# Patient Record
Sex: FEMALE | Race: BLACK OR AFRICAN AMERICAN | Employment: FULL TIME | URBAN - METROPOLITAN AREA
[De-identification: names, ages, dates, MRNs, and addresses within clinical notes are randomized per-mention and may not be internally consistent; named-entity substitution may affect disease eponyms.]

---

## 2017-02-24 ENCOUNTER — OFFICE VISIT (OUTPATIENT)
Dept: FAMILY MEDICINE CLINIC | Age: 32
End: 2017-02-24

## 2017-02-24 VITALS
RESPIRATION RATE: 18 BRPM | WEIGHT: 293 LBS | BODY MASS INDEX: 43.4 KG/M2 | OXYGEN SATURATION: 98 % | HEART RATE: 98 BPM | DIASTOLIC BLOOD PRESSURE: 94 MMHG | HEIGHT: 69 IN | TEMPERATURE: 97.7 F | SYSTOLIC BLOOD PRESSURE: 143 MMHG

## 2017-02-24 DIAGNOSIS — J45.909 UNCOMPLICATED ASTHMA, UNSPECIFIED ASTHMA SEVERITY: ICD-10-CM

## 2017-02-24 DIAGNOSIS — E11.9 DIABETES MELLITUS WITHOUT COMPLICATION (HCC): ICD-10-CM

## 2017-02-24 DIAGNOSIS — Z00.00 PHYSICAL EXAM: Primary | ICD-10-CM

## 2017-02-24 DIAGNOSIS — F31.70 BIPOLAR AFFECTIVE DISORDER IN REMISSION (HCC): ICD-10-CM

## 2017-02-24 DIAGNOSIS — E66.01 MORBID OBESITY, UNSPECIFIED OBESITY TYPE (HCC): ICD-10-CM

## 2017-02-24 RX ORDER — OXCARBAZEPINE 600 MG/1
1200 TABLET, FILM COATED ORAL
COMMUNITY
End: 2019-05-22 | Stop reason: ALTCHOICE

## 2017-02-24 RX ORDER — AZITHROMYCIN 250 MG/1
250 TABLET, FILM COATED ORAL DAILY
COMMUNITY
End: 2019-05-22 | Stop reason: ALTCHOICE

## 2017-02-24 RX ORDER — ALBUTEROL SULFATE 90 UG/1
1-2 AEROSOL, METERED RESPIRATORY (INHALATION)
Qty: 1 INHALER | Refills: 2 | Status: SHIPPED | OUTPATIENT
Start: 2017-02-24 | End: 2019-06-21 | Stop reason: ALTCHOICE

## 2017-02-24 RX ORDER — BENZONATATE 100 MG/1
100 CAPSULE ORAL
COMMUNITY
End: 2017-02-24 | Stop reason: ALTCHOICE

## 2017-02-24 RX ORDER — BUPROPION HYDROCHLORIDE 75 MG/1
TABLET ORAL 2 TIMES DAILY
COMMUNITY
End: 2019-05-22 | Stop reason: ALTCHOICE

## 2017-02-24 NOTE — MR AVS SNAPSHOT
Visit Information Date & Time Provider Department Dept. Phone Encounter #  
 2/24/2017 10:40 AM Keenan Cleary MD Landon Purvis [id number] Follow-up Instructions Return in about 4 months (around 6/24/2017) for Follow-up, DM. Upcoming Health Maintenance Date Due DTaP/Tdap/Td series (1 - Tdap) 5/27/2007 PAP AKA CERVICAL CYTOLOGY 2/28/2016 INFLUENZA AGE 9 TO ADULT 8/1/2016 Allergies as of 2/24/2017  Review Complete On: 2/24/2017 By: Keenan Cleary MD  
  
 Severity Noted Reaction Type Reactions Bactrim [Sulfamethoprim Ds] High 09/25/2013   Systemic Hives Tape [Adhesive]  09/11/2013    Rash Current Immunizations  Reviewed on 9/25/2013 Name Date Influenza Vaccine 9/25/2013 Pneumococcal Polysaccharide (PPSV-23) 1/2/2014  1:28 PM  
  
 Not reviewed this visit You Were Diagnosed With   
  
 Codes Comments Physical exam    -  Primary ICD-10-CM: Z00.00 ICD-9-CM: V70.9 Diabetes mellitus without complication ([facility]     XTJ-82-BH: E11.9 ICD-9-CM: 250.00 Morbid obesity, unspecified obesity type ([facility]     ICD-10-CM: E66.01 
ICD-9-CM: 278.01 Uncomplicated asthma, unspecified asthma severity     ICD-10-CM: J45.909 ICD-9-CM: 493.90 Bipolar affective disorder in remission [facility])     ICD-10-CM: F31.70 ICD-9-CM: 296.80 Vitals BP  
  
  
  
  
  
 (!) 143/94 (BP 1 Location: Left arm, BP Patient Position: Sitting) BMI and BSA Data Body Mass Index Body Surface Area 54.58 kg/m 2 2.86 m 2 Preferred Pharmacy Pharmacy Name Phone [address] [phone] Your Updated Medication List  
  
   
This list is accurate as of: 2/24/17 11:46 AM.  Always use your most recent med list.  
  
  
  
  
 albuterol 90 mcg/actuation inhaler Commonly known as:  PROVENTIL HFA, VENTOLIN HFA, PROAIR HFA Take 1-2 Puffs by inhalation every four (4) hours as needed for Wheezing. azithromycin 250 mg tablet Commonly known as:  Deloris [place] Take 250 mg by mouth daily. buPROPion 75 mg tablet Commonly known as:  [facility] Take  by mouth two (2) times a day. guaiFENesin-dextromethorphan -30 mg per tablet Commonly known as:  [employer] DM Take 1 Tab by mouth two (2) times a day. OXcarbaxepine 600 mg tablet Commonly known as:  TRILEPTAL Take 1,200 mg by mouth. Prescriptions Sent to Pharmacy Refills  
 albuterol (PROVENTIL HFA, VENTOLIN HFA, PROAIR HFA) 90 mcg/actuation inhaler 2 Sig: Take 1-2 Puffs by inhalation every four (4) hours as needed for Wheezing. Class: Normal  
 Pharmacy: [facility] [address] [place] [address] [place] Ph #: [phone] Route: Inhalation  
 guaiFENesin-dextromethorphan SR (MUCINEX DM) 600-30 mg per tablet 0 Sig: Take 1 Tab by mouth two (2) times a day. Class: Normal  
 Pharmacy: [facility] [address] [place] [address] [place] Ph #: [phone] Route: Oral  
  
We Performed the Following CBC WITH AUTOMATED DIFF [56519 CPT(R)] HEMOGLOBIN A1C WITH EAG [79202 CPT(R)] LIPID PANEL [67837 CPT(R)] METABOLIC PANEL, COMPREHENSIVE [81183 CPT(R)] MICROALBUMIN, UR, RAND W/ MICROALBUMIN/CREA RATIO [id number] CPT(R)] T4, FREE [id number] CPT(R)] TSH 3RD GENERATION [02461 CPT(R)] URINALYSIS W/ RFLX MICROSCOPIC [47703 CPT(R)] VITAMIN D, 25 HYDROXY [id number] CPT(R)] Follow-up Instructions Return in about 4 months (around 6/24/2017) for Follow-up, DM. Patient Instructions A Healthy Lifestyle: Care Instructions Your Care Instructions A healthy lifestyle can help you feel good, stay at a healthy weight, and have plenty of energy for both work and play.  A healthy lifestyle is something you can share with your whole family. A healthy lifestyle also can lower your risk for serious health problems, such as high blood pressure, heart disease, and diabetes. You can follow a few steps listed below to improve your health and the health of your family. Follow-up care is a key part of your treatment and safety. Be sure to make and go to all appointments, and call your doctor if you are having problems. Its also a good idea to know your test results and keep a list of the medicines you take. How can you care for yourself at home? · Do not eat too much sugar, fat, or fast foods. You can still have dessert and treats now and then. The goal is moderation. · Start small to improve your eating habits. Pay attention to portion sizes, drink less juice and soda pop, and eat more fruits and vegetables. ¨ Eat a healthy amount of food. A 3-ounce serving of meat, for example, is about the size of a deck of cards. Fill the rest of your plate with vegetables and whole grains. ¨ Limit the amount of soda and sports drinks you have every day. Drink more water when you are thirsty. ¨ Eat at least 5 servings of fruits and vegetables every day. It may seem like a lot, but it is not hard to reach this goal. A serving or helping is 1 piece of fruit, 1 cup of vegetables, or 2 cups of leafy, raw vegetables. Have an apple or some carrot sticks as an afternoon snack instead of a candy bar. Try to have fruits and/or vegetables at every meal. 
· Make exercise part of your daily routine. You may want to start with simple activities, such as walking, bicycling, or slow swimming. Try to be active 30 to 60 minutes every day. You do not need to do all 30 to 60 minutes all at once. For example, you can exercise 3 times a day for 10 or 20 minutes. Moderate exercise is safe for most people, but it is always a good idea to talk to your doctor before starting an exercise program. 
· Keep moving.  Vipul Haus the lawn, work in the garden, or clean your house. Take the stairs instead of the elevator at work. · If you smoke, quit. People who smoke have an increased risk for heart attack, stroke, cancer, and other lung illnesses. Quitting is hard, but there are ways to boost your chance of quitting tobacco for good. ¨ Use nicotine gum, patches, or lozenges. ¨ Ask your doctor about stop-smoking programs and medicines. ¨ Keep trying. In addition to reducing your risk of diseases in the future, you will notice some benefits soon after you stop using tobacco. If you have shortness of breath or asthma symptoms, they will likely get better within a few weeks after you quit. · Limit how much alcohol you drink. Moderate amounts of alcohol (up to 2 drinks a day for men, 1 drink a day for women) are okay. But drinking too much can lead to liver problems, high blood pressure, and other health problems. Family health If you have a family, there are many things you can do together to improve your health. · Eat meals together as a family as often as possible. · Eat healthy foods. This includes fruits, vegetables, lean meats and dairy, and whole grains. · Include your family in your fitness plan. Most people think of activities such as jogging or tennis as the way to fitness, but there are many ways you and your family can be more active. Anything that makes you breathe hard and gets your heart pumping is exercise. Here are some tips: 
¨ Walk to do errands or to take your child to school or the bus. ¨ Go for a family bike ride after dinner instead of watching TV. Where can you learn more? Go to http://seth-vidal.info/. Enter P380 in the search box to learn more about \"A Healthy Lifestyle: Care Instructions. \" Current as of: July 26, 2016 Content Version: 11.1 © 4181-4709 [employer]. Care instructions adapted under license by [employer] (which disclaims liability or warranty for this information).  If you have questions about a medical condition or this instruction, always ask your healthcare professional. [address] any warranty or liability for your use of this information. Introducing [facility]! Rick Dempsey introduces Zhihu patient portal. Now you can access parts of your medical record, email your doctor's office, and request medication refills online. 1. In your internet browser, go to [email or web address] 2. Click on the First Time User? Click Here link in the Sign In box. You will see the New Member Sign Up page. 3. Enter your Zhihu Access Code exactly as it appears below. You will not need to use this code after youve completed the sign-up process. If you do not sign up before the expiration date, you must request a new code. · Zhihu Access Code: [email or web address] Expires: 5/25/2017 10:05 AM 
 
4. Enter the last four digits of your Social Security Number (xxxx) and Date of Birth (mm/dd/yyyy) as indicated and click Submit. You will be taken to the next sign-up page. 5. Create a Zhihu ID. This will be your Zhihu login ID and cannot be changed, so think of one that is secure and easy to remember. 6. Create a Zhihu password. You can change your password at any time. 7. Enter your Password Reset Question and Answer. This can be used at a later time if you forget your password. 8. Enter your e-mail address. You will receive e-mail notification when new information is available in [address]. 9. Click Sign Up. You can now view and download portions of your medical record. 10. Click the Download Summary menu link to download a portable copy of your medical information. If you have questions, please visit the Frequently Asked Questions section of the Zhihu website. Remember, Zhihu is NOT to be used for urgent needs. For medical emergencies, dial 911. Now available from your iPhone and Android!  
 
  
  
 Please provide this summary of care documentation to your next provider. Your primary care clinician is listed as Chase Daniel. If you have any questions after today's visit, please call [phone].

## 2017-02-24 NOTE — PATIENT INSTRUCTIONS

## 2017-02-24 NOTE — LETTER
3/20/2017 4:53 PM 
 
Ms. Christine López 
[address] [facility] [address] [id number] Dear Christine López: 
 
Please find your most recent results below. Resulted Orders CBC WITH AUTOMATED DIFF Result Value Ref Range WBC 6.9 3.4 - 10.8 x10E3/uL  
 RBC 5.20 3.77 - 5.28 x10E6/uL HGB 11.5 11.1 - 15.9 g/dL HCT 36.8 34.0 - 46.6 % MCV 71 (L) 79 - 97 fL  
 MCH 22.1 (L) 26.6 - 33.0 pg  
 MCHC 31.3 (L) 31.5 - 35.7 g/dL  
 RDW 15.9 (H) 12.3 - 15.4 % PLATELET 024 324 - 547 x10E3/uL NEUTROPHILS 54 % Lymphocytes 39 % MONOCYTES 4 % EOSINOPHILS 3 % BASOPHILS 0 %  
 ABS. NEUTROPHILS 3.7 1.4 - 7.0 x10E3/uL Abs Lymphocytes 2.7 0.7 - 3.1 x10E3/uL  
 ABS. MONOCYTES 0.3 0.1 - 0.9 x10E3/uL  
 ABS. EOSINOPHILS 0.2 0.0 - 0.4 x10E3/uL  
 ABS. BASOPHILS 0.0 0.0 - 0.2 x10E3/uL IMMATURE GRANULOCYTES 0 %  
 ABS. IMM. GRANS. 0.0 0.0 - 0.1 x10E3/uL Narrative Performed at:  [address]  [id number] : Nikki Stroud MD, Phone:  [phone] METABOLIC PANEL, COMPREHENSIVE Result Value Ref Range Glucose 93 65 - 99 mg/dL BUN 9 6 - 20 mg/dL Creatinine 0.69 0.57 - 1.00 mg/dL GFR est non- >59 mL/min/1.73 GFR est  >59 mL/min/1.73  
 BUN/Creatinine ratio 13 8 - 20 Sodium 135 134 - 144 mmol/L Potassium 4.2 3.5 - 5.2 mmol/L Chloride 97 96 - 106 mmol/L  
 CO2 26 18 - 29 mmol/L Calcium 9.1 8.7 - 10.2 mg/dL Protein, total 7.5 6.0 - 8.5 g/dL Albumin 4.2 3.5 - 5.5 g/dL GLOBULIN, TOTAL 3.3 1.5 - 4.5 g/dL A-G Ratio 1.3 1.1 - 2.5 Comment: **Effective March 13, 2017 the reference interval** 
  for A/G Ratio will be changing to: Age                Male          Female           0 -  7 days       1.1 - 2.3       1.1 - 2.3 
          8 - 30 days       1.2 - 2.8       1.2 - 2.8 
          1 -  6 months     1.3 - 3.6       1.3 - 3.6 
   7 months -  5 years      1.5 - 2.6       1.5 - 2.6 
             > 5 years      1.2 - 2.2       1.2 - 2.2 Bilirubin, total <0.2 0.0 - 1.2 mg/dL Alk. phosphatase 62 39 - 117 IU/L  
 AST (SGOT) 14 0 - 40 IU/L  
 ALT (SGPT) 13 0 - 32 IU/L Narrative Performed at:  [address]  [id number] : Obdulia Templeton MD, Phone:  [phone] LIPID PANEL Result Value Ref Range Cholesterol, total 161 100 - 199 mg/dL Triglyceride 64 0 - 149 mg/dL HDL Cholesterol 40 >39 mg/dL VLDL, calculated 13 5 - 40 mg/dL LDL, calculated 108 (H) 0 - 99 mg/dL Narrative Performed at:  [address]  [id number] : Obdulia Templeton MD, Phone:  [phone] HEMOGLOBIN A1C WITH EAG Result Value Ref Range Hemoglobin A1c 6.1 (H) 4.8 - 5.6 % Comment:  
            Pre-diabetes: 5.7 - 6.4 Diabetes: >6.4 Glycemic control for adults with diabetes: <7.0 Estimated average glucose 128 mg/dL Narrative Performed at:  [address]  [id number] : Obdulia Templeton MD, Phone:  [phone] TSH 3RD GENERATION Result Value Ref Range TSH 1.070 0.450 - 4.500 uIU/mL Narrative Performed at:  [address]  [id number] : Obdulia Templeton MD, Phone:  [phone] T4, FREE Result Value Ref Range T4, Free 1.32 0.82 - 1.77 ng/dL Narrative Performed at:  [address]  [id number] : Obdulia Templeton MD, Phone:  [phone] VITAMIN D, 25 HYDROXY Result Value Ref Range VITAMIN D, 25-HYDROXY 15.5 (L) 30.0 - 100.0 ng/mL Comment:  
   Vitamin D deficiency has been defined by the [facility] practice guideline as a 
level of serum 25-OH vitamin D less than 20 ng/mL (1,2).  
The Endocrine Society went on to further define vitamin D 
insufficiency as a level between 21 and 29 ng/mL (2). 1. IOM ([place] of Medicine). 2010. Dietary reference 
   intakes for calcium and D. [facility] 
   Astley Clarke. 2. Dana MF, Cesario PARKS, Saad ROLDAN, et al. 
   Evaluation, treatment, and prevention of vitamin D 
   deficiency: an Endocrine Society clinical practice 
   guideline. JCEM. 2011 Jul; 96(7):1911-30. Narrative Performed at:  [address]  [id number] : Santos Simeon MD, Phone:  [phone] URINALYSIS W/ RFLX MICROSCOPIC Result Value Ref Range Specific Gravity 1.026 1.005 - 1.030  
 pH (UA) 6.5 5.0 - 7.5 Color Yellow Yellow Appearance Cloudy (A) Clear Leukocyte Esterase 1+ (A) Negative Protein 1+ (A) Negative/Trace Glucose Negative Negative Ketone Negative Negative Blood 1+ (A) Negative Bilirubin Negative Negative Urobilinogen 0.2 0.2 - 1.0 mg/dL Nitrites Negative Negative Microscopic Examination See additional order Comment:  
   Microscopic was indicated and was performed. Narrative Performed at:  [address]  [id number] : Santos Simeon MD, Phone:  [phone] MICROALBUMIN, UR, RAND W/ MICROALBUMIN/CREA RATIO Result Value Ref Range Creatinine, urine 199.6 Not Estab. mg/dL Microalbumin, urine 100.8 Not Estab. ug/mL Microalb/Creat ratio (ug/mg creat.) 50.5 (H) 0.0 - 30.0 mg/g creat Narrative Performed at:  [address]  [id number] : Santos Simeon MD, Phone:  [phone] MICROSCOPIC EXAMINATION Result Value Ref Range WBC 6-10 (A) 0 - 5 /hpf  
 RBC 0-2 0 - 2 /hpf Epithelial cells 0-10 0 - 10 /hpf Casts None seen None seen /lpf Mucus Present Not Estab. Bacteria Few None seen/Few Narrative  Performed at:  [place] [address] [address]  [id number] : Stefano Crowder MD, Phone:  [phone] CVD REPORT Result Value Ref Range INTERPRETATION Note Comment:  
   Supplement report is available. Narrative Performed at:  [address] [place] [address]  [id number] : Chi Cosme PhD, Phone:  [phone] DIABETES PATIENT EDUCATION Result Value Ref Range PDF Image Not applicable Narrative Performed at:  [address] [place] [address]  [id number] : Chi Cosme PhD, Phone:  [phone] RECOMMENDATIONS: 
 
Complete Blood Count: Hemoglobin improved to 11.5 Comprehensive Metabolic Panel:  Normal  
Hemoglobin A1C: 6.1 Diabetes control good . Microalb/Creat ratio: Elevated Thyroid Tests: Normal  
Cholesterol: marginally Elevated LDL ( Bad cholesterol ) Urine analysis: Increased WBCs Vitamin D Level: Low Advise: Stick to strict diabetic diet, exercise regularly and if on medications be compliant with your medications. *Take Over the Counter Vit D3 2000 units daily* Schedule follow-up with new PCP Please call me if you have any questions: [phone] Sincerely, Keenan Cleary MD

## 2017-02-24 NOTE — PROGRESS NOTES
HISTORY OF PRESENT ILLNESS  Maribel Kent is a 28 y.o. female. Complete Physical   The history is provided by the patient (Here for physical . Recovering from Bronchitis . She is on last pill of Zithromax. She is known to have diabetes ,morbid obesity, asthma and bipolar disorder  . ). Review of Systems   Constitutional: Negative. HENT: Negative. Eyes: Negative. Cardiovascular: Negative. Gastrointestinal: Negative. Genitourinary: Negative. Musculoskeletal: Negative. Skin: Negative. Neurological: Negative. Endo/Heme/Allergies: Negative. Psychiatric/Behavioral: Negative. Physical Exam  General:     Head: Alert, cooperative, no distress, appears stated age. Morbid obesity. Normocephalic and atraumatic   Eyes:  Conjunctivae/corneas clear. PERRL, EOMs intact. Ears:  Normal TMs and external ear canals both ears. Nose: Nares normal. Septum midline. Mucosa normal. No drainage or sinus tenderness. Mouth:  Throat: Lips, mucosa, and tongue normal. Teeth and gums normal.  No lesions or exudates. Neck: Supple, symmetrical, trachea midline, no adenopathy, thyroid: no enlargement/tenderness/nodules. Back:   Symmetric, no curvature. ROM normal. No CVA tenderness. Lungs:   Clear to auscultation bilaterally. Heart:  Regular rate and rhythm, S1, S2 normal, no murmur, click, rub or gallop. Abdomen:   Soft, non-tender. Bowel sounds normal. No masses,  No organomegaly. Extremities: Extremities normal, atraumatic, no cyanosis or edema. Pulses: 2+ and symmetric all extremities. Skin: Skin color, texture, turgor normal. No rashes or lesions   Lymph nodes: Cervical & supraclavicular nodes normal.   Neurologic: CNII-XII intact. Normal strength, sensation and reflexes throughout. Psych:                      Normal mood and affect     ASSESSMENT and PLAN  Encounter Diagnoses   Name Primary?     Physical exam Yes    Diabetes mellitus without complication ([address]     Morbid obesity, unspecified obesity type ([facility]     Uncomplicated asthma, unspecified asthma severity     Bipolar affective disorder in remission ([facility]      Orders Placed This Encounter    CBC WITH AUTOMATED DIFF    METABOLIC PANEL, COMPREHENSIVE    LIPID PANEL    HEMOGLOBIN A1C WITH EAG    TSH 3RD GENERATION    T4, FREE    VITAMIN D, 25 HYDROXY    URINALYSIS W/ RFLX MICROSCOPIC    MICROALBUMIN, UR, RAND W/ MICROALBUMIN/CREA RATIO    DISCONTD: benzonatate (TESSALON) 100 mg capsule    azithromycin (ZITHROMAX) 250 mg tablet    OXcarbaxepine (TRILEPTAL) 600 mg tablet    buPROPion (WELLBUTRIN) 75 mg tablet    albuterol (PROVENTIL HFA, VENTOLIN HFA, PROAIR HFA) 90 mcg/actuation inhaler    guaiFENesin-dextromethorphan SR (MUCINEX DM) 600-30 mg per tablet   Follow-up Disposition:  Return in about 4 months (around 6/24/2017) for Follow-up, DM. Reviewed and discussed previous lab results. Results of labs requested today will be notified when available. She was advised to continue with current medications. Discussed lifestyle issues and health guidance given. She was given an after visit summary which includes diagnoses, vital signs, current medications, &  authorized prescriptions. Patient verbalized understanding.

## 2017-03-08 LAB
25(OH)D3+25(OH)D2 SERPL-MCNC: 15.5 NG/ML (ref 30–100)
ALBUMIN SERPL-MCNC: 4.2 G/DL (ref 3.5–5.5)
ALBUMIN/CREAT UR: 50.5 MG/G CREAT (ref 0–30)
ALBUMIN/GLOB SERPL: 1.3 {RATIO} (ref 1.1–2.5)
ALP SERPL-CCNC: 62 IU/L (ref 39–117)
ALT SERPL-CCNC: 13 IU/L (ref 0–32)
APPEARANCE UR: ABNORMAL
AST SERPL-CCNC: 14 IU/L (ref 0–40)
BACTERIA #/AREA URNS HPF: ABNORMAL /[HPF]
BASOPHILS # BLD AUTO: 0 X10E3/UL (ref 0–0.2)
BASOPHILS NFR BLD AUTO: 0 %
BILIRUB SERPL-MCNC: <0.2 MG/DL (ref 0–1.2)
BILIRUB UR QL STRIP: NEGATIVE
BUN SERPL-MCNC: 9 MG/DL (ref 6–20)
BUN/CREAT SERPL: 13 (ref 8–20)
CALCIUM SERPL-MCNC: 9.1 MG/DL (ref 8.7–10.2)
CASTS URNS QL MICRO: ABNORMAL /LPF
CHLORIDE SERPL-SCNC: 97 MMOL/L (ref 96–106)
CHOLEST SERPL-MCNC: 161 MG/DL (ref 100–199)
CO2 SERPL-SCNC: 26 MMOL/L (ref 18–29)
COLOR UR: YELLOW
CREAT SERPL-MCNC: 0.69 MG/DL (ref 0.57–1)
CREAT UR-MCNC: 199.6 MG/DL
EOSINOPHIL # BLD AUTO: 0.2 X10E3/UL (ref 0–0.4)
EOSINOPHIL NFR BLD AUTO: 3 %
EPI CELLS #/AREA URNS HPF: ABNORMAL /HPF
ERYTHROCYTE [DISTWIDTH] IN BLOOD BY AUTOMATED COUNT: 15.9 % (ref 12.3–15.4)
EST. AVERAGE GLUCOSE BLD GHB EST-MCNC: 128 MG/DL
GLOBULIN SER CALC-MCNC: 3.3 G/DL (ref 1.5–4.5)
GLUCOSE SERPL-MCNC: 93 MG/DL (ref 65–99)
GLUCOSE UR QL: NEGATIVE
HBA1C MFR BLD: 6.1 % (ref 4.8–5.6)
HCT VFR BLD AUTO: 36.8 % (ref 34–46.6)
HDLC SERPL-MCNC: 40 MG/DL
HGB BLD-MCNC: 11.5 G/DL (ref 11.1–15.9)
HGB UR QL STRIP: ABNORMAL
IMM GRANULOCYTES # BLD: 0 X10E3/UL (ref 0–0.1)
IMM GRANULOCYTES NFR BLD: 0 %
INTERPRETATION, 910389: NORMAL
KETONES UR QL STRIP: NEGATIVE
LDLC SERPL CALC-MCNC: 108 MG/DL (ref 0–99)
LEUKOCYTE ESTERASE UR QL STRIP: ABNORMAL
LYMPHOCYTES # BLD AUTO: 2.7 X10E3/UL (ref 0.7–3.1)
LYMPHOCYTES NFR BLD AUTO: 39 %
Lab: NORMAL
MCH RBC QN AUTO: 22.1 PG (ref 26.6–33)
MCHC RBC AUTO-ENTMCNC: 31.3 G/DL (ref 31.5–35.7)
MCV RBC AUTO: 71 FL (ref 79–97)
MICRO URNS: ABNORMAL
MICROALBUMIN UR-MCNC: 100.8 UG/ML
MONOCYTES # BLD AUTO: 0.3 X10E3/UL (ref 0.1–0.9)
MONOCYTES NFR BLD AUTO: 4 %
MUCOUS THREADS URNS QL MICRO: PRESENT
NEUTROPHILS # BLD AUTO: 3.7 X10E3/UL (ref 1.4–7)
NEUTROPHILS NFR BLD AUTO: 54 %
NITRITE UR QL STRIP: NEGATIVE
PH UR STRIP: 6.5 [PH] (ref 5–7.5)
PLATELET # BLD AUTO: 351 X10E3/UL (ref 150–379)
POTASSIUM SERPL-SCNC: 4.2 MMOL/L (ref 3.5–5.2)
PROT SERPL-MCNC: 7.5 G/DL (ref 6–8.5)
PROT UR QL STRIP: ABNORMAL
RBC # BLD AUTO: 5.2 X10E6/UL (ref 3.77–5.28)
RBC #/AREA URNS HPF: ABNORMAL /HPF
SODIUM SERPL-SCNC: 135 MMOL/L (ref 134–144)
SP GR UR: 1.03 (ref 1–1.03)
T4 FREE SERPL-MCNC: 1.32 NG/DL (ref 0.82–1.77)
TRIGL SERPL-MCNC: 64 MG/DL (ref 0–149)
TSH SERPL DL<=0.005 MIU/L-ACNC: 1.07 UIU/ML (ref 0.45–4.5)
UROBILINOGEN UR STRIP-MCNC: 0.2 MG/DL (ref 0.2–1)
VLDLC SERPL CALC-MCNC: 13 MG/DL (ref 5–40)
WBC # BLD AUTO: 6.9 X10E3/UL (ref 3.4–10.8)
WBC #/AREA URNS HPF: ABNORMAL /HPF

## 2017-03-20 NOTE — PROGRESS NOTES
Complete Blood Count : Hemoglobin improved to 11.5  Comprehensive Metabolic Panel :  Normal  Hemoglobin A1C :6.1 Diabetes control good . Microalb/Creat ratio: Elevated   Thyroid Tests:  Normal  Cholesterol :marginally Elevated LDL ( Bad cholesterol )  Urine analysis : Increased WBCs  Vitamin  D Level: Low  Adv:   Stick to strict diabetic diet , exercise regularly and if on medications be compliant with your medications. Keep your follow-up appointment.   Take Over the Counter Vit D3 2000 units daily

## 2017-03-20 NOTE — PROGRESS NOTES
Called and spoke to pt using two pt identifiers, informed of results and recommendations per Dr. Lewis Nina. Advised to start OTC Vit D, pt verbalized understanding. Pt voiced concern regarding WBC in urine, states she continues to have bladder leakage and discharge. Nurse clarified w/ pt that this is vaginal discharge and she has had bladder leakage for approx 2 years \"since hysterectomy\". Pt informed she will most likely need to F/U w/ GYN but information would be passed on to Dr. Lewis Nina for further recommendation.

## 2017-03-21 NOTE — PROGRESS NOTES
Called and spoke to pt, informed needs to F/U with GYN. No further recommendation at this time, per Dr. Karishma Reina.

## 2019-01-17 ENCOUNTER — ED HISTORICAL/CONVERTED ENCOUNTER (OUTPATIENT)
Dept: OTHER | Age: 34
End: 2019-01-17

## 2019-05-22 ENCOUNTER — OFFICE VISIT (OUTPATIENT)
Dept: PRIMARY CARE CLINIC | Age: 34
End: 2019-05-22

## 2019-05-22 VITALS
HEART RATE: 86 BPM | DIASTOLIC BLOOD PRESSURE: 100 MMHG | OXYGEN SATURATION: 97 % | HEIGHT: 69 IN | RESPIRATION RATE: 18 BRPM | WEIGHT: 293 LBS | TEMPERATURE: 98.4 F | BODY MASS INDEX: 43.4 KG/M2 | SYSTOLIC BLOOD PRESSURE: 142 MMHG

## 2019-05-22 DIAGNOSIS — R73.02 IGT (IMPAIRED GLUCOSE TOLERANCE): ICD-10-CM

## 2019-05-22 DIAGNOSIS — E66.01 MORBIDLY OBESE (HCC): ICD-10-CM

## 2019-05-22 DIAGNOSIS — F31.75 BIPOLAR DISORDER, IN PARTIAL REMISSION, MOST RECENT EPISODE DEPRESSED (HCC): ICD-10-CM

## 2019-05-22 DIAGNOSIS — R20.2 PARESTHESIA: ICD-10-CM

## 2019-05-22 DIAGNOSIS — E28.2 PCOD (POLYCYSTIC OVARIAN DISEASE): ICD-10-CM

## 2019-05-22 DIAGNOSIS — Z90.710 H/O TOTAL VAGINAL HYSTERECTOMY: ICD-10-CM

## 2019-05-22 DIAGNOSIS — E55.9 VITAMIN D DEFICIENCY: ICD-10-CM

## 2019-05-22 DIAGNOSIS — I10 ESSENTIAL HYPERTENSION: Primary | ICD-10-CM

## 2019-05-22 RX ORDER — SPIRONOLACTONE 25 MG/1
25 TABLET ORAL DAILY
Qty: 30 TAB | Refills: 0 | Status: SHIPPED | OUTPATIENT
Start: 2019-05-22 | End: 2019-05-28 | Stop reason: SDUPTHER

## 2019-05-22 RX ORDER — METFORMIN HYDROCHLORIDE 1000 MG/1
2000 TABLET ORAL DAILY
COMMUNITY
End: 2019-12-16 | Stop reason: ALTCHOICE

## 2019-05-22 NOTE — PROGRESS NOTES
Written by Ajit Estrada, as dictated by Dr. Ry Witt MD.    Julia Juárez is a 29 y.o. female. HPI  The patient comes in today to establish care. She is fasting for labs today. Patient reports it has been a while since she last saw a physician. Records in [facility] were reviewed. BP is high today at 144/110, 142/100 on repeat. Her BP has been high for a while, but pt notes that it started during her first pregnancy about 14 years ago. Patient was prescribed a fluid pill which caused increased urination but did not improve her BP. She has also tried lisinopril, which did not control her BP. Pt has not tried spironolactone. Occasionally she has palpitations. Denies chest pains. She is taking metformin 1,000 mg with dinner for PCOD. Followed by Dr. Deyvi Blank (OB/GYN). The pt notes that she gained 100 lbs in 1 year, noting that her weight gain started after she was started on psychiatric medications. Patient weighs 399 lbs today and has gained weight from 369 lbs on 02/24/2017. She has been seeing a nutritionist per OB/GYN and was but on a 1500 calorie diet. Next appointment with Dr. Deyvi Blank is in a few weeks and she was instructed to lose 15 lbs by then, however she has not lost weight. She does not feel this diet is right for her and she would like to try a low or no carbohydrate diet. She has not tried Weight Watchers. Patient reports that is is active at work and gets around 8,000 steps daily. Pt has been seeing a psychiatrist for depression and bipolar disorder. She was taken off of Wellbutrin as she became manic. The pt was then started on Marden Meli, but she had to come off of all medications. Pt notes that she overdosed in the past for attempted suicide. Denies suicidal ideation. She has an appointment with her psychiatrist later today. She has tingling in both hands.  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 The patient notes that she gets headaches. Patient has not tried Topamax. S/p partial hysterectomy, which was performed as she had bleeding for 6 months. Patient Active Problem List   Diagnosis Code    Posttraumatic stress disorder F43.10    Somatization disorder F45.0    Attention deficit disorder with hyperactivity(314.01) F90.9    Unspecified asthma(493.90) J45.909    Drug overdose, intentional ([facility] T50.902A    Obesity, morbid, BMI 50 or higher ([facility] E66.01    Bipolar disorder, in partial remission, most recent episode depressed ([facility] F31.75        Current Outpatient Medications on File Prior to Visit   Medication Sig Dispense Refill    metFORMIN (GLUCOPHAGE) 1,000 mg tablet Take 2,000 mg by mouth daily. Indications: disease of ovaries with cysts      albuterol (PROVENTIL HFA, VENTOLIN HFA, PROAIR HFA) 90 mcg/actuation inhaler Take 1-2 Puffs by inhalation every four (4) hours as needed for Wheezing. 1 Inhaler 2    guaiFENesin-dextromethorphan SR (MUCINEX DM) 600-30 mg per tablet Take 1 Tab by mouth two (2) times a day. 20 Tab 0     No current facility-administered medications on file prior to visit.         Allergies   Allergen Reactions    Bactrim [Sulfamethoprim Ds] Hives    Tape [Adhesive] Rash       Past Medical History:   Diagnosis Date    Anemia     Asthma     10 yrs old    Attention deficit disorder with hyperactivity(314.01) 10/31/2013    Bipolar I disorder, most recent episode (or current) manic, unspecified 10/31/2013    [facility]    Depression 9/25/13    bipolar disorder    Diabetes ([facility]     Essential hypertension     with first pregnacy    Mood disorder ([facility] 9/25/2013    Morbid obesity ([facility]     PCOS (polycystic ovarian syndrome)     A1c 6.6 4/2015    Personality disorder ([facility]      histrionic              Johnita Lied    Posttraumatic stress disorder 10/31/2013    Prediabetes 12/31/13    A1c 6.3 per lab   6.6 4/2015    Somatization disorder 10/31/2013    Unspecified personality disorder 10/31/2013    Unspecified sleep apnea     childhood --had T&A done       Past Surgical History:   Procedure Laterality Date    HX GYN  2013    BTL    HX GYN      D & C    HX HYSTERECTOMY  2/25/15    Spasic at  [facility] TONSILLECTOMY  2007       Family History   Problem Relation Age of Onset    Diabetes Maternal Grandmother     Lung Disease Maternal Grandfather     Cancer Paternal Grandmother         Pancreatic    Diabetes Father     Anesth Problems Neg Hx        Social History     Socioeconomic History    Marital status:      Spouse name: Not on file    Number of children: Not on file    Years of education: Not on file    Highest education level: Not on file   Occupational History    Not on file   Social Needs    Financial resource strain: Not on file    Food insecurity:     Worry: Not on file     Inability: Not on file    Transportation needs:     Medical: Not on file     Non-medical: Not on file   Tobacco Use    Smoking status: Never Smoker    Smokeless tobacco: Never Used   Substance and Sexual Activity    Alcohol use: No    Drug use: No    Sexual activity: Yes     Partners: Male     Birth control/protection: Condom, None   Lifestyle    Physical activity:     Days per week: Not on file     Minutes per session: Not on file    Stress: Not on file   Relationships    Social connections:     Talks on phone: Not on file     Gets together: Not on file     Attends Confucianism service: Not on file     Active member of club or organization: Not on file     Attends meetings of clubs or organizations: Not on file     Relationship status: Not on file    Intimate partner violence:     Fear of current or ex partner: Not on file     Emotionally abused: Not on file     Physically abused: Not on file     Forced sexual activity: Not on file   Other Topics Concern    Not on file   Social History Narrative    Not on file Review of Systems   Constitutional: Negative for malaise/fatigue. HENT: Negative for congestion. Eyes: Negative for blurred vision and pain. Respiratory: Negative for cough and shortness of breath. Cardiovascular: Positive for palpitations (occasional). Negative for chest pain. Gastrointestinal: Negative for abdominal pain and heartburn. Genitourinary: Negative for frequency and urgency. Musculoskeletal: Negative for joint pain and myalgias. Neurological: Positive for tingling (BL hands), weakness (BL hands) and headaches. Negative for dizziness and sensory change. Psychiatric/Behavioral: Positive for depression. Negative for memory loss and substance abuse. Visit Vitals  BP (!) 142/100 (BP 1 Location: Left arm, BP Patient Position: Sitting)   Pulse 86   Temp 98.4 °F (36.9 °C) (Oral)   Resp 18   Ht 5' 9\" (1.753 m)   Wt (!) 399 lb (181 kg)   LMP 02/18/2015 Comment: bleeding consistently for 7 months now since 7/2014   SpO2 97%   BMI 58.92 kg/m²       Physical Exam   Constitutional: She is oriented to person, place, and time. She appears well-developed. No distress. Morbidly obese   HENT:   Right Ear: External ear normal.   Left Ear: External ear normal.   Eyes: Conjunctivae and EOM are normal. Right eye exhibits no discharge. Left eye exhibits no discharge. Neck: Normal range of motion. Neck supple. Cardiovascular: Normal rate, regular rhythm and normal heart sounds. Pulmonary/Chest: Effort normal and breath sounds normal. She has no wheezes. Abdominal: Soft. Bowel sounds are normal. There is no tenderness. Lymphadenopathy:     She has no cervical adenopathy. Neurological: She is alert and oriented to person, place, and time. Skin: She is not diaphoretic. Psychiatric: She has a normal mood and affect. Her behavior is normal.   Nursing note and vitals reviewed. ASSESSMENT and PLAN    ICD-10-CM ICD-9-CM    1.  Essential hypertension X63 571.1 METABOLIC PANEL, COMPREHENSIVE      CBC W/O DIFF      LIPID PANEL      TSH 3RD GENERATION      spironolactone (ALDACTONE) 25 mg tablet sent to pharmacy. CMP, CBC, lipid panel, and TSH ordered. Spironolactone 25 mg prescribed. Potential side effects were discussed. She should monitor her BP at work and bring her BP log to her follow-up in 3-4 weeks. 2. Bipolar disorder, in partial remission, most recent episode depressed ([facility] F31.75 296.55 She is not on any medications at this time and has an appointment with psychiatry later today. Advised patient to discuss starting Topamax with her psychiatrist as it helps with mood disorder & weight loss. 3. Morbidly obese (HCC) E66.01 278.01 I recommend the patient download the Weight Watchers lucia to help track food consumption. The patient should not use their weekly points, as weekly points are for weight maintenance and the patient is trying to lose weight. The patient should eat plenty of fruits and vegetables. Discussed that a healthy lifestyle requires 5,000-7,000 steps daily, but weight loss requires 10,000 steps daily. Followed by nutritionist and OB/GYN for weight loss. 4. PCOD (polycystic ovarian disease) E28.2 256.4 Compliant on metformin 1,000 mg once daily and followed by OB/GYN. Discussed that spironolactone should also treat her PCOD. 5. H/O total vaginal hysterectomy Z90.710 V88.01 Followed by OB/GYN. 6. IGT (impaired glucose tolerance) R73.02 790.22 HEMOGLOBIN A1C WITH EAG    HbA1c ordered. 7. Vitamin D deficiency E55.9 268.9 VITAMIN D, 25 HYDROXY    Vitamin D ordered. 8. Paresthesia R20.2 782.0 Discussed that this may be due to fluid retention or carpel tunnel. Recommended using wrist splints at night. This plan was reviewed with the patient and patient agrees. All questions were answered. This scribe documentation was reviewed by me and accurately reflects the examination and decisions made by me. This note will not be viewable in [address]

## 2019-05-22 NOTE — PROGRESS NOTES
Visit Vitals  BP (!) 144/110 (BP 1 Location: Left arm, BP Patient Position: Sitting)   Pulse 86   Temp 98.4 °F (36.9 °C) (Oral)   Resp 18   Ht 5' 9\" (1.753 m)   Wt (!) 399 lb (181 kg)   SpO2 97%   BMI 58.92 kg/m²           Chief Complaint   Patient presents with   [address]     Patient needs a PCP    Labs     Patient is fasting in anticipation of labs     Tingling     Numbness bilateral l hands, worse in Left / Bilateral Thighs, worse in R thighs         ====Raven Preston Invitation====    Patient was invited to [facility] on this date and given the information folder for review. Recommended appointment with Raven Preston facilitator for ACP conversation regarding advance directives. [x] Yes  [] No  Referral sent to [facility] team member or Coordinator for follow-up    [] Yes  [x] No  Patient scheduled an appointment. Site of Referral[facility]            1. Have you been to the ER, urgent care clinic since your last visit? Hospitalized since your last visit? Denies     2. Have you seen or consulted any other health care providers outside of the [address] [place] [address] since your last visit? Include any pap smears or colon screening.  OB/GYN

## 2019-05-23 LAB
25(OH)D3+25(OH)D2 SERPL-MCNC: 17.1 NG/ML (ref 30–100)
ALBUMIN SERPL-MCNC: 4.1 G/DL (ref 3.5–5.5)
ALBUMIN/GLOB SERPL: 1.5 {RATIO} (ref 1.2–2.2)
ALP SERPL-CCNC: 62 IU/L (ref 39–117)
ALT SERPL-CCNC: 10 IU/L (ref 0–32)
AST SERPL-CCNC: 15 IU/L (ref 0–40)
BILIRUB SERPL-MCNC: <0.2 MG/DL (ref 0–1.2)
BUN SERPL-MCNC: 11 MG/DL (ref 6–20)
BUN/CREAT SERPL: 17 (ref 9–23)
CALCIUM SERPL-MCNC: 9.3 MG/DL (ref 8.7–10.2)
CHLORIDE SERPL-SCNC: 102 MMOL/L (ref 96–106)
CHOLEST SERPL-MCNC: 154 MG/DL (ref 100–199)
CO2 SERPL-SCNC: 27 MMOL/L (ref 20–29)
CREAT SERPL-MCNC: 0.66 MG/DL (ref 0.57–1)
ERYTHROCYTE [DISTWIDTH] IN BLOOD BY AUTOMATED COUNT: 16.8 % (ref 12.3–15.4)
EST. AVERAGE GLUCOSE BLD GHB EST-MCNC: 126 MG/DL
GLOBULIN SER CALC-MCNC: 2.8 G/DL (ref 1.5–4.5)
GLUCOSE SERPL-MCNC: 77 MG/DL (ref 65–99)
HBA1C MFR BLD: 6 % (ref 4.8–5.6)
HCT VFR BLD AUTO: 36.1 % (ref 34–46.6)
HDLC SERPL-MCNC: 38 MG/DL
HGB BLD-MCNC: 10.6 G/DL (ref 11.1–15.9)
LDLC SERPL CALC-MCNC: 99 MG/DL (ref 0–99)
MCH RBC QN AUTO: 20.5 PG (ref 26.6–33)
MCHC RBC AUTO-ENTMCNC: 29.4 G/DL (ref 31.5–35.7)
MCV RBC AUTO: 70 FL (ref 79–97)
PLATELET # BLD AUTO: 382 X10E3/UL (ref 150–450)
POTASSIUM SERPL-SCNC: 4.5 MMOL/L (ref 3.5–5.2)
PROT SERPL-MCNC: 6.9 G/DL (ref 6–8.5)
RBC # BLD AUTO: 5.17 X10E6/UL (ref 3.77–5.28)
SODIUM SERPL-SCNC: 142 MMOL/L (ref 134–144)
TRIGL SERPL-MCNC: 86 MG/DL (ref 0–149)
TSH SERPL DL<=0.005 MIU/L-ACNC: 1.34 UIU/ML (ref 0.45–4.5)
VLDLC SERPL CALC-MCNC: 17 MG/DL (ref 5–40)
WBC # BLD AUTO: 5.9 X10E3/UL (ref 3.4–10.8)

## 2019-05-27 NOTE — PROGRESS NOTES
Please tell her to start taking Vitamin D 3 1000 I.U daily OTC. Also , needs to eat iron rich food ( Apples, spinach etc.. ) Will discuss labs in detail on her upcoming visit.

## 2019-05-28 DIAGNOSIS — I10 ESSENTIAL HYPERTENSION: ICD-10-CM

## 2019-05-28 RX ORDER — SPIRONOLACTONE 25 MG/1
25 TABLET ORAL DAILY
Qty: 30 TAB | Refills: 0 | Status: SHIPPED | OUTPATIENT
Start: 2019-05-28 | End: 2019-06-21 | Stop reason: SDUPTHER

## 2019-05-28 NOTE — TELEPHONE ENCOUNTER
Patient states she had not picked up this RX because she needs it sent to the [facility] and not the [facility]. Advised patient this would be re-sent per her request. End of encounter.

## 2019-05-28 NOTE — PROGRESS NOTES
Patient returns call, and above results and recommendations discussed. Patient advises that she is already taking a Vit D supplement as was recommended by her nutritionist and states that she will try to consume more iron rich foods. Additionally, she inquires about her aldactone prescription, stating that she never picked it up because it was sent to [facility] when she needed it [facility] [place] [address]. Advised this was resubmitted to Dr. Tamika Green as a refill request for the requested pharmacy. Patient confirms that she intends on keeping her 06/21/2019 follow-up appointment with Dr. Tamiak Green. Encouraged to call the office as needed. End of encounter.

## 2019-06-21 ENCOUNTER — OFFICE VISIT (OUTPATIENT)
Dept: PRIMARY CARE CLINIC | Age: 34
End: 2019-06-21

## 2019-06-21 VITALS
HEIGHT: 69 IN | DIASTOLIC BLOOD PRESSURE: 82 MMHG | OXYGEN SATURATION: 96 % | RESPIRATION RATE: 16 BRPM | BODY MASS INDEX: 43.4 KG/M2 | TEMPERATURE: 98.6 F | WEIGHT: 293 LBS | HEART RATE: 93 BPM | SYSTOLIC BLOOD PRESSURE: 130 MMHG

## 2019-06-21 DIAGNOSIS — I10 ESSENTIAL HYPERTENSION: ICD-10-CM

## 2019-06-21 DIAGNOSIS — G47.33 SLEEP APNEA, OBSTRUCTIVE: Primary | ICD-10-CM

## 2019-06-21 DIAGNOSIS — E55.9 VITAMIN D DEFICIENCY: ICD-10-CM

## 2019-06-21 DIAGNOSIS — E66.01 OBESITY, MORBID, BMI 50 OR HIGHER (HCC): ICD-10-CM

## 2019-06-21 DIAGNOSIS — F31.75 BIPOLAR DISORDER, IN PARTIAL REMISSION, MOST RECENT EPISODE DEPRESSED (HCC): ICD-10-CM

## 2019-06-21 RX ORDER — LIRAGLUTIDE 6 MG/ML
INJECTION, SOLUTION SUBCUTANEOUS
Refills: 2 | COMMUNITY
Start: 2019-06-06 | End: 2020-09-09 | Stop reason: ALTCHOICE

## 2019-06-21 RX ORDER — TOPIRAMATE 25 MG/1
25 TABLET ORAL 2 TIMES DAILY
Qty: 60 TAB | Refills: 0 | COMMUNITY
Start: 2019-06-21 | End: 2019-09-23 | Stop reason: ALTCHOICE

## 2019-06-21 RX ORDER — SPIRONOLACTONE 25 MG/1
25 TABLET ORAL DAILY
Qty: 90 TAB | Refills: 0 | Status: SHIPPED | OUTPATIENT
Start: 2019-06-21 | End: 2019-08-07 | Stop reason: SDUPTHER

## 2019-06-21 NOTE — PROGRESS NOTES
Chief Complaint   Patient presents with    Follow-up     on blood pressure and weight loss. patient has lost 12 pounds and is wanting to talk about blood sugars and has sleep study results.   copied and placed for md review

## 2019-06-21 NOTE — PROGRESS NOTES
Written by Hector Mendes, as dictated by Luisito Diaz MD.    [facility]  Alise Sampson is a [address] boss female. HPI   Pt presents today for follow up visit. Pt had a recent inpatient sleep study on 06/13/2019. Pt reports that she felt tired, but had difficulty sleeping d/t the new environment. She was given a small dose of ambien and then fell asleep quickly. She reports that the nurse called her yesterday to arrange for pt to start using C-PAP, but she does not have it yet. Pt is now taking aldactone. Initial BP in office today is 145/89, repeat BP was 130/82. Pt reports BP yesterday was 139/87. She denies any LE cramping, polyuria. She admits that she does not drink much water. She reports that she has been exercising 2-3x/week and does 10k+ steps daily at work. She also improved her diet. Pt's weight has decreased from 399lbs on 05/22/19 to 387lbs today. She was started on Saxenda by other doctor. Pt reports feeling hot and \"excessive\" diaphoresis since starting this medication. She checked her BG yesterday, reports that it was 77. She denies nausea and fatigue. Pt has been seen by a psychiatrist since last visit and is now taking topomax for mood disorder. Pt is taking 25mg every day in the evening, has been told that she can take BID if needed. She denies any change in mentation. She has been taking Vitamin D as prescribed by her nutritionist, once weekly for 6 weeks, then daily.     Patient Active Problem List   Diagnosis Code    Posttraumatic stress disorder F43.10    Somatization disorder F45.0    Attention deficit disorder with hyperactivity(314.01) F90.9    Unspecified asthma(493.90) J45.909    Drug overdose, intentional ([address] T50.902A    Obesity, morbid, BMI 50 or higher ([address] E66.01    Bipolar disorder, in partial remission, most recent episode depressed ([address] F31.75        Current Outpatient Medications on File Prior to Visit   Medication Sig Dispense Refill    SAXENDA 3 mg/0.5 mL (18 mg/3 mL) pen WEEK 1 INJECT 0.6 MG SUBCUTANEOUSLY ONCE DAILY FOR 7 DAYS WEEK 2 INJECT 1.2 MG ONCE DAILY FOR 7 DAYS WEEK 3 INJECT 1.8 MG ONCE DAILY FOR 7 D  2    topiramate (TOPAMAX) 25 mg tablet Take 1 Tab by mouth two (2) times a day. 60 Tab 0    spironolactone (ALDACTONE) 25 mg tablet Take 1 Tab by mouth daily for 30 days. 30 Tab 0    metFORMIN (GLUCOPHAGE) 1,000 mg tablet Take 2,000 mg by mouth daily. Indications: disease of ovaries with cysts       No current facility-administered medications on file prior to visit.         Allergies   Allergen Reactions    Bactrim [Sulfamethoprim Ds] Hives    Tape [Adhesive] Rash       Past Medical History:   Diagnosis Date    Anemia     Asthma     10 yrs old    Attention deficit disorder with hyperactivity(314.01) 10/31/2013    Bipolar I disorder, most recent episode (or current) manic, unspecified 10/31/2013    [facility]    Depression 9/25/13    bipolar disorder    Diabetes ([address]     Essential hypertension     with first pregnacy    Mood disorder ([address] 9/25/2013    Morbid obesity ([address]     PCOS (polycystic ovarian syndrome)     A1c 6.6 4/2015    Personality disorder ([address] [facility]      histrionic              Berhane Huong    Posttraumatic stress disorder 10/31/2013    Prediabetes 12/31/13    A1c 6.3 per lab   6.6 4/2015    Somatization disorder 10/31/2013    Unspecified personality disorder 10/31/2013    Unspecified sleep apnea     childhood --had T&A done       Past Surgical History:   Procedure Laterality Date    HX GYN  2013    BTL    HX GYN      D & C    HX HYSTERECTOMY  2/25/15    Spasic at [facility] HX TONSILLECTOMY  2007       Family History   Problem Relation Age of Onset    Diabetes Maternal Grandmother     Lung Disease Maternal Grandfather     Cancer Paternal Grandmother         Pancreatic    Diabetes Father     Anesth Problems Neg Hx        Social History     Socioeconomic History    Marital status:      Spouse name: Not on file    Number of children: Not on file    Years of education: Not on file    Highest education level: Not on file   Occupational History    Not on file   Social Needs    Financial resource strain: Not on file    Food insecurity:     Worry: Not on file     Inability: Not on file    Transportation needs:     Medical: Not on file     Non-medical: Not on file   Tobacco Use    Smoking status: Never Smoker    Smokeless tobacco: Never Used   Substance and Sexual Activity    Alcohol use: No    Drug use: No    Sexual activity: Yes     Partners: Male     Birth control/protection: Condom, None   Lifestyle    Physical activity:     Days per week: Not on file     Minutes per session: Not on file    Stress: Not on file   Relationships    Social connections:     Talks on phone: Not on file     Gets together: Not on file     Attends Episcopal service: Not on file     Active member of club or organization: Not on file     Attends meetings of clubs or organizations: Not on file     Relationship status: Not on file    Intimate partner violence:     Fear of current or ex partner: Not on file     Emotionally abused: Not on file     Physically abused: Not on file     Forced sexual activity: Not on file   Other Topics Concern    Not on file   Social History Narrative    Not on file       Office Visit on 05/22/2019   Component Date Value Ref Range Status    Glucose 05/22/2019 77  65 - 99 mg/dL Final    BUN 05/22/2019 11  6 - 20 mg/dL Final    Creatinine 05/22/2019 0.66  0.57 - 1.00 mg/dL Final    GFR est non-AA 05/22/2019 117  >59 mL/min/1.73 Final    GFR est AA 05/22/2019 135  >59 mL/min/1.73 Final    BUN/Creatinine ratio 05/22/2019 17  9 - 23 Final    Sodium 05/22/2019 142  134 - 144 mmol/L Final    Potassium 05/22/2019 4.5  3.5 - 5.2 mmol/L Final    Chloride 05/22/2019 102  96 - 106 mmol/L Final    CO2 05/22/2019 27  20 - 29 mmol/L Final    Calcium 05/22/2019 9.3  8.7 - 10.2 mg/dL Final    Protein, total 05/22/2019 6.9  6.0 - 8.5 g/dL Final    Albumin 05/22/2019 4.1  3.5 - 5.5 g/dL Final    GLOBULIN, TOTAL 05/22/2019 2.8  1.5 - 4.5 g/dL Final    A-G Ratio 05/22/2019 1.5  1.2 - 2.2 Final    Bilirubin, total 05/22/2019 <0.2  0.0 - 1.2 mg/dL Final    Alk. phosphatase 05/22/2019 62  39 - 117 IU/L Final    AST (SGOT) 05/22/2019 15  0 - 40 IU/L Final    ALT (SGPT) 05/22/2019 10  0 - 32 IU/L Final    WBC 05/22/2019 5.9  3.4 - 10.8 x10E3/uL Final    RBC 05/22/2019 5.17  3.77 - 5.28 x10E6/uL Final    HGB 05/22/2019 10.6* 11.1 - 15.9 g/dL Final    HCT 05/22/2019 36.1  34.0 - 46.6 % Final    MCV 05/22/2019 70* 79 - 97 fL Final    MCH 05/22/2019 20.5* 26.6 - 33.0 pg Final    MCHC 05/22/2019 29.4* 31.5 - 35.7 g/dL Final    RDW 05/22/2019 16.8* 12.3 - 15.4 % Final    PLATELET 92/31/6755 141  150 - 450 x10E3/uL Final                  **Please note reference interval change**    Cholesterol, total 05/22/2019 154  100 - 199 mg/dL Final    Triglyceride 05/22/2019 86  0 - 149 mg/dL Final    HDL Cholesterol 05/22/2019 38* >39 mg/dL Final    VLDL, calculated 05/22/2019 17  5 - 40 mg/dL Final    LDL, calculated 05/22/2019 99  0 - 99 mg/dL Final    Hemoglobin A1c 05/22/2019 6.0* 4.8 - 5.6 % Final    Comment:          Prediabetes: 5.7 - 6.4           Diabetes: >6.4           Glycemic control for adults with diabetes: <7.0      Estimated average glucose 05/22/2019 126  mg/dL Final    TSH 05/22/2019 1.340  0.450 - 4.500 uIU/mL Final    VITAMIN D, 25-HYDROXY 05/22/2019 17.1* 30.0 - 100.0 ng/mL Final    Comment: Vitamin D deficiency has been defined by the [address] practice guideline as a  level of serum 25-OH vitamin D less than 20 ng/mL (1,2). The Endocrine Society went on to further define vitamin D  insufficiency as a level between 21 and 29 ng/mL (2). 1. IOM ([place] of [employer]). 2010.  Dietary reference     intakes for calcium and D. [facility]: The     [place]. 2. Dana MF, Cesario NC, Saad ROLDAN, et al.     Evaluation, treatment, and prevention of vitamin D     deficiency: an Endocrine Society clinical practice     guideline. JCEM. 2011 Jul; 96(7):1911-30. Review of Systems   Constitutional: Positive for diaphoresis. Negative for malaise/fatigue. HENT: Negative for congestion. Eyes: Negative for blurred vision and pain. Respiratory: Negative for cough and shortness of breath. Cardiovascular: Negative for chest pain and palpitations. Gastrointestinal: Negative for abdominal pain and heartburn. Genitourinary: Negative for frequency and urgency. Musculoskeletal: Negative for joint pain and myalgias. Neurological: Negative for dizziness, tingling, sensory change, weakness and headaches. Psychiatric/Behavioral: Negative for depression, memory loss and substance abuse. Visit Vitals  /82 (BP 1 Location: Right arm, BP Patient Position: Sitting)   Pulse 93   Temp 98.6 °F (37 °C) (Oral)   Resp 16   Ht 5' 9\" (1.753 m)   Wt (!) 387 lb 3.2 oz (175.6 kg)   LMP 02/18/2015 Comment: bleeding consistently for 7 months now since 7/2014   SpO2 96%   BMI 57.18 kg/m²       Physical Exam   Constitutional: She is oriented to person, place, and time. She appears well-developed and well-nourished. No distress. HENT:   Right Ear: External ear normal.   Left Ear: External ear normal.   Eyes: Conjunctivae and EOM are normal. Right eye exhibits no discharge. Left eye exhibits no discharge. Neck: Normal range of motion. Neck supple. Cardiovascular: Normal rate and regular rhythm. Pulmonary/Chest: Effort normal and breath sounds normal. She has no wheezes. Abdominal: Soft. Bowel sounds are normal. There is no tenderness. Lymphadenopathy:     She has no cervical adenopathy. Neurological: She is alert and oriented to person, place, and time. Skin: She is not diaphoretic.    Psychiatric: She has a normal mood and affect. Her behavior is normal.   Nursing note and vitals reviewed. ASSESSMENT and PLAN    ICD-10-CM ICD-9-CM    1. Sleep apnea, obstructive G47.33 327.23 Advised pt to continue with plan s/p sleep study to start using CPAP. 2. Essential hypertension I10 401.9 Will continue to monitor. Controlled on Aldactone. 3. Obesity, morbid, BMI 50 or higher ([facility] E66.01 278.01 Advised pt to continue with Saxenda and speak with prescribing provider about diaphoresis. 4. Bipolar disorder, in partial remission, most recent episode depressed (HCC) F31.75 296.55 topiramate (TOPAMAX) 25 mg tablet  Advised pt to continue taking every day-BID. 5. Vitamin D deficiency E55.9 268.9 Continue vitamin D once a week dose. This plan was reviewed with the patient and patient agrees. All questions were answered. This scribe documentation was reviewed by me and accurately reflects the examination and decisions made by me. This note will not be viewable in [address]

## 2019-08-07 ENCOUNTER — OFFICE VISIT (OUTPATIENT)
Dept: PRIMARY CARE CLINIC | Age: 34
End: 2019-08-07

## 2019-08-07 VITALS
WEIGHT: 293 LBS | BODY MASS INDEX: 43.4 KG/M2 | HEART RATE: 81 BPM | SYSTOLIC BLOOD PRESSURE: 131 MMHG | HEIGHT: 69 IN | OXYGEN SATURATION: 98 % | RESPIRATION RATE: 18 BRPM | TEMPERATURE: 98.7 F | DIASTOLIC BLOOD PRESSURE: 81 MMHG

## 2019-08-07 DIAGNOSIS — D50.9 IRON DEFICIENCY ANEMIA, UNSPECIFIED IRON DEFICIENCY ANEMIA TYPE: ICD-10-CM

## 2019-08-07 DIAGNOSIS — E28.2 PCOD (POLYCYSTIC OVARIAN DISEASE): ICD-10-CM

## 2019-08-07 DIAGNOSIS — E66.01 OBESITY, MORBID, BMI 50 OR HIGHER (HCC): ICD-10-CM

## 2019-08-07 DIAGNOSIS — E55.9 VITAMIN D DEFICIENCY: ICD-10-CM

## 2019-08-07 DIAGNOSIS — I10 ESSENTIAL HYPERTENSION: Primary | ICD-10-CM

## 2019-08-07 RX ORDER — SPIRONOLACTONE 25 MG/1
25 TABLET ORAL 2 TIMES DAILY
Qty: 180 TAB | Refills: 0 | Status: SHIPPED | OUTPATIENT
Start: 2019-08-07 | End: 2019-09-23 | Stop reason: DRUGHIGH

## 2019-08-07 RX ORDER — NALTREXONE HYDROCHLORIDE 50 MG/1
50 TABLET, FILM COATED ORAL DAILY
COMMUNITY
End: 2019-09-23 | Stop reason: ALTCHOICE

## 2019-08-07 NOTE — PROGRESS NOTES
Written by Alexander Hawk, as dictated by Dr. Manuel Clay MD.    Greer Bernard is a 36 y.o. female. HPI  The patient presents today for follow-up BP was good in the office today. Pt reports having a headache today in office. She took 2 tab spironolactone 25 mg this morning. Pt took her BP at work as 144/99 on her hand/wrist. She reports that they do not have the right cuff size for her arm. Pt denies polyuria. Pt has a history of polycystic ovarian syndrome. She has been working with Dr. Patria Allen (OB/GYN). Compliant on Metformin 1,000 mg. Pt weighs 381 lbs today, which is decreased from 387 lbs in 06/21/19. Pt is not using weight watchers. . She was told to not go over 1500 calories daily. She does not eat breakfast or dinner due to decreased appetite and eats lunch at work. Pt does not take multivitamins. Compliant on naltrexone 50 mg has helped her with her cravings and Saxenda 3 mg/0.5 mL. Compliant on Topamax 25 mg. Pt reports a history of anemia since she was young. She states that she like eating ice. Pt had a partial hysterectomy, and does not have a menstrual cycle. She reports to eat spinach in her salads, but does not eat apples. Patient Active Problem List   Diagnosis Code    Posttraumatic stress disorder F43.10    Somatization disorder F45.0    Attention deficit disorder with hyperactivity(314.01) F90.9    Unspecified asthma(493.90) J45.909    Obesity, morbid, BMI 50 or higher ([facility] E66.01    Bipolar disorder, in partial remission, most recent episode depressed ([facility] F31.75        Current Outpatient Medications on File Prior to Visit   Medication Sig Dispense Refill    naltrexone (DEPADE) 50 mg tablet Take 50 mg by mouth daily.       SAXENDA 3 mg/0.5 mL (18 mg/3 mL) pen WEEK 1 INJECT 0.6 MG SUBCUTANEOUSLY ONCE DAILY FOR 7 DAYS WEEK 2 INJECT 1.2 MG ONCE DAILY FOR 7 DAYS WEEK 3 INJECT 1.8 MG ONCE DAILY FOR 7 D  2    topiramate (TOPAMAX) 25 mg tablet Take 1 Tab by mouth two (2) times a day. 60 Tab 0    metFORMIN (GLUCOPHAGE) 1,000 mg tablet Take 2,000 mg by mouth daily. Indications: disease of ovaries with cysts       No current facility-administered medications on file prior to visit.         Allergies   Allergen Reactions    Bactrim [Sulfamethoprim Ds] Hives    Tape [Adhesive] Rash       Past Medical History:   Diagnosis Date    Anemia     Asthma     10 yrs old    Attention deficit disorder with hyperactivity(314.01) 10/31/2013    Bipolar I disorder, most recent episode (or current) manic, unspecified 10/31/2013    [address]    Depression 9/25/13    bipolar disorder    Diabetes ([facility]     Essential hypertension     with first pregnacy    Mood disorder ([facility] 9/25/2013    Morbid obesity ([facility]     PCOS (polycystic ovarian syndrome)     A1c 6.6 4/2015    Personality disorder ([facility]      histrionic              Shanell Sires    Posttraumatic stress disorder 10/31/2013    Prediabetes 12/31/13    A1c 6.3 per lab   6.6 4/2015    Somatization disorder 10/31/2013    Unspecified personality disorder 10/31/2013    Unspecified sleep apnea     childhood --had T&A done       Past Surgical History:   Procedure Laterality Date    HX GYN  2013    BTL    HX GYN      D & C    HX HYSTERECTOMY  2/25/15    Spasic at  [facility] TONSILLECTOMY  2007       Family History   Problem Relation Age of Onset    Diabetes Maternal Grandmother     Lung Disease Maternal Grandfather     Cancer Paternal Grandmother         Pancreatic    Diabetes Father     Anesth Problems Neg Hx        Social History     Socioeconomic History    Marital status:      Spouse name: Not on file    Number of children: Not on file    Years of education: Not on file    Highest education level: Not on file   Occupational History    Not on file   Social Needs    Financial resource strain: Not on file    Food insecurity:     Worry: Not on file Inability: Not on file    Transportation needs:     Medical: Not on file     Non-medical: Not on file   Tobacco Use    Smoking status: Never Smoker    Smokeless tobacco: Never Used   Substance and Sexual Activity    Alcohol use: No    Drug use: No    Sexual activity: Yes     Partners: Male     Birth control/protection: Condom, None   Lifestyle    Physical activity:     Days per week: Not on file     Minutes per session: Not on file    Stress: Not on file   Relationships    Social connections:     Talks on phone: Not on file     Gets together: Not on file     Attends Mu-ism service: Not on file     Active member of club or organization: Not on file     Attends meetings of clubs or organizations: Not on file     Relationship status: Not on file    Intimate partner violence:     Fear of current or ex partner: Not on file     Emotionally abused: Not on file     Physically abused: Not on file     Forced sexual activity: Not on file   Other Topics Concern    Not on file   Social History Narrative    Not on file       Office Visit on 05/22/2019   Component Date Value Ref Range Status    Glucose 05/22/2019 77  65 - 99 mg/dL Final    BUN 05/22/2019 11  6 - 20 mg/dL Final    Creatinine 05/22/2019 0.66  0.57 - 1.00 mg/dL Final    GFR est non-AA 05/22/2019 117  >59 mL/min/1.73 Final    GFR est AA 05/22/2019 135  >59 mL/min/1.73 Final    BUN/Creatinine ratio 05/22/2019 17  9 - 23 Final    Sodium 05/22/2019 142  134 - 144 mmol/L Final    Potassium 05/22/2019 4.5  3.5 - 5.2 mmol/L Final    Chloride 05/22/2019 102  96 - 106 mmol/L Final    CO2 05/22/2019 27  20 - 29 mmol/L Final    Calcium 05/22/2019 9.3  8.7 - 10.2 mg/dL Final    Protein, total 05/22/2019 6.9  6.0 - 8.5 g/dL Final    Albumin 05/22/2019 4.1  3.5 - 5.5 g/dL Final    GLOBULIN, TOTAL 05/22/2019 2.8  1.5 - 4.5 g/dL Final    A-G Ratio 05/22/2019 1.5  1.2 - 2.2 Final    Bilirubin, total 05/22/2019 <0.2  0.0 - 1.2 mg/dL Final    Alk. phosphatase 05/22/2019 62  39 - 117 IU/L Final    AST (SGOT) 05/22/2019 15  0 - 40 IU/L Final    ALT (SGPT) 05/22/2019 10  0 - 32 IU/L Final    WBC 05/22/2019 5.9  3.4 - 10.8 x10E3/uL Final    RBC 05/22/2019 5.17  3.77 - 5.28 x10E6/uL Final    HGB 05/22/2019 10.6* 11.1 - 15.9 g/dL Final    HCT 05/22/2019 36.1  34.0 - 46.6 % Final    MCV 05/22/2019 70* 79 - 97 fL Final    MCH 05/22/2019 20.5* 26.6 - 33.0 pg Final    MCHC 05/22/2019 29.4* 31.5 - 35.7 g/dL Final    RDW 05/22/2019 16.8* 12.3 - 15.4 % Final    PLATELET 65/26/1106 685  150 - 450 x10E3/uL Final    Cholesterol, total 05/22/2019 154  100 - 199 mg/dL Final    Triglyceride 05/22/2019 86  0 - 149 mg/dL Final    HDL Cholesterol 05/22/2019 38* >39 mg/dL Final    VLDL, calculated 05/22/2019 17  5 - 40 mg/dL Final    LDL, calculated 05/22/2019 99  0 - 99 mg/dL Final    Hemoglobin A1c 05/22/2019 6.0* 4.8 - 5.6 % Final    Estimated average glucose 05/22/2019 126  mg/dL Final    TSH 05/22/2019 1.340  0.450 - 4.500 uIU/mL Final    VITAMIN D, 25-HYDROXY 05/22/2019 17.1* 30.0 - 100.0 ng/mL Final       Review of Systems   Constitutional: Positive for weight loss (intentional). Negative for malaise/fatigue. HENT: Negative for congestion. Eyes: Negative for blurred vision and pain. Respiratory: Negative for cough and shortness of breath. Cardiovascular: Negative for chest pain and palpitations. Gastrointestinal: Negative for abdominal pain and heartburn. Genitourinary: Negative for frequency and urgency. Musculoskeletal: Negative for joint pain and myalgias. Neurological: Positive for headaches. Negative for dizziness, tingling, sensory change and weakness. Psychiatric/Behavioral: Negative for depression, memory loss and substance abuse.      Visit Vitals  /81 (BP 1 Location: Left arm, BP Patient Position: Sitting)   Pulse 81   Temp 98.7 °F (37.1 °C) (Oral)   Resp 18   Ht 5' 9\" (1.753 m)   Wt (!) 381 lb (172.8 kg)   LMP 02/18/2015 Comment: bleeding consistently for 7 months now since 7/2014   SpO2 98%   BMI 56.26 kg/m²       Physical Exam   Constitutional: She is oriented to person, place, and time. She appears well-developed. No distress. Morbidly obese   HENT:   Right Ear: External ear normal.   Left Ear: External ear normal.   Eyes: Conjunctivae and EOM are normal. Right eye exhibits no discharge. Left eye exhibits no discharge. Neck: Normal range of motion. Neck supple. Cardiovascular: Normal rate, regular rhythm and normal heart sounds. Pulmonary/Chest: Effort normal and breath sounds normal. She has no wheezes. Abdominal: Soft. Bowel sounds are normal. There is no tenderness. Lymphadenopathy:     She has no cervical adenopathy. Neurological: She is alert and oriented to person, place, and time. Skin: She is not diaphoretic. Psychiatric: She has a normal mood and affect. Her behavior is normal.   Nursing note and vitals reviewed. ASSESSMENT and PLAN    ICD-10-CM ICD-9-CM    1. Essential hypertension C13 767.1 METABOLIC PANEL, COMPREHENSIVE      spironolactone (ALDACTONE) 25 mg tablet sent to pharmacy. CMP ordered. Spironolactone 25 mg refilled. BP is well-controlled on current medication. No change to dosage at this time. Pt should take 1 tab BID. 2. PCOD (polycystic ovarian disease) E28.2 256.4 Followed by OB/GYN. 3. Obesity, morbid, BMI 50 or higher ([facility]) E66.01 278.01 Commended on weight loss. 4. Vitamin D deficiency E55.9 268.9 Patient should take OTC 1,000 iu vitamin D3 daily. Discussed that if she forgets to take it, she can take 2-3 doses at once. 5. Iron deficiency anemia, unspecified iron deficiency anemia type D50.9 280.9 IRON PROFILE      FERRITIN      CBC W/O DIFF    Iron profile, Ferritin and CBC ordered. Advised iron rich diet. This plan was reviewed with the patient and patient agrees. All questions were answered.     This scribe documentation was reviewed by me and accurately reflects the examination and decisions made by me. This note will not be viewable in [address]

## 2019-08-07 NOTE — PROGRESS NOTES
Visit Vitals  /81 (BP 1 Location: Left arm, BP Patient Position: Sitting)   Pulse 81   Temp 98.7 °F (37.1 °C) (Oral)   Resp 18   Ht 5' 9\" (1.753 m)   Wt (!) 381 lb (172.8 kg)   SpO2 98%   BMI 56.26 kg/m²           Chief Complaint   Patient presents with    Follow Up Chronic Condition     Hypertension     Medication Evaluation     Spirolactone; took 50 mg x 1 dose per Dr. Jessica Bell, wondering if she needs a anti-diuretic as well          HM Due WNL/Reviewed. Patient is not interested in [address] [place] [address] [employer] at this time. 1. Have you been to the ER, urgent care clinic since your last visit? Hospitalized since your last visit? Denies     2. Have you seen or consulted any other health care providers outside of the [address] [place] [address] since your last visit? Include any pap smears or colon screening.  OB/GYN at [facility]

## 2019-08-09 LAB
ALBUMIN SERPL-MCNC: 4 G/DL (ref 3.5–5.5)
ALBUMIN/GLOB SERPL: 1.4 {RATIO} (ref 1.2–2.2)
ALP SERPL-CCNC: 58 IU/L (ref 39–117)
ALT SERPL-CCNC: 8 IU/L (ref 0–32)
AST SERPL-CCNC: 10 IU/L (ref 0–40)
BILIRUB SERPL-MCNC: <0.2 MG/DL (ref 0–1.2)
BUN SERPL-MCNC: 9 MG/DL (ref 6–20)
BUN/CREAT SERPL: 12 (ref 9–23)
CALCIUM SERPL-MCNC: 8.9 MG/DL (ref 8.7–10.2)
CHLORIDE SERPL-SCNC: 102 MMOL/L (ref 96–106)
CO2 SERPL-SCNC: 23 MMOL/L (ref 20–29)
CREAT SERPL-MCNC: 0.73 MG/DL (ref 0.57–1)
ERYTHROCYTE [DISTWIDTH] IN BLOOD BY AUTOMATED COUNT: 16.1 % (ref 12.3–15.4)
FERRITIN SERPL-MCNC: 37 NG/ML (ref 15–150)
GLOBULIN SER CALC-MCNC: 2.8 G/DL (ref 1.5–4.5)
GLUCOSE SERPL-MCNC: 76 MG/DL (ref 65–99)
HCT VFR BLD AUTO: 35.4 % (ref 34–46.6)
HGB BLD-MCNC: 10.2 G/DL (ref 11.1–15.9)
IRON SATN MFR SERPL: 9 % (ref 15–55)
IRON SERPL-MCNC: 26 UG/DL (ref 27–159)
MCH RBC QN AUTO: 20.5 PG (ref 26.6–33)
MCHC RBC AUTO-ENTMCNC: 28.8 G/DL (ref 31.5–35.7)
MCV RBC AUTO: 71 FL (ref 79–97)
PLATELET # BLD AUTO: 361 X10E3/UL (ref 150–450)
POTASSIUM SERPL-SCNC: 4.3 MMOL/L (ref 3.5–5.2)
PROT SERPL-MCNC: 6.8 G/DL (ref 6–8.5)
RBC # BLD AUTO: 4.97 X10E6/UL (ref 3.77–5.28)
SODIUM SERPL-SCNC: 140 MMOL/L (ref 134–144)
TIBC SERPL-MCNC: 283 UG/DL (ref 250–450)
UIBC SERPL-MCNC: 257 UG/DL (ref 131–425)
WBC # BLD AUTO: 6.6 X10E3/UL (ref 3.4–10.8)

## 2019-08-12 NOTE — PROGRESS NOTES
Please let her know iron levels came back low. Needs to take iron tablets . If she can`t take regular iron tablets I will recommend kids iron gummies and iron rich food ( like spinach , apples ).

## 2019-08-13 NOTE — PROGRESS NOTES
Called and spoke with patient and discussed above results and recommendations. Patient request a copy of labs be mailed. Address confirmed. No other questions or concerns voiced. Encouraged to call the office as needed. End of encounter.

## 2019-09-23 ENCOUNTER — OFFICE VISIT (OUTPATIENT)
Dept: PRIMARY CARE CLINIC | Age: 34
End: 2019-09-23

## 2019-09-23 VITALS
SYSTOLIC BLOOD PRESSURE: 115 MMHG | DIASTOLIC BLOOD PRESSURE: 80 MMHG | WEIGHT: 293 LBS | RESPIRATION RATE: 16 BRPM | OXYGEN SATURATION: 98 % | BODY MASS INDEX: 43.4 KG/M2 | HEIGHT: 69 IN | HEART RATE: 93 BPM | TEMPERATURE: 98.3 F

## 2019-09-23 DIAGNOSIS — K59.03 DRUG-INDUCED CONSTIPATION: ICD-10-CM

## 2019-09-23 DIAGNOSIS — E66.01 OBESITY, MORBID, BMI 50 OR HIGHER (HCC): ICD-10-CM

## 2019-09-23 DIAGNOSIS — Z91.14 NON COMPLIANCE W MEDICATION REGIMEN: ICD-10-CM

## 2019-09-23 DIAGNOSIS — I10 ESSENTIAL HYPERTENSION: Primary | ICD-10-CM

## 2019-09-23 DIAGNOSIS — D50.8 OTHER IRON DEFICIENCY ANEMIA: ICD-10-CM

## 2019-09-23 DIAGNOSIS — J45.909 ASTHMA DUE TO ENVIRONMENTAL ALLERGIES: ICD-10-CM

## 2019-09-23 RX ORDER — SPIRONOLACTONE 50 MG/1
50 TABLET, FILM COATED ORAL DAILY
Qty: 90 TAB | Refills: 0 | Status: SHIPPED | OUTPATIENT
Start: 2019-09-23 | End: 2020-06-12 | Stop reason: SDUPTHER

## 2019-09-23 RX ORDER — LANOLIN ALCOHOL/MO/W.PET/CERES
325 CREAM (GRAM) TOPICAL
Qty: 90 TAB | Refills: 0 | Status: SHIPPED | OUTPATIENT
Start: 2019-09-23 | End: 2019-12-22

## 2019-09-23 RX ORDER — SENNOSIDES 8.6 MG/1
1 TABLET ORAL DAILY
Qty: 30 TAB | Refills: 2 | Status: SHIPPED | OUTPATIENT
Start: 2019-09-23 | End: 2019-10-23

## 2019-09-23 NOTE — PROGRESS NOTES
1. Have you been to the ER, urgent care clinic since your last visit? Hospitalized since your last visit? No    2. Have you seen or consulted any other health care providers outside of the [address] [place] [address] since your last visit? Include any pap smears or colon screening.  No     Chief Complaint   Patient presents with    Hypertension     1 1/2 month follow up    Anemia     1 1/2 month follow up; pt has not started any iron pills     Fasting

## 2019-09-23 NOTE — PROGRESS NOTES
Written by Lianna Mclaughlin, as dictated by Dr. Park Alvares MD.    Shalini Barajas is a 36 y.o. female. HPI  The patient presents today for 3 month follow-up. She has lost 10 lbs. She weighs 371 lbs today, and weighed 381 lbs on 08/07/19. She still reports having issues with hunger, but has been working to restrict her calorie intake. She is seeing a nutritionist in Dr[facility] clinic, who has been helping her figure out what work best for her. She has stopped taking Naltrexone due to crying spells while on naltrexone. Her next appointment is 10/01/19. She has tried Wellbutrin in the past, but became manic while on it. She has also stopped Topamax 25 mg, and did not see changes in her cravings, but note it make her sleepy. She wants to eventually go through the weight loss surgery, but she needed to be on Saxenda for 6 months, with steady weight loss. She is worried about the healing process after By pass surgery, especially as a diabetic. She plans on getting the iron tablets today, as she knows that it can cause fatigue. She has not been consistent with them, as they caused constipation. She will take the tabs with stool softener if needed. She is not taking Metformin 1,000 mg BID, as the pill does not taste good. She is not consistent on Saxenda. She has been having issues swallowing pills, and remaining compliant. She reports some issues with her allergies, but has not needed to use her inhaler for her asthma. BP looks good today. Compliant on 2 tabs Spironolactone 25 mg in the mornings.         Patient Active Problem List   Diagnosis Code    Posttraumatic stress disorder F43.10    Somatization disorder F45.0    Attention deficit disorder with hyperactivity(314.01) F90.9    Obesity, morbid, BMI 50 or higher ([facility] [facility] E66.01    Bipolar disorder, in partial remission, most recent episode depressed ([address] [address] F31.75    Asthma due to environmental allergies J45.909 Current Outpatient Medications on File Prior to Visit   Medication Sig Dispense Refill    SAXENDA 3 mg/0.5 mL (18 mg/3 mL) pen WEEK 1 INJECT 0.6 MG SUBCUTANEOUSLY ONCE DAILY FOR 7 DAYS WEEK 2 INJECT 1.2 MG ONCE DAILY FOR 7 DAYS WEEK 3 INJECT 1.8 MG ONCE DAILY FOR 7 D  2    metFORMIN (GLUCOPHAGE) 1,000 mg tablet Take 2,000 mg by mouth daily. Indications: disease of ovaries with cysts       No current facility-administered medications on file prior to visit.         Allergies   Allergen Reactions    Bactrim [Sulfamethoprim Ds] Hives    Tape [Adhesive] Rash       Past Medical History:   Diagnosis Date    Anemia     Asthma     10 yrs old    Attention deficit disorder with hyperactivity(314.01) 10/31/2013    Bipolar I disorder, most recent episode (or current) manic, unspecified 10/31/2013    [address] [facility]""    Depression 9/25/13    bipolar disorder    Diabetes ([address]     Essential hypertension     with first pregnacy    Mood disorder ([facility] 9/25/2013    Morbid obesity ([facility]     PCOS (polycystic ovarian syndrome)     A1c 6.6 4/2015    Personality disorder ([facility]      histrionic              [place] J[place]    Posttraumatic stress disorder 10/31/2013    Prediabetes 12/31/13    A1c 6.3 per lab   6.6 4/2015    Somatization disorder 10/31/2013    Unspecified personality disorder 10/31/2013    Unspecified sleep apnea     childhood --had T&A done       Past Surgical History:   Procedure Laterality Date    HX GYN  2013    BTL    HX GYN      D & C    HX HYSTERECTOMY  2/25/15    Spasic at [facility] HX TONSILLECTOMY  2007       Family History   Problem Relation Age of Onset    Diabetes Maternal Grandmother     Lung Disease Maternal Grandfather     Cancer Paternal Grandmother         Pancreatic    Diabetes Father     Anesth Problems Neg Hx        Social History     Socioeconomic History    Marital status:      Spouse name: Not on file    Number of children: Not on file    Years of education: Not on file    Highest education level: Not on file   Occupational History    Not on file   Social Needs    Financial resource strain: Not on file    Food insecurity:     Worry: Not on file     Inability: Not on file    Transportation needs:     Medical: Not on file     Non-medical: Not on file   Tobacco Use    Smoking status: Never Smoker    Smokeless tobacco: Never Used   Substance and Sexual Activity    Alcohol use: No    Drug use: No    Sexual activity: Yes     Partners: Male     Birth control/protection: Condom, None   Lifestyle    Physical activity:     Days per week: Not on file     Minutes per session: Not on file    Stress: Not on file   Relationships    Social connections:     Talks on phone: Not on file     Gets together: Not on file     Attends Mosque service: Not on file     Active member of club or organization: Not on file     Attends meetings of clubs or organizations: Not on file     Relationship status: Not on file    Intimate partner violence:     Fear of current or ex partner: Not on file     Emotionally abused: Not on file     Physically abused: Not on file     Forced sexual activity: Not on file   Other Topics Concern    Not on file   Social History Narrative    Not on file       Office Visit on 08/07/2019   Component Date Value Ref Range Status    Glucose 08/08/2019 76  65 - 99 mg/dL Final    BUN 08/08/2019 9  6 - 20 mg/dL Final    Creatinine 08/08/2019 0.73  0.57 - 1.00 mg/dL Final    GFR est non-AA 08/08/2019 109  >59 mL/min/1.73 Final    GFR est AA 08/08/2019 125  >59 mL/min/1.73 Final    BUN/Creatinine ratio 08/08/2019 12  9 - 23 Final    Sodium 08/08/2019 140  134 - 144 mmol/L Final    Potassium 08/08/2019 4.3  3.5 - 5.2 mmol/L Final    Chloride 08/08/2019 102  96 - 106 mmol/L Final    CO2 08/08/2019 23  20 - 29 mmol/L Final    Calcium 08/08/2019 8.9  8.7 - 10.2 mg/dL Final    Protein, total 08/08/2019 6.8  6.0 - 8.5 g/dL Final    Albumin 08/08/2019 4.0  3.5 - 5.5 g/dL Final    GLOBULIN, TOTAL 08/08/2019 2.8  1.5 - 4.5 g/dL Final    A-G Ratio 08/08/2019 1.4  1.2 - 2.2 Final    Bilirubin, total 08/08/2019 <0.2  0.0 - 1.2 mg/dL Final    Alk. phosphatase 08/08/2019 58  39 - 117 IU/L Final    AST (SGOT) 08/08/2019 10  0 - 40 IU/L Final    ALT (SGPT) 08/08/2019 8  0 - 32 IU/L Final    TIBC 08/08/2019 283  250 - 450 ug/dL Final    UIBC 08/08/2019 257  131 - 425 ug/dL Final    Iron 08/08/2019 26* 27 - 159 ug/dL Final    Iron % saturation 08/08/2019 9* 15 - 55 % Final    Ferritin 08/08/2019 37  15 - 150 ng/mL Final    WBC 08/08/2019 6.6  3.4 - 10.8 x10E3/uL Final    RBC 08/08/2019 4.97  3.77 - 5.28 x10E6/uL Final    HGB 08/08/2019 10.2* 11.1 - 15.9 g/dL Final    HCT 08/08/2019 35.4  34.0 - 46.6 % Final    MCV 08/08/2019 71* 79 - 97 fL Final    MCH 08/08/2019 20.5* 26.6 - 33.0 pg Final    MCHC 08/08/2019 28.8* 31.5 - 35.7 g/dL Final    RDW 08/08/2019 16.1* 12.3 - 15.4 % Final    PLATELET 77/36/4169 558  150 - 450 x10E3/uL Final       Review of Systems   Constitutional: Positive for malaise/fatigue and weight loss (intentional). HENT: Negative for congestion. Eyes: Negative for blurred vision. Respiratory: Negative for shortness of breath. Cardiovascular: Negative for chest pain and leg swelling. Gastrointestinal: Positive for constipation. Negative for diarrhea and heartburn. Genitourinary: Negative for dysuria, frequency and urgency. Musculoskeletal: Negative for joint pain and myalgias. Neurological: Negative for dizziness and headaches. Endo/Heme/Allergies: Positive for environmental allergies. Psychiatric/Behavioral: Negative for depression and substance abuse. The patient is not nervous/anxious and does not have insomnia.       Visit Vitals  /80 (BP 1 Location: Left arm, BP Patient Position: Sitting)   Pulse 93   Temp 98.3 °F (36.8 °C) (Oral)   Resp 16   Ht 5' 9\" (1.753 m)   Wt (!) 371 lb 9.6 oz (168.6 kg)   [facility] 02/18/2015 Comment: bleeding consistently for 7 months now since 7/2014   SpO2 98%   BMI 54.88 kg/m²       Physical Exam   Constitutional: She is oriented to person, place, and time. She appears well-developed. No distress. Morbidly obese   HENT:   Head: Normocephalic and atraumatic. Right Ear: External ear normal.   Left Ear: External ear normal.   Eyes: Pupils are equal, round, and reactive to light. Conjunctivae and EOM are normal. Right eye exhibits no discharge. Left eye exhibits no discharge. Neck: Normal range of motion. Neck supple. Cardiovascular: Normal rate, regular rhythm and normal heart sounds. Exam reveals no gallop and no friction rub. No murmur heard. Pulmonary/Chest: Effort normal and breath sounds normal. She has no wheezes. Abdominal: Soft. Bowel sounds are normal. There is no tenderness. Musculoskeletal: Normal range of motion. Neurological: She is alert and oriented to person, place, and time. She has normal reflexes. Skin: She is not diaphoretic. Psychiatric: She has a normal mood and affect. Her behavior is normal. Thought content normal.   Nursing note and vitals reviewed. ASSESSMENT and PLAN    ICD-10-CM ICD-9-CM    1. Essential hypertension I10 401.9 spironolactone (ALDACTONE) 50 mg tablet sent to pharmacy. Spironolactone 50 mg prescribed. Potential side effects were discussed. Pt should take 1 tab daily. 2. Other iron deficiency anemia D50.8 280.8 ferrous sulfate 325 mg (65 mg iron) tablet sent to pharmacy. Ferrous sulfate 325 mg prescribed. Potential side effects were discussed. Pt should take 1 tab daily with food, specifically citrus, to help with absorption of iron. 3. Obesity, morbid, BMI 50 or higher ([facility] E66.01 278.01 Followed by Nutrition. 4. Non compliance w medication regimen Z91.14 V15.81 Blood work will not be taken today, as pt has not been compliant with her medication.     Advised pt to take her medication as prescribed, as fatigue and other symptoms will not improve without them. 5. Drug-induced constipation K59.03 564.09 senna (SENNA LAX) 8.6 mg tablet sent to pharmacy. E980.5 Senna 8.6 mg prescribed. Potential side effects were discussed. Pt should take 1 tab daily as needed for constipation. 6. Asthma due to environmental allergies J45.909 493.90 No treatment at this time. Pt is managing well. This plan was reviewed with the patient and patient agrees. All questions were answered. This scribe documentation was reviewed by me and accurately reflects the examination and decisions made by me. This note will not be viewable in [address]

## 2019-11-19 ENCOUNTER — OFFICE VISIT (OUTPATIENT)
Dept: SURGERY | Age: 34
End: 2019-11-19

## 2019-11-19 VITALS
TEMPERATURE: 98.3 F | WEIGHT: 293 LBS | HEIGHT: 69 IN | HEART RATE: 97 BPM | RESPIRATION RATE: 18 BRPM | BODY MASS INDEX: 43.4 KG/M2 | SYSTOLIC BLOOD PRESSURE: 132 MMHG | OXYGEN SATURATION: 98 % | DIASTOLIC BLOOD PRESSURE: 92 MMHG

## 2019-11-19 DIAGNOSIS — E11.8 CONTROLLED TYPE 2 DIABETES MELLITUS WITH COMPLICATION, WITHOUT LONG-TERM CURRENT USE OF INSULIN (HCC): ICD-10-CM

## 2019-11-19 DIAGNOSIS — E66.01 MORBID OBESITY (HCC): Primary | ICD-10-CM

## 2019-11-19 RX ORDER — LEVOCETIRIZINE DIHYDROCHLORIDE 5 MG/1
TABLET, FILM COATED ORAL
Refills: 3 | COMMUNITY
Start: 2019-11-11 | End: 2021-07-21 | Stop reason: ALTCHOICE

## 2019-11-19 RX ORDER — MONTELUKAST SODIUM 10 MG/1
TABLET ORAL
Refills: 3 | COMMUNITY
Start: 2019-11-11 | End: 2021-07-21 | Stop reason: ALTCHOICE

## 2019-11-19 RX ORDER — TOPIRAMATE 25 MG/1
TABLET ORAL
Refills: 2 | COMMUNITY
Start: 2019-11-06 | End: 2019-12-16 | Stop reason: ALTCHOICE

## 2019-11-19 NOTE — PROGRESS NOTES
HISTORY OF PRESENT ILLNESS  Emeka Saeed is new patient presents today for evaluation for weight loss surgery. Patient has been overweight since childhood. Her weight has ranged from 300-425 lbs. Her heaviest weight is 425 lbs. Emeka Saeed  Body composition    female  36 y.o. Vitals:    11/19/19 0955   Height: 5' 9\" (1.753 m)     Body mass index is 54.88 kg/m². Kavin Aric Neck- 15in   Waist- 53in   Hips- 53in     Dietary Habits:  Breakfast- skips  Lunch- salad, BBQ chicken, fried chicken, GIANLUCA's (supersize)  Dinner- skip  Snacks- skinny pop  Liquids- water, pink lemonade, OJ/cranberry juice. , sweet tea     Prior weight loss attempts: Physican supervised diet, atkins, prescription diet pills, and unsupervised diet. She believes Let's Jock Jose works the best for her. She has lost 50 lbs. Patient has an advanced directive:  Not on file. Ms. Sukhdev Pena has a reminder for a \"due or due soon\" health maintenance. I have asked that she contact her primary care provider for follow-up on this health maintenance.       Patient Active Problem List   Diagnosis Code    Posttraumatic stress disorder F43.10    Somatization disorder F45.0    Attention deficit disorder with hyperactivity(314.01) F90.9    Obesity, morbid, BMI 50 or higher ([address] E66.01    Bipolar disorder, in partial remission, most recent episode depressed ([address] F31.75    Asthma due to environmental allergies J45.909       Past Medical History:   Diagnosis Date    Anemia     Asthma     10 yrs old    Attention deficit disorder with hyperactivity(314.01) 10/31/2013    Bipolar I disorder, most recent episode (or current) manic, unspecified 10/31/2013    [facility] Depression 9/25/13    bipolar disorder    Diabetes ([address]     Essential hypertension     with first pregnacy    Mood disorder ([address] 9/25/2013    Morbid obesity ([address]     PCOS (polycystic ovarian syndrome)     A1c 6.6 4/2015    Personality disorder ([address]      histrionic Antione Massed    Posttraumatic stress disorder 10/31/2013    Prediabetes 12/31/13    A1c 6.3 per lab   6.6 4/2015    Somatization disorder 10/31/2013    Unspecified personality disorder 10/31/2013    Unspecified sleep apnea     childhood --had T&A done         Past Surgical History:   Procedure Laterality Date    HX GYN  2013    BTL    HX GYN      D & C    HX HYSTERECTOMY  2/25/15    [facility] at [address]  2007         She has not had any admission for bipolar in 3 years.      Fela Iverson MD      Allergies   Allergen Reactions    Bactrim [Sulfamethoprim Ds] Hives    Tape [Adhesive] Rash         Family History   Problem Relation Age of Onset    Diabetes Maternal Grandmother     Lung Disease Maternal Grandfather     Cancer Paternal Grandmother         Pancreatic    Diabetes Father     Anesth Problems Neg Hx        Social History     Socioeconomic History    Marital status:      Spouse name: Not on file    Number of children: Not on file    Years of education: Not on file    Highest education level: Not on file   Occupational History    Not on file   Social Needs    Financial resource strain: Not on file    Food insecurity:     Worry: Not on file     Inability: Not on file    Transportation needs:     Medical: Not on file     Non-medical: Not on file   Tobacco Use    Smoking status: Never Smoker    Smokeless tobacco: Never Used   Substance and Sexual Activity    Alcohol use: No    Drug use: No    Sexual activity: Yes     Partners: Male     Birth control/protection: Condom, None   Lifestyle    Physical activity:     Days per week: Not on file     Minutes per session: Not on file    Stress: Not on file   Relationships    Social connections:     Talks on phone: Not on file     Gets together: Not on file     Attends Yazidism service: Not on file     Active member of club or organization: Not on file     Attends meetings of clubs or organizations: Not on file Relationship status: Not on file    Intimate partner violence:     Fear of current or ex partner: Not on file     Emotionally abused: Not on file     Physically abused: Not on file     Forced sexual activity: Not on file   Other Topics Concern    Not on file   Social History Narrative    Not on file     HPI    Review of Systems   Constitutional: Negative for chills, fever and malaise/fatigue. HENT: Negative for congestion, hearing loss, sinus pain, sore throat and tinnitus. Eyes: Negative for blurred vision, double vision, pain, discharge and redness. Respiratory: Negative for cough, sputum production, shortness of breath and wheezing. Cardiovascular: Negative for chest pain, palpitations and leg swelling. Gastrointestinal: Negative for abdominal pain, constipation, diarrhea, heartburn, nausea and vomiting. Genitourinary: Negative for dysuria, frequency and urgency. Musculoskeletal: Positive for back pain and joint pain (knees). Skin: Negative for itching and rash. ezcema   Neurological: Negative for dizziness, seizures and headaches. Psychiatric/Behavioral: Positive for depression (stable on medication, therapy). The patient does not have insomnia. Physical Exam   Constitutional: She is oriented to person, place, and time. She appears well-developed and well-nourished. Cardiovascular: Normal rate and regular rhythm. Exam reveals no gallop and no friction rub. No murmur heard. Pulmonary/Chest: Effort normal and breath sounds normal.   Abdominal: Soft. Bowel sounds are normal. She exhibits no distension. There is no tenderness. No masses or hernia noted. Neurological: She is alert and oriented to person, place, and time. Skin: Skin is warm and dry. Psychiatric: She has a normal mood and affect.        ASSESSMENT and PLAN      The procedure of divided gastric bypass with Javon-en-Y gastrojejunostomy was explained to the patient including the four parts of the operation- 1) loss of appetite, 2) the restrictive phases, 3) the dumping phase, 4) mild malabsorption from the diversion of pancreatic or biliary enzymes. Informed consent was obtained concerning the potential morbidities and mortality of the operation including anastomotic leak, pulmonary embolism, deep vein thrombosis, bleeding, staple- line disruption, stomal stenosis or dilatation, inadequate weight loss, and requirement for life-long vitamin intake. Further information was given concerning a three-day hospitalization with at least one or more nights in a special care unit, protein- enriched liquified diet for two-thee weeks, convalescence for three to six weeks. Information was provided concerning the team approach anesthesia, nursing, and dietary. Pneumatic stockings, Heparin or Lovenox, and wound care management techniques were explained. Abdominal pain, nausea and/or vomiting may occur if eating too fast, too much, or not chewing well enough. With sweets or high fat ingestion, dumping may occur. It was further stressed the approximately three to five percent of patients require endoscopic balloon dilatation of the staple line. Furthermore, a clear discussion of the imperfections of the operation was discussed including the fact that it not effective in every patient because of patient non-compliance and/or mechanical failure. It was hoped, however, that at approaching a ninety percent level, the patients can achieve a mean of seventy percent loss of excess body weight. This is determined partially by patient compliance and exercise as well as making wise choices for the diet. Patient meets criteria established by the NIH. Without weight reduction, co-morbidities will escalate as well as risk of early mortality. Recommendation is patient could be served with surgical weight reduction, the procedure of Gastric bypass.  I explained to the patient differences between laparoscopic and open gastric bypass, laparoscopic adjustable gastric banding, and sleeve gastrectomy procedures. Patient has attended one our informational meeting and has seen our educational materials. Patient desires to have surgery with Dr. Marcial Rodriguez. I have reinforced without lifestyle change and behavior modification, they would not achieve his weight loss goals. I reviewed risks and complications associated with each procedure. Other recommendations include psychological evaluation, nutritional consultation. I discussed with patient a diet high in protein, low-fat, low- sugar, limited carbohydrates, and discontinuing use of carbonated beverages. Also discussed physical activity and exercises. I have answered all questions, they wish to proceed. 30 minutes spent face to face with patient, >50% of time spent counseling.    ** Copy to PCP and other providers

## 2019-11-19 NOTE — PROGRESS NOTES
1. Have you been to the ER, urgent care clinic since your last visit? Hospitalized since your last visit? No    2. Have you seen or consulted any other health care providers outside of the [address] [place] [address] since your last visit? Include any pap smears or colon screening. No    Jana Randolph [facility]  Body composition    female  36 y.o. Vitals:    11/19/19 0955   Height: 5' 9\" (1.753 m)     Body mass index is 54.88 kg/m². Melvenia Shola Neck- 15in   Waist- 53in   Hips- 53in

## 2019-11-19 NOTE — PATIENT INSTRUCTIONS
Learning About How to Prepare for Weight-Loss Surgery  How can you prepare for weight-loss surgery? Having weight-loss surgery (also called bariatric surgery) is a big step. You can prepare for surgery by having a plan. Your plan may include your goals for losing weight and how to makes changes in your diet, activity, and lifestyle to help raise your chances of success. One way to prepare for surgery is to think about your goal or reason why you want to reach a healthy weight. Do you want to lower your blood pressure, cholesterol, or blood sugar? Do you want to be able to sleep better, play with your kids, or walk around the block? Having a reason can help you stay with your plan and meet your goals. Your weight-loss surgery team can help you meet your goals and get ready for surgery. Baylee Powers work with a team that's trained to help you lose weight and make healthy changes in your life. This team may include:  · A medical doctor or nurse to help manage your care and schedule tests before surgery. · A surgeon who specializes in weight-loss surgery. · A registered dietitian to help you plan meals and make changes in the way you eat. · An exercise specialist to help you be more active and get stronger. · A therapist or counselor to help you learn why you eat and teach you ways to deal with stress and your emotions. Your team will also be there to help you prepare for life after surgery. They will help you adjust to new ways of eating and changes to your body. How will weight-loss surgery affect your life? You have likely thought a lot about how surgery may affect your life--how you will eat, how your body will look, or how you will feel. Some people feel overwhelmed with these changes. But planning can help you prepare for the changes and meet your weight-loss goals. One important step in your plan is to learn about the ways surgery will affect your life. These may include:  · A slimmer you.  You probably will lose weight very quickly in the first few months after surgery. As time goes on, your weight loss will slow down. How much weight you lose depends on what type of surgery you had and how well your new eating and activity plans are working for you. · A new way of eating. Success in reaching and keeping a healthy weight depends on making lifelong changes in how you eat. After surgery, you raise your chances of success if you:  ? Eat just a few ounces of food at a time. ? Eat very slowly and chew your food to mush. ? Don't drink for 30 minutes before you eat, during your meal, and for 30 minutes after you eat. ? Are careful about drinking alcohol. ? Avoid foods that are high in fat or sugar. ? Take vitamin and mineral supplements. · A healthier you. Weight-loss surgery can have some real health benefits. Problems like diabetes, high blood pressure, and sleep apnea may go away--or at least become easier to manage. · A more active you. After surgery, being active on most days of the week will help you reach your weight goal and avoid gaining back the weight you lose. · A lot of extra skin. When you lose weight quickly, you may have a lot of extra skin. That's normal. You can have surgery to remove the extra skin if it bothers you. There are going to be some ups and downs while you get used to these changes. So another way to adjust is to identify who can help support you. Getting support from friends and family can help. And joining a support group for people who have had the surgery can be a big help too, because they know what you're going through. As you know, it's a big decision to have weight-loss surgery. But when you have a plan, you can focus on losing weight and living a healthier life. So what steps can you take to prepare for weight-loss surgery? Will you set some goals? Will you learn about how surgery can affect your life? How about asking family or friends for help?  Write out your plan. Then get ready. Where can you learn more? Go to http://seth-vidal.info/. Enter D019 in the search box to learn more about \"Learning About How to Prepare for Weight-Loss Surgery. \"  Current as of: March 28, 2019  Content Version: 12.2  © 5098-2654 [employer]. Care instructions adapted under license by [employer] (which disclaims liability or warranty for this information). If you have questions about a medical condition or this instruction, always ask your healthcare professional. [address] any warranty or liability for your use of this information.

## 2019-11-25 ENCOUNTER — OFFICE VISIT (OUTPATIENT)
Dept: PRIMARY CARE CLINIC | Age: 34
End: 2019-11-25

## 2019-11-25 VITALS
BODY MASS INDEX: 43.4 KG/M2 | OXYGEN SATURATION: 97 % | TEMPERATURE: 98.2 F | WEIGHT: 293 LBS | DIASTOLIC BLOOD PRESSURE: 73 MMHG | RESPIRATION RATE: 18 BRPM | HEART RATE: 98 BPM | SYSTOLIC BLOOD PRESSURE: 114 MMHG | HEIGHT: 69 IN

## 2019-11-25 DIAGNOSIS — J40 BRONCHITIS: Primary | ICD-10-CM

## 2019-11-25 DIAGNOSIS — J02.9 SORE THROAT: ICD-10-CM

## 2019-11-25 DIAGNOSIS — J45.909 ASTHMA DUE TO ENVIRONMENTAL ALLERGIES: ICD-10-CM

## 2019-11-25 LAB
S PYO AG THROAT QL: NEGATIVE
VALID INTERNAL CONTROL?: YES

## 2019-11-25 RX ORDER — AZITHROMYCIN 250 MG/1
TABLET, FILM COATED ORAL
Qty: 6 TAB | Refills: 0 | Status: SHIPPED | OUTPATIENT
Start: 2019-11-25 | End: 2019-12-16 | Stop reason: ALTCHOICE

## 2019-11-25 NOTE — PROGRESS NOTES
Mee Higginbotham is a 36 y.o. female    Chief Complaint   Patient presents with    Cold Symptoms     Since Friday evening 11/22/19, nonproductive cough, sore throat, headache, chest feels tight, has been wheezing. 1. Have you been to the ER, urgent care clinic since your last visit? Hospitalized since your last visit? No    2. Have you seen or consulted any other health care providers outside of the [address] [place] [address] Andrea since your last visit? Include any pap smears or colon screening.  No    ADL Assessment 8/7/2019   Feeding yourself No Help Needed   Getting from bed to chair No Help Needed   Getting dressed No Help Needed   Bathing or showering No Help Needed   Walk across the room (includes cane/walker) No Help Needed   Using the telphone No Help Needed   Taking your medications No Help Needed   Preparing meals No Help Needed   Managing money (expenses/bills) No Help Needed   Moderately strenuous housework (laundry) No Help Needed   Shopping for personal items (toiletries/medicines) No Help Needed   Shopping for groceries No Help Needed   Driving No Help Needed   Climbing a flight of stairs No Help Needed   Getting to places beyond walking distances No Help Needed        Health Maintenance Due   Topic Date Due    DTaP/Tdap/Td series (1 - Tdap) 05/27/1997    Influenza Age 5 to Adult  08/01/2019

## 2019-11-25 NOTE — PATIENT INSTRUCTIONS
Bronchitis: Care Instructions Your Care Instructions Bronchitis is inflammation of the bronchial tubes, which carry air to the lungs. The tubes swell and produce mucus, or phlegm. The mucus and inflamed bronchial tubes make you cough. You may have trouble breathing. Most cases of bronchitis are caused by viruses like those that cause colds. Antibiotics usually do not help and they may be harmful. Bronchitis usually develops rapidly and lasts about 2 to 3 weeks in otherwise healthy people. Follow-up care is a key part of your treatment and safety. Be sure to make and go to all appointments, and call your doctor if you are having problems. It's also a good idea to know your test results and keep a list of the medicines you take. How can you care for yourself at home? · Take all medicines exactly as prescribed. Call your doctor if you think you are having a problem with your medicine. · Get some extra rest. 
· Take an over-the-counter pain medicine, such as acetaminophen (Tylenol), ibuprofen (Advil, Motrin), or naproxen (Aleve) to reduce fever and relieve body aches. Read and follow all instructions on the label. · Do not take two or more pain medicines at the same time unless the doctor told you to. Many pain medicines have acetaminophen, which is Tylenol. Too much acetaminophen (Tylenol) can be harmful. · Take an over-the-counter cough medicine that contains dextromethorphan to help quiet a dry, hacking cough so that you can sleep. Avoid cough medicines that have more than one active ingredient. Read and follow all instructions on the label. · Breathe moist air from a humidifier, hot shower, or sink filled with hot water. The heat and moisture will thin mucus so you can cough it out. · Do not smoke. Smoking can make bronchitis worse. If you need help quitting, talk to your doctor about stop-smoking programs and medicines. These can increase your chances of quitting for good.  
When should you call for help? Call 911 anytime you think you may need emergency care. For example, call if: 
  · You have severe trouble breathing.  
 Call your doctor now or seek immediate medical care if: 
  · You have new or worse trouble breathing.  
  · You cough up dark brown or bloody mucus (sputum).  
  · You have a new or higher fever.  
  · You have a new rash.  
 Watch closely for changes in your health, and be sure to contact your doctor if: 
  · You cough more deeply or more often, especially if you notice more mucus or a change in the color of your mucus.  
  · You are not getting better as expected. Where can you learn more? Go to http://seth-vidal.info/. Enter H333 in the search box to learn more about \"Bronchitis: Care Instructions. \" Current as of: June 9, 2019 Content Version: 12.2 © 2515-4639 [employer]. Care instructions adapted under license by [employer] (which disclaims liability or warranty for this information). If you have questions about a medical condition or this instruction, always ask your healthcare professional. [address] any warranty or liability for your use of this information.

## 2019-11-25 NOTE — PROGRESS NOTES
HPI:     Chief Complaint   Patient presents with    Cold Symptoms     Since Friday evening 11/22/19, nonproductive cough, sore throat, headache, chest feels tight, has been wheezing. Patient is a 36 y.o. female who presents for evaluation of cold symptoms. Patient reports 3 day history of worsening nasal congestion, rhinorrhea, sore throat, hoarseness, mild headache, cough. Denies fever, chills. Cough is dry, nonproductive. Denies dyspnea, difficulty breathing, but states she has been wheezing intermittently. Hx of asthma triggered by environmental allergies. She follows with pulmonologist.  Dusty Soto and Xyzal regularly. Uses Flonase. Was recently started on [place]. Uses albuterol as needed, which she used last night with relief. Denies sick contacts. Has not tried any OTC treatment at home. Patient Active Problem List   Diagnosis Code    Posttraumatic stress disorder F43.10    Somatization disorder F45.0    Attention deficit disorder with hyperactivity(314.01) F90.9    Obesity, morbid, BMI 50 or higher ([facility] [place] [address] E66.01    Bipolar disorder, in partial remission, most recent episode depressed ([facility] [place] [address] F31.75    Asthma due to environmental allergies J45.909     Current Outpatient Medications   Medication Sig Dispense Refill    montelukast (SINGULAIR) 10 mg tablet TAKE 1 TABLET BY MOUTH ONCE DAILY  3    levocetirizine (XYZAL) 5 mg tablet TAKE 1 TABLET BY MOUTH ONCE DAILY  3    ferrous sulfate 325 mg (65 mg iron) tablet Take 1 Tab by mouth Daily (before breakfast) for 90 days. 90 Tab 0    spironolactone (ALDACTONE) 50 mg tablet Take 1 Tab by mouth daily for 90 days.  90 Tab 0    SAXENDA 3 mg/0.5 mL (18 mg/3 mL) pen WEEK 1 INJECT 0.6 MG SUBCUTANEOUSLY ONCE DAILY FOR 7 DAYS WEEK 2 INJECT 1.2 MG ONCE DAILY FOR 7 DAYS WEEK 3 INJECT 1.8 MG ONCE DAILY FOR 7 D  2    topiramate (TOPAMAX) 25 mg tablet TAKE 2 TABLETS BY MOUTH AT BEDTIME  2    metFORMIN (GLUCOPHAGE) 1,000 mg tablet Take 2,000 mg by mouth daily. Indications: disease of ovaries with cysts       Allergies   Allergen Reactions    Bactrim [Sulfamethoprim Ds] Hives    Tape [Adhesive] Rash     Past Medical History:   Diagnosis Date    Anemia     Asthma     10 yrs old    Attention deficit disorder with hyperactivity(314.01) 10/31/2013    Back pain     Bipolar I disorder, most recent episode (or current) manic, unspecified 10/31/2013    [facility]    Depression 9/25/13    bipolar disorder    Diabetes ([address]     Essential hypertension     with first pregnacy    Inability to control urination     Joint pain     Mood disorder ([address] 9/25/2013    Morbid obesity ([facility] [address]     Muscle pain     PCOS (polycystic ovarian syndrome)     A1c 6.6 4/2015    Personality disorder ([facility] [address]      histrionic              Orren Hayes    Posttraumatic stress disorder 10/31/2013    Prediabetes 12/31/13    A1c 6.3 per lab   6.6 4/2015    Sinus complaint     Skin disease     Frequent boils    Somatization disorder 10/31/2013    Unspecified personality disorder 10/31/2013    Unspecified sleep apnea     childhood --had T&A done    Urination frequency           ROS:   Pertinent items are noted in HPI. Objective:     Vitals:    11/25/19 1218   BP: 114/73   Pulse: 98   Resp: 18   Temp: 98.2 °F (36.8 °C)   TempSrc: Oral   SpO2: 97%   Weight: (!) 365 lb 12.8 oz (165.9 kg)   Height: 5' 9\" (1.753 m)        Vitals and Nurse Documentation reviewed.     Physical Examination:   General appearance - alert, well appearing, and in no distress  Mental status - alert, oriented to person, place, and time, normal mood, behavior, speech, dress, motor activity, and thought processes  Eyes - pupils equal and reactive, sclera white, conjunctiva pink  Ears - bilateral TM's and external ear canals normal  Nose - mucosal congestion, mucosal erythema, clear rhinorrhea; frontal sinus tenderness noted   Mouth - mucous membranes moist, pharynx normal without lesions  Neck - supple, no significant adenopathy  Chest - clear to auscultation, no wheezes, rales or rhonchi, symmetric air entry, no tachypnea, retractions or cyanosis  Heart - normal rate, regular rhythm, normal S1, S2, no murmurs, rubs, clicks or gallops  Neurological - alert, oriented, normal speech, no focal findings or movement disorder noted  Extremities - peripheral pulses normal, no pedal edema, no clubbing or cyanosis      Assessment/ Plan:   Diagnoses and all orders for this visit:    1. Bronchitis  -     Hx asthma. Lungs clear on exam, afebrile, O2 sats 97%. -     azithromycin (ZITHROMAX) 250 mg tablet; Take 2 tabs day 1, then 1 tab days 2-5. Medication benefits, risks, indication, dosage, potential adverse effects, and alternate medication options were discussed with patient who expressed understanding.   -     Advised albuterol every 6 hours as needed for wheezing or shortness of breath. -     Recommended throat lozenges, salt water gargles, cool mist humidifier, saline nasal spray or neti pot, warm moist compresses, increase fluid intake. -     Advised to follow-up if symptoms worsen or do not improve despite treatment; will consider CXR. 2. Sore throat  -     AMB POC RAPID STREP A is negative. 3. Asthma due to environmental allergies        -    Lungs clear on exam.  Follows with pulmonologist.  Continue current treatment plan and advised albuterol every 6 hours as needed for wheezing or shortness of breath. Follow-up and Dispositions    · Return if symptoms worsen or fail to improve. I have discussed the diagnosis with the patient and the intended plan as seen in the above orders. Advised prompt follow-up if symptoms worsen or fail to improve and symptoms that would warrant emergent evaluation in ED. The patient has received an after-visit summary and questions were answered concerning future plans.   I have discussed medication side effects and warnings with the patient as well. Patient expressed understanding and is in agreement with the diagnosis and plan.

## 2019-12-02 ENCOUNTER — CLINICAL SUPPORT (OUTPATIENT)
Dept: SURGERY | Age: 34
End: 2019-12-02

## 2019-12-02 VITALS — BODY MASS INDEX: 53.46 KG/M2 | WEIGHT: 293 LBS

## 2019-12-02 DIAGNOSIS — E66.01 MORBID OBESITY (HCC): Primary | ICD-10-CM

## 2019-12-02 NOTE — PROGRESS NOTES
Pre-operative Bariatric Nutrition Evaluation    Date: 2019   Sean Phillip M.D. Name: Jose Danielson  :  1985  Age:  36  Gender: Female   Type of Surgery: [x]           Gastric Bypass   []           Sleeve Gastrectomy    ASSESSMENT:    Past Medical History:HTN, Type II DM, sleep apnea, depression, anxiety, bipolar disorder, asthma      Medications/Supplements:   Prior to Admission medications    Medication Sig Start Date End Date Taking? Authorizing Provider   azithromycin (ZITHROMAX) 250 mg tablet Take 2 tabs day 1, then 1 tab days 2-5    Arely Self NP   topiramate (TOPAMAX) 25 mg tablet TAKE 2 TABLETS BY MOUTH AT BEDTIME 19   Provider, Historical   montelukast (SINGULAIR) 10 mg tablet TAKE 1 TABLET BY MOUTH ONCE DAILY 19   Provider, Historical   levocetirizine (XYZAL) 5 mg tablet TAKE 1 TABLET BY MOUTH ONCE DAILY 19   Provider, Historical   ferrous sulfate 325 mg (65 mg iron) tablet Take 1 Tab by mouth Daily (before breakfast) for 90 days. 19  Cecy Bowman MD   spironolactone (ALDACTONE) 50 mg tablet Take 1 Tab by mouth daily for 90 days. 19  Cecy Bowman MD   SAXENDA 3 mg/0.5 mL (18 mg/3 mL) pen WEEK 1 INJECT 0.6 MG SUBCUTANEOUSLY ONCE DAILY FOR 7 DAYS WEEK 2 INJECT 1.2 MG ONCE DAILY FOR 7 DAYS WEEK 3 INJECT 1.8 MG ONCE DAILY FOR 7 D 19   Provider, Historical   metFORMIN (GLUCOPHAGE) 1,000 mg tablet Take 2,000 mg by mouth daily. Indications: disease of ovaries with cysts    Provider, Historical       Food Allergies/Intolerances:lactose intolerant     Anthropometrics:    Ht:69\"   Wt: 362#    IBW: 145#    %IBW: 249%     BMI:53    Category: obesity III     Reported wt history: Pt presents today for pre-op nutrition evaluation for wt loss surgery. Reports lowest adult BW of 200# at age 26 and highest adult BW of 420# at age 29. Attributes wt gain over the years r/t pregnancy and physical inactivity.  Has attempted wt loss through various methods with most successful wt loss of 50# since starting Saxenda and working with PCP in February 2019. Has been unable to maintain long term or significant wt loss and is now seeking approval for weight loss surgery. Pt will need to complete 3 months of weight loss visits with PCP followed by 3 months with Dr. Allan Macias. Exercise/Physical Activity:walking daily at work; dancing for 30 minutes, 2 times per week, Nustep for 20 minutes, 2 times per week    Reported Diet History:Atkins, diet pills, liquid diet, exercise, physician prescribed diet     24 Hour Diet Recall  Breakfast  skips   Lunch  Salad, spaghetti, rotisserie chicken, tuna, spinach    Dinner  Tuna/chicken salad, baked chicken, grapes, broccoli    Snacks  Honey bun, grapes, peanuts    Beverages  Water, juice      A pre-op nutrition checklist was reviewed. Patient checked off 5 of 15 items. Environment/Psychosocial/Support:pt lists her mother as main support system and rates level of support a 7 out of 10. Pt works full time and does majority of grocery shopping for the household which consists of her mother and sons. States her mother does majority of cooking and uses high fat methods of cooking. NUTRITION DIAGNOSIS:  1. Self-monitoring deficit r/t previous lack of value for this change evidenced by pt skips meals. 2. Physical inactivity r/t current lack of motivation for this change evidenced by pt reports no current exercise regimen. Has a membership to [facility]. 3. Food and nutrition related knowledge deficit r/t lack of prior exposure to information evidenced by pt seeking guidance for reading food labels. NUTRITION INTERVENTION:  Pt educated on nutrition recommendations for weight loss surgery, specifically gastric bypass. Instructed on consuming 3 meals per day starting now.   Use the balanced plate method to plan meals, include 3 oz of lean source of protein, 1/2 cup whole grains, unlimited non-starchy vegetables, 1/2 cup fruit and 1 serving of low fat dairy. Utilize handouts listing healthy snack and meal ideas to limit restaurant meals. After surgery measure all meals to 1/2 cup. Each meal will contain a 1/4 cup lean protein and 1/4 cup fruit, non-starchy vegetable or starch (limiting to once per day). Aim for 60 g protein per day. Sip on 48-64 oz of sugar free, calorie free, non-carbonated beverages each day. Do not use a straw. Do not consume beverages 30 minutes before, during or 30 minutes after meals. Read all nutrition labels. Demonstrated and emphasized identifying serving size, total fat, sugar and protein content. Defined low fat as </= 3 g per serving. Discussed lean and extra lean sources of protein. Provided list of low fat cooking methods. Avoid foods with sugar listed in the first 3 ingredients and >/15 g sugar per serving. Excess sugar/fat intake may lead to dumping syndrome. Discussed signs and symptoms of dumping syndrome. Practice mindful eating habits; take small bites, chew thoroughly, avoid distractions, utilize hunger/fullness scale. Consume meals over 20-30 minutes. Attend Bariatric Support Group and increase physical activity (approved per MD) for long term weight maintenance. NUTRITION MONITORING AND EVALUATION:    The following goals were established with patient;  1. Work on eating breakfast daily. Can use a protein shake PRN. List of recommended shakes provided. 2. Resume exercise at [facility]. 3. Reading food labels for added sugar with goal of less than 10 grams of added sugar per serving to help prevent dumping syndrome after gastric bypass. 4. Follow up 1-2 nutrition classes while also working to complete other requirements for bariatric surgery. Specific tips and techniques to facilitate compliance with above recommendations were provided and discussed. Nutrition evaluation reveals important lifestyle changes are indicated.  Pt would benefit from attending an additional 1-2 nutrition classes while also working to complete other insurance requirements for bariatric surgery. Scheduled for nutrition class on 12/17/19. Will continue to assess. If further details are desired please feel free to contact me at [phone]. This phone number was also provided to the patient for any further questions or concerns.            Joel Gomez RD

## 2019-12-12 ENCOUNTER — CLINICAL SUPPORT (OUTPATIENT)
Dept: SURGERY | Age: 34
End: 2019-12-12

## 2019-12-12 VITALS — BODY MASS INDEX: 52.72 KG/M2 | WEIGHT: 293 LBS

## 2019-12-12 DIAGNOSIS — E66.01 MORBID OBESITY (HCC): Primary | ICD-10-CM

## 2019-12-12 NOTE — PROGRESS NOTES
[facility]  Supervised Weight Loss     Date:   2019    Patient's Name: Sophie Hoang  : 1985    Insurance:  [employer]            Surgery: Gastric Bypass   Surgeon:  Niharika Hawkins M.D. Height: 69\"  Weight:    357      Lbs. BMI: 52   Pounds Lost since last month: 5#               Pounds Gained since last month: 0    Starting Weight: 362#   Previous Months Weight: 362#  Overall Pounds Lost: 5#  Overall Pounds Gained: 0    Other Pertinent Information: Pt is completing today's nutrition class as a program requirement for nutrition education purposes. She is working on 3 months weight loss visits with PCP followed by 3 months of weight loss visits with Dr. Marco A Eckert. Pt has been encouraged to continue attendance at (free) monthly nutrition classes while working on insurance requirements for approval.     Smoking Status:  none  Alcohol Intake: none    I have reviewed with pt the guidelines of the supervised wt loss program.  Pt understands the expectations of some wt loss during the program and that wt gain could delay the process. I have also explained that classes need to be consecutive. Missing a class may result in starting over. Pt has received this information in writing. Changes that patient has made since last month include:  Drinking more water, smaller portions, not eating after 7:00 pm.      Eating Habits and Behaviors  General healthy eating guidelines were discussed. A nutrition lesson specific to the importance of protein intake after surgery was provided. We discussed food sources of protein, protein supplements and multiple reasons as to why protein is important after bariatric surgery. Pts were instructed to focus on including protein at every meal and practice eating protein first at the meal. Pts were encouraged to sample a protein shake for tolerance.  Patients were also instructed to use the balanced plate method for help with portion control and general healthy eating prior to surgery. We discussed measuring meals to 1/2 cup total per meal after surgery. Drinking only calorie-free, sugar-free and non-carbonated beverages. We discussed the importance of drinking 64 ounces of fluid per day to prevent dehydration post-operatively. Patient's current diet habits include: eating 1-2 meals per day. Snacking on chips, grapes, pretzels. Eating chips and pretzels 2 times per week. Does not report intake of sweets/desserts. Eating baked, grilled, broiled foods. Eating out is 3 times per month. Drinking 40 oz water. Denies emotional eating. Reports night eating, portion sizes, lack of activity are biggest barriers to wt loss. Physical Activity/Exercise  We talked about the importance of increasing daily physical activity and beginning to develop an exercise regimen/routine. We talked about exercise as being an important part of long term weight loss after surgery. Comments:  During class, I discussed with patient the importance of getting into an exercise routine. Pt is currently not exercising stating \"no energy\" for activity. Pt has been encouraged to consider short segments of walking spaced throughout the day. Behavior Modification       We talked about how to eat more mindfully. Tips and recommendations for how to make these changes were provided. Pt was encouraged to keep a food journal and record what they were taking in daily. Overall Assessment: Pt demonstrates small/appropriate lifestyle changes evidenced by reported changes and weight loss. Pt would benefit from continued changes including eating 3 meals a day and implementation of exercise. Pt has been encouraged to continue attendance at free monthly nutrition classes while working to complete other insurance requirements. Otherwise appears to be an appropriate candidate for surgery at this time. Patient-Set Goals:   1.  Nutrition - drink more water, eat breakfast   2. Exercise - 30 minutes daily, dance after work   3.  Behavior -journal     Pasha Dupont, RD  12/12/2019

## 2019-12-16 ENCOUNTER — OFFICE VISIT (OUTPATIENT)
Dept: PRIMARY CARE CLINIC | Age: 34
End: 2019-12-16

## 2019-12-16 VITALS
WEIGHT: 293 LBS | BODY MASS INDEX: 43.4 KG/M2 | OXYGEN SATURATION: 96 % | DIASTOLIC BLOOD PRESSURE: 67 MMHG | SYSTOLIC BLOOD PRESSURE: 110 MMHG | HEART RATE: 85 BPM | TEMPERATURE: 98.4 F | HEIGHT: 69 IN | RESPIRATION RATE: 20 BRPM

## 2019-12-16 DIAGNOSIS — Z23 NEED FOR DIPHTHERIA-TETANUS-PERTUSSIS (TDAP) VACCINE: ICD-10-CM

## 2019-12-16 DIAGNOSIS — E66.01 OBESITY, MORBID, BMI 50 OR HIGHER (HCC): ICD-10-CM

## 2019-12-16 DIAGNOSIS — D50.8 OTHER IRON DEFICIENCY ANEMIA: ICD-10-CM

## 2019-12-16 DIAGNOSIS — J45.909 ASTHMA DUE TO ENVIRONMENTAL ALLERGIES: ICD-10-CM

## 2019-12-16 DIAGNOSIS — L02.213 CUTANEOUS ABSCESS OF CHEST WALL: ICD-10-CM

## 2019-12-16 DIAGNOSIS — I10 ESSENTIAL HYPERTENSION: Primary | ICD-10-CM

## 2019-12-16 RX ORDER — DOXYCYCLINE 100 MG/1
100 CAPSULE ORAL 2 TIMES DAILY
COMMUNITY
End: 2020-01-15 | Stop reason: ALTCHOICE

## 2019-12-16 NOTE — PROGRESS NOTES
Written by Edinson Goldman, as dictated by Dr. Rosy Mccarty MD.    Gloria Kim is a 36 y.o. female. HPI  The patient presents today for follow up. She had a protein shake this morning, but is otherwise fasting. She has lost weight. She weighs 356 lbs today and weighed 362 lbs on 12/02/19. She is taking [place] daily, but is not taking Metformin. She notes that her appetite is under control in [place]. She has talked to Kayleen Orozco NP and Dr. Polina Medina (General Surgery) for bariatric gastric bypass surgery, and was made aware of the risks of the surgery. She was told that the gastric bypass was the fastest way to lose weight, which was the greatest draw for her. She had her nutrition assessment with nutritional classes. She has brought documentation for Physician Support for bariatric surgery. She is scheduled for psychological evaluation on 12/19/19. She started taking Doxycyline for two abscess on her R side of her chest and under her L breast. She notes that she had gone to the ER to have the abscess drained and  packed. The abscess is not draining anymore and is healing properly. She has been feeling better since her last visit, and reports the fatigue has improved. She is not taking iron tablets at this time due to being on doxycycline, but was taking it previously. She is not taking Topamax 25 mg consistently, as she does not get migraine headaches. She reports her Asthma is under control. Compliant on Singular 10 mg.    BP is good today in office. Compliant on Spironolactone 50 mg daily. She had her flu shot at her workplace on 10/15/19. She does not recall the last time she had the Tdap, but notes her son is 8 yo.       Patient Active Problem List   Diagnosis Code    Posttraumatic stress disorder F43.10    Somatization disorder F45.0    Attention deficit disorder with hyperactivity(314.01) F90.9    Obesity, morbid, BMI 50 or higher ([facility] E66.01    Bipolar disorder, in partial remission, most recent episode depressed ([facility] F31.75    Asthma due to environmental allergies J45.909        Current Outpatient Medications on File Prior to Visit   Medication Sig Dispense Refill    doxycycline (MONODOX) 100 mg capsule Take 100 mg by mouth two (2) times a day.  topiramate (TOPAMAX) 25 mg tablet TAKE 2 TABLETS BY MOUTH AT BEDTIME  2    montelukast (SINGULAIR) 10 mg tablet TAKE 1 TABLET BY MOUTH ONCE DAILY  3    levocetirizine (XYZAL) 5 mg tablet TAKE 1 TABLET BY MOUTH ONCE DAILY  3    spironolactone (ALDACTONE) 50 mg tablet Take 1 Tab by mouth daily for 90 days. 90 Tab 0    SAXENDA 3 mg/0.5 mL (18 mg/3 mL) pen WEEK 1 INJECT 0.6 MG SUBCUTANEOUSLY ONCE DAILY FOR 7 DAYS WEEK 2 INJECT 1.2 MG ONCE DAILY FOR 7 DAYS WEEK 3 INJECT 1.8 MG ONCE DAILY FOR 7 D  2    azithromycin (ZITHROMAX) 250 mg tablet Take 2 tabs day 1, then 1 tab days 2-5 6 Tab 0    ferrous sulfate 325 mg (65 mg iron) tablet Take 1 Tab by mouth Daily (before breakfast) for 90 days. 90 Tab 0    metFORMIN (GLUCOPHAGE) 1,000 mg tablet Take 2,000 mg by mouth daily. Indications: disease of ovaries with cysts       No current facility-administered medications on file prior to visit.         Allergies   Allergen Reactions    Bactrim [Sulfamethoprim Ds] Hives    Tape [Adhesive] Rash       Past Medical History:   Diagnosis Date    Anemia     Asthma     10 yrs old    Attention deficit disorder with hyperactivity(314.01) 10/31/2013    Back pain     Bipolar I disorder, most recent episode (or current) manic, unspecified 10/31/2013    [facility]    Depression 9/25/13    bipolar disorder    Diabetes ([facility]     Essential hypertension     with first pregnacy    Inability to control urination     Joint pain     Mood disorder ([facility] 9/25/2013    Morbid obesity ([facility]     Muscle pain     PCOS (polycystic ovarian syndrome)     A1c 6.6 4/2015    Personality disorder ([facility] histrionic              Raheem Koo    Posttraumatic stress disorder 10/31/2013    Prediabetes 12/31/13    A1c 6.3 per lab   6.6 4/2015    Sinus complaint     Skin disease     Frequent boils    Somatization disorder 10/31/2013    Unspecified personality disorder 10/31/2013    Unspecified sleep apnea     childhood --had T&A done    Urination frequency        Past Surgical History:   Procedure Laterality Date    HX GYN  2013    BTL    HX GYN      D & C    HX HYSTERECTOMY  2/25/15    [facility]  2007       Family History   Problem Relation Age of Onset    Diabetes Maternal Grandmother     Lung Disease Maternal Grandfather     Cancer Paternal Grandmother         Pancreatic    Diabetes Father     Hypertension Father     Anesth Problems Neg Hx        Social History     Socioeconomic History    Marital status:      Spouse name: Not on file    Number of children: Not on file    Years of education: Not on file    Highest education level: Not on file   Occupational History    Not on file   Social Needs    Financial resource strain: Not on file    Food insecurity:     Worry: Not on file     Inability: Not on file    Transportation needs:     Medical: Not on file     Non-medical: Not on file   Tobacco Use    Smoking status: Never Smoker    Smokeless tobacco: Never Used   Substance and Sexual Activity    Alcohol use: No    Drug use: No    Sexual activity: Yes     Partners: Male     Birth control/protection: Condom   Lifestyle    Physical activity:     Days per week: Not on file     Minutes per session: Not on file    Stress: Not on file   Relationships    Social connections:     Talks on phone: Not on file     Gets together: Not on file     Attends [employer] service: Not on file     Active member of club or organization: Not on file     Attends meetings of clubs or organizations: Not on file     Relationship status: Not on file    Intimate partner violence:     Fear of current or ex partner: Not on file     Emotionally abused: Not on file     Physically abused: Not on file     Forced sexual activity: Not on file   Other Topics Concern    Not on file   Social History Narrative    Not on file       Office Visit on 11/25/2019   Component Date Value Ref Range Status    VALID INTERNAL CONTROL POC 11/25/2019 Yes   Final    Group A Strep Ag 11/25/2019 Negative  Negative Final       Review of Systems   Constitutional: Positive for weight loss (intentional). Negative for malaise/fatigue. HENT: Negative for congestion and hearing loss. Eyes: Negative for blurred vision and photophobia. Respiratory: Negative for cough and shortness of breath. Cardiovascular: Negative for chest pain and leg swelling. Gastrointestinal: Negative for constipation, diarrhea and heartburn. Genitourinary: Negative for dysuria, frequency and urgency. Musculoskeletal: Negative for joint pain and myalgias. Skin:        + R chest wall abscess   Neurological: Negative for dizziness and headaches. Endo/Heme/Allergies: Positive for environmental allergies. Psychiatric/Behavioral: Negative for depression and substance abuse. The patient is not nervous/anxious and does not have insomnia. Visit Vitals  /67 (BP 1 Location: Left arm, BP Patient Position: Sitting)   Pulse 85   Temp 98.4 °F (36.9 °C) (Oral)   Resp 20   Ht 5' 9\" (1.753 m)   Wt (!) 356 lb (161.5 kg)   LMP 02/18/2015 Comment: bleeding consistently for 7 months now since 7/2014   SpO2 96%   BMI 52.57 kg/m²       Physical Exam  Vitals signs and nursing note reviewed. Constitutional:       General: She is not in acute distress. Appearance: Normal appearance. She is well-developed and well-groomed. She is morbidly obese. She is not diaphoretic. HENT:      Head: Normocephalic and atraumatic.       Right Ear: External ear normal.      Left Ear: External ear normal.   Eyes:      General:         Right eye: No discharge. Left eye: No discharge. Conjunctiva/sclera: Conjunctivae normal.      Pupils: Pupils are equal, round, and reactive to light. Neck:      Musculoskeletal: Normal range of motion and neck supple. Cardiovascular:      Rate and Rhythm: Normal rate and regular rhythm. Heart sounds: Normal heart sounds. No murmur. No friction rub. No gallop. Pulmonary:      Effort: Pulmonary effort is normal.      Breath sounds: Normal breath sounds. No wheezing. Abdominal:      General: Bowel sounds are normal.      Palpations: Abdomen is soft. Tenderness: There is no tenderness. Musculoskeletal: Normal range of motion. Skin:     Comments: R lower chest wall dressing clear , no oozing   Neurological:      Mental Status: She is alert and oriented to person, place, and time. Deep Tendon Reflexes: Reflexes are normal and symmetric. Psychiatric:         Behavior: Behavior normal.         Thought Content: Thought content normal.         ASSESSMENT and PLAN    ICD-10-CM ICD-9-CM    1. Essential hypertension Q48 459.0 METABOLIC PANEL, COMPREHENSIVE    CMP ordered. BP is well-controlled on current medication. No change to dosage at this time. 2. Obesity, morbid, BMI 50 or higher ([facility] E66.01 278.01 Discussed pt should call Bariatric Surgery to answer questions about what procedure will be done for the patient's best benefit. Discussed gastric sleeve is a better choice than the gastric bypass in terms of long term weight loss with less side effects. Patient should discuss the pros and cons for the different types of bariatric surgery. 3. Cutaneous abscess of chest wall L02.213 682. 2 Patient is taking Doxycycline. 4. Asthma due to environmental allergies J45.909 493.90 Well managed at this time with current medications. 5. Other iron deficiency anemia D50.8 280.8 CBC W/O DIFF    CBC ordered.    6. Need for diphtheria-tetanus-pertussis (Tdap) vaccine Z23 V06.1 diph,Kristofer,Shawn,,Tet Vac-PF (ADACEL) 2 Lf-(2.5-5-3-5 mcg)-5Lf/0.5 mL susp sent to pharmacy. Tdap prescribed. Potential side effects were discussed. Advised pt to call her OB/GYN to ask when she had her Tdap. Pt should return in 01/2020 and 02/2020 for follow-ups. This plan was reviewed with the patient and patient agrees. All questions were answered. This scribe documentation was reviewed by me and accurately reflects the examination and decisions made by me. This note will not be viewable in [address]

## 2019-12-16 NOTE — PROGRESS NOTES
Visit Vitals  /67 (BP 1 Location: Left arm, BP Patient Position: Sitting)   Pulse 85   Temp 98.4 °F (36.9 °C) (Oral)   Resp 20   Ht 5' 9\" (1.753 m)   Wt (!) 356 lb (161.5 kg)   SpO2 96%   BMI 52.57 kg/m²             Chief Complaint   Patient presents with    Follow Up Chronic Condition     Asthma, ADD, Obesity     Documentation     MD Support for Bariatric Surgery            HM Due Reviewed and WNL. 1. Have you been to the ER, urgent care clinic since your last visit? Hospitalized since your last visit? Last Monday, [facility] ED- R breast abscess. 2. Have you seen or consulted any other health care providers outside of the [address] [place] [address] since your last visit? Include any pap smears or colon screening.  Nutritionist, OB/GYN, Allergist, Dermatologist

## 2019-12-17 LAB
ALBUMIN SERPL-MCNC: 4.1 G/DL (ref 3.5–5.5)
ALBUMIN/GLOB SERPL: 1.5 {RATIO} (ref 1.2–2.2)
ALP SERPL-CCNC: 58 IU/L (ref 39–117)
ALT SERPL-CCNC: 9 IU/L (ref 0–32)
AST SERPL-CCNC: 12 IU/L (ref 0–40)
BILIRUB SERPL-MCNC: 0.2 MG/DL (ref 0–1.2)
BUN SERPL-MCNC: 12 MG/DL (ref 6–20)
BUN/CREAT SERPL: 15 (ref 9–23)
CALCIUM SERPL-MCNC: 9.2 MG/DL (ref 8.7–10.2)
CHLORIDE SERPL-SCNC: 98 MMOL/L (ref 96–106)
CO2 SERPL-SCNC: 27 MMOL/L (ref 20–29)
CREAT SERPL-MCNC: 0.79 MG/DL (ref 0.57–1)
ERYTHROCYTE [DISTWIDTH] IN BLOOD BY AUTOMATED COUNT: 15 % (ref 12.3–15.4)
GLOBULIN SER CALC-MCNC: 2.8 G/DL (ref 1.5–4.5)
GLUCOSE SERPL-MCNC: 77 MG/DL (ref 65–99)
HCT VFR BLD AUTO: 37.3 % (ref 34–46.6)
HGB BLD-MCNC: 11.2 G/DL (ref 11.1–15.9)
MCH RBC QN AUTO: 21.7 PG (ref 26.6–33)
MCHC RBC AUTO-ENTMCNC: 30 G/DL (ref 31.5–35.7)
MCV RBC AUTO: 72 FL (ref 79–97)
PLATELET # BLD AUTO: 385 X10E3/UL (ref 150–450)
POTASSIUM SERPL-SCNC: 4.3 MMOL/L (ref 3.5–5.2)
PROT SERPL-MCNC: 6.9 G/DL (ref 6–8.5)
RBC # BLD AUTO: 5.15 X10E6/UL (ref 3.77–5.28)
SODIUM SERPL-SCNC: 140 MMOL/L (ref 134–144)
WBC # BLD AUTO: 5.9 X10E3/UL (ref 3.4–10.8)

## 2019-12-20 NOTE — PROGRESS NOTES
Called and left a detailed message, lab letter sent. Encouraged to call with questions. End of encounter.

## 2019-12-31 ENCOUNTER — TELEPHONE (OUTPATIENT)
Dept: PRIMARY CARE CLINIC | Age: 34
End: 2019-12-31

## 2019-12-31 DIAGNOSIS — I10 ESSENTIAL HYPERTENSION: ICD-10-CM

## 2019-12-31 RX ORDER — SPIRONOLACTONE 25 MG/1
25 TABLET ORAL 2 TIMES DAILY
Qty: 180 TAB | Refills: 0 | Status: SHIPPED | OUTPATIENT
Start: 2019-12-31 | End: 2020-03-30

## 2020-01-15 ENCOUNTER — OFFICE VISIT (OUTPATIENT)
Dept: PRIMARY CARE CLINIC | Age: 35
End: 2020-01-15

## 2020-01-15 VITALS
SYSTOLIC BLOOD PRESSURE: 121 MMHG | TEMPERATURE: 98.1 F | HEIGHT: 69 IN | WEIGHT: 293 LBS | DIASTOLIC BLOOD PRESSURE: 81 MMHG | RESPIRATION RATE: 18 BRPM | OXYGEN SATURATION: 100 % | BODY MASS INDEX: 43.4 KG/M2 | HEART RATE: 81 BPM

## 2020-01-15 DIAGNOSIS — L73.2 HIDRADENITIS: Primary | ICD-10-CM

## 2020-01-15 DIAGNOSIS — I10 ESSENTIAL HYPERTENSION: ICD-10-CM

## 2020-01-15 DIAGNOSIS — E66.01 MORBIDLY OBESE (HCC): ICD-10-CM

## 2020-01-15 DIAGNOSIS — J45.909 ASTHMA DUE TO ENVIRONMENTAL ALLERGIES: ICD-10-CM

## 2020-01-15 DIAGNOSIS — F31.75 BIPOLAR DISORDER, IN PARTIAL REMISSION, MOST RECENT EPISODE DEPRESSED (HCC): ICD-10-CM

## 2020-01-15 RX ORDER — DOXYCYCLINE 100 MG/1
100 TABLET ORAL 2 TIMES DAILY
Qty: 20 TAB | Refills: 0 | Status: SHIPPED | OUTPATIENT
Start: 2020-01-15 | End: 2020-01-25

## 2020-01-15 RX ORDER — ALBUTEROL SULFATE 90 UG/1
1 AEROSOL, METERED RESPIRATORY (INHALATION)
Qty: 1 INHALER | Refills: 0 | Status: SHIPPED | OUTPATIENT
Start: 2020-01-15 | End: 2020-02-14

## 2020-01-15 NOTE — PROGRESS NOTES
Written by Darryn Addison, as dictated by Dr. Maureen Constantino MD.    Maranda Chandra is a 36 y.o. female. HPI  The patient presents today for weight management. She is currently at [facility] weight loss program for 6 months. She was previously at the [email or web address], but was having some issues in terms of scheduling and follow-up so decided to move to [facility]. She had her first appointment with [facility] yesterday. She was told she needs to do an endoscopy, and will wait on the results of the endoscopy before they decide of gastric sleeve or gastric bypass. She needs to lose 20 lbs more before she becomes a candidate for any surgery. She previously went to the ED for an abscess of her breast and was placed on Doxycyline which helped. The abscess has returned, and when she went to the ED, she was placed on Clindamycin topical cream, but cannot use it because it burns. She also notes the abscess continues to drain, and was told that it will continue to recur. She takes Trokendi 25 mg every so often, as she notes having increased fatigue. She tries to take Singular daily, and uses her albuterol inhaler as needed. BP in office looks good. Compliant on Spironolactone 25 mg. She is using Saxenda injections. Patient Active Problem List   Diagnosis Code    Posttraumatic stress disorder F43.10    Attention deficit disorder with hyperactivity(314.01) F90.9    Obesity, morbid, BMI 50 or higher ([facility] E66.01    Bipolar disorder, in partial remission, most recent episode depressed [facility] F31.75    Asthma due to environmental allergies J45.909        Current Outpatient Medications on File Prior to Visit   Medication Sig Dispense Refill    topiramate ER (TROKENDI XR) 25 mg capsule Take  by mouth daily.  spironolactone (ALDACTONE) 25 mg tablet Take 1 Tab by mouth two (2) times a day for 90 days.  180 Tab 0    montelukast (SINGULAIR) 10 mg tablet TAKE 1 TABLET BY MOUTH ONCE DAILY  3    levocetirizine (XYZAL) 5 mg tablet TAKE 1 TABLET BY MOUTH ONCE DAILY  3    SAXENDA 3 mg/0.5 mL (18 mg/3 mL) pen WEEK 1 INJECT 0.6 MG SUBCUTANEOUSLY ONCE DAILY FOR 7 DAYS WEEK 2 INJECT 1.2 MG ONCE DAILY FOR 7 DAYS WEEK 3 INJECT 1.8 MG ONCE DAILY FOR 7 D  2    doxycycline (MONODOX) 100 mg capsule Take 100 mg by mouth two (2) times a day. No current facility-administered medications on file prior to visit.         Allergies   Allergen Reactions    Bactrim [Sulfamethoprim Ds] Hives    Tape [Adhesive] Rash       Past Medical History:   Diagnosis Date    Anemia     Asthma     10 yrs old    Attention deficit disorder with hyperactivity(314.01) 10/31/2013    Back pain     Bipolar I disorder, most recent episode (or current) manic, unspecified 10/31/2013    [facility]    Depression 9/25/13    bipolar disorder    Diabetes ([address]     Essential hypertension     with first pregnacy    Inability to control urination     Joint pain     Mood disorder ([address] 9/25/2013    Morbid obesity ([address]     Muscle pain     PCOS (polycystic ovarian syndrome)     A1c 6.6 4/2015    Personality disorder ([address]      histrionic              Murtis Cedar    Posttraumatic stress disorder 10/31/2013    Prediabetes 12/31/13    A1c 6.3 per lab   6.6 4/2015    Sinus complaint     Skin disease     Frequent boils    Somatization disorder 10/31/2013    Unspecified personality disorder 10/31/2013    Unspecified sleep apnea     childhood --had T&A done    Urination frequency        Past Surgical History:   Procedure Laterality Date    HX GYN  2013    BTL    HX GYN      D & C    HX HYSTERECTOMY  2/25/15    [facility] at [facility] HX TONSILLECTOMY  2007       Family History   Problem Relation Age of Onset    Diabetes Maternal Grandmother     Lung Disease Maternal Grandfather     Cancer Paternal Grandmother         Pancreatic    Diabetes Father     Hypertension Father    Shana Bowens Problems Neg Hx        Social History     Socioeconomic History    Marital status:      Spouse name: Not on file    Number of children: Not on file    Years of education: Not on file    Highest education level: Not on file   Occupational History    Not on file   Social Needs    Financial resource strain: Not on file    Food insecurity:     Worry: Not on file     Inability: Not on file    Transportation needs:     Medical: Not on file     Non-medical: Not on file   Tobacco Use    Smoking status: Never Smoker    Smokeless tobacco: Never Used   Substance and Sexual Activity    Alcohol use: No    Drug use: No    Sexual activity: Yes     Partners: Male     Birth control/protection: Condom   Lifestyle    Physical activity:     Days per week: Not on file     Minutes per session: Not on file    Stress: Not on file   Relationships    Social connections:     Talks on phone: Not on file     Gets together: Not on file     Attends Rastafarian service: Not on file     Active member of club or organization: Not on file     Attends meetings of clubs or organizations: Not on file     Relationship status: Not on file    Intimate partner violence:     Fear of current or ex partner: Not on file     Emotionally abused: Not on file     Physically abused: Not on file     Forced sexual activity: Not on file   Other Topics Concern    Not on file   Social History Narrative    Not on file       Office Visit on 12/16/2019   Component Date Value Ref Range Status    Glucose 12/16/2019 77  65 - 99 mg/dL Final    BUN 12/16/2019 12  6 - 20 mg/dL Final    Creatinine 12/16/2019 0.79  0.57 - 1.00 mg/dL Final    GFR est non-AA 12/16/2019 99  >59 mL/min/1.73 Final    GFR est AA 12/16/2019 114  >59 mL/min/1.73 Final    BUN/Creatinine ratio 12/16/2019 15  9 - 23 Final    Sodium 12/16/2019 140  134 - 144 mmol/L Final    Potassium 12/16/2019 4.3  3.5 - 5.2 mmol/L Final    Chloride 12/16/2019 98  96 - 106 mmol/L Final    CO2 12/16/2019 27  20 - 29 mmol/L Final    Calcium 12/16/2019 9.2  8.7 - 10.2 mg/dL Final    Protein, total 12/16/2019 6.9  6.0 - 8.5 g/dL Final    Albumin 12/16/2019 4.1  3.5 - 5.5 g/dL Final    GLOBULIN, TOTAL 12/16/2019 2.8  1.5 - 4.5 g/dL Final    A-G Ratio 12/16/2019 1.5  1.2 - 2.2 Final    Bilirubin, total 12/16/2019 0.2  0.0 - 1.2 mg/dL Final    Alk. phosphatase 12/16/2019 58  39 - 117 IU/L Final    AST (SGOT) 12/16/2019 12  0 - 40 IU/L Final    ALT (SGPT) 12/16/2019 9  0 - 32 IU/L Final    WBC 12/16/2019 5.9  3.4 - 10.8 x10E3/uL Final    RBC 12/16/2019 5.15  3.77 - 5.28 x10E6/uL Final    HGB 12/16/2019 11.2  11.1 - 15.9 g/dL Final    HCT 12/16/2019 37.3  34.0 - 46.6 % Final    MCV 12/16/2019 72* 79 - 97 fL Final    MCH 12/16/2019 21.7* 26.6 - 33.0 pg Final    MCHC 12/16/2019 30.0* 31.5 - 35.7 g/dL Final    RDW 12/16/2019 15.0  12.3 - 15.4 % Final    PLATELET 80/97/4766 196  150 - 450 x10E3/uL Final   Office Visit on 11/25/2019   Component Date Value Ref Range Status    VALID INTERNAL CONTROL POC 11/25/2019 Yes   Final    Group A Strep Ag 11/25/2019 Negative  Negative Final       Review of Systems   Constitutional: Negative for malaise/fatigue and weight loss. HENT: Negative for congestion and hearing loss. Eyes: Negative for blurred vision and photophobia. Respiratory: Negative for cough and shortness of breath. Cardiovascular: Negative for chest pain and leg swelling. Gastrointestinal: Negative for constipation, diarrhea and heartburn. Genitourinary: Negative for dysuria, frequency and urgency. Musculoskeletal: Negative for joint pain and myalgias. Neurological: Negative for dizziness and headaches. Psychiatric/Behavioral: Negative for depression and substance abuse. The patient is not nervous/anxious and does not have insomnia.       Visit Vitals  /81 (BP 1 Location: Left arm, BP Patient Position: Sitting)   Pulse 81   Temp 98.1 °F (36.7 °C) (Oral)   Resp 18  5' 9\" (1.753 m)   Wt (!) 351 lb 12.8 oz (159.6 kg)   LMP 02/18/2015 Comment: bleeding consistently for 7 months now since 7/2014   SpO2 100%   BMI 51.95 kg/m²       Physical Exam  Vitals signs and nursing note reviewed. Constitutional:       General: She is not in acute distress. Appearance: Normal appearance. She is well-developed and well-groomed. She is morbidly obese. She is not diaphoretic. HENT:      Head: Normocephalic and atraumatic. Right Ear: External ear normal.      Left Ear: External ear normal.   Eyes:      General:         Right eye: No discharge. Left eye: No discharge. Conjunctiva/sclera: Conjunctivae normal.      Pupils: Pupils are equal, round, and reactive to light. Neck:      Musculoskeletal: Normal range of motion and neck supple. Cardiovascular:      Rate and Rhythm: Normal rate and regular rhythm. Heart sounds: Normal heart sounds. No murmur. No friction rub. No gallop. Pulmonary:      Effort: Pulmonary effort is normal.      Breath sounds: Normal breath sounds. No wheezing. Abdominal:      General: Bowel sounds are normal.      Palpations: Abdomen is soft. Tenderness: There is no tenderness. Musculoskeletal: Normal range of motion. Skin:     Comments: Deep seated inflamed nodules between the breast.  + purulent discharge. Neurological:      Mental Status: She is alert and oriented to person, place, and time. Deep Tendon Reflexes: Reflexes are normal and symmetric. Psychiatric:         Behavior: Behavior normal.         Thought Content: Thought content normal.         ASSESSMENT and PLAN    ICD-10-CM ICD-9-CM    1. Hidradenitis L73.2 705.83 doxycycline (ADOXA) 100 mg tablet sent to pharmacy. Doxycycline 100 mg prescribed. Potential side effects were discussed. Pt should take 1 tab BID x 10 days.    2. Asthma due to environmental allergies J45.909 493.90 albuterol (PROVENTIL HFA, VENTOLIN HFA, PROAIR HFA) 90 mcg/actuation inhaler sent to pharmacy. Albuterol inhaler refilled. 3. Morbidly obese ([facility] E66.01 278.01 Patient will be followed by [facility]. 4. Bipolar disorder, in partial remission, most recent episode depressed ([facility] F31.75 296.55 Reviewed and decided to continue present medications. 5. Essential hypertension I10 401.9 BP is well-controlled on current medication. No change to dosage at this time. This plan was reviewed with the patient and patient agrees. All questions were answered. This scribe documentation was reviewed by me and accurately reflects the examination and decisions made by me. This note will not be viewable in [address]

## 2020-01-15 NOTE — PROGRESS NOTES
Visit Vitals  /81 (BP 1 Location: Left arm, BP Patient Position: Sitting)   Pulse 81   Temp 98.1 °F (36.7 °C) (Oral)   Resp 18   Ht 5' 9\" (1.753 m)   Wt (!) 351 lb 12.8 oz (159.6 kg)   SpO2 100%   BMI 51.95 kg/m²             Chief Complaint   Patient presents with    Weight Management     Concerns w/ [address] wants to discuss     Skin Problem     Breast Abscess (in between her breast)              HM Due reviewed and WNL. 1. Have you been to the ER, urgent care clinic since your last visit? Hospitalized since your last visit? Denies     2. Have you seen or consulted any other health care providers outside of the [address] [place] [address] since your last visit? Include any pap smears or colon screening.  [facility]

## 2020-06-12 DIAGNOSIS — I10 ESSENTIAL HYPERTENSION: ICD-10-CM

## 2020-06-12 RX ORDER — SPIRONOLACTONE 50 MG/1
50 TABLET, FILM COATED ORAL DAILY
Qty: 90 TAB | Refills: 0 | Status: SHIPPED | OUTPATIENT
Start: 2020-06-12 | End: 2020-09-09 | Stop reason: ALTCHOICE

## 2020-07-07 ENCOUNTER — VIRTUAL VISIT (OUTPATIENT)
Dept: PRIMARY CARE CLINIC | Age: 35
End: 2020-07-07

## 2020-07-07 DIAGNOSIS — I10 ESSENTIAL HYPERTENSION: ICD-10-CM

## 2020-07-07 DIAGNOSIS — R42 DIZZINESS: Primary | ICD-10-CM

## 2020-07-07 DIAGNOSIS — E66.01 OBESITY, MORBID, BMI 50 OR HIGHER (HCC): ICD-10-CM

## 2020-07-07 RX ORDER — MECLIZINE HYDROCHLORIDE 25 MG/1
25 TABLET ORAL
Qty: 20 TAB | Refills: 0 | Status: SHIPPED | OUTPATIENT
Start: 2020-07-07 | End: 2020-07-17

## 2020-07-07 NOTE — PROGRESS NOTES
[facility]   [address], [address] [facility]  P: [phone]  F: [phone]    ISMAEL Hernandez is a 30 y.o. female who is seen over telehealth for Dizziness x 1 month. She is having spells with nausea, sweating, and finds that she has to lay down for resolution. The episodes are sudden and last 4-5 minutes. She denies any new medication or dietary changes except for Protonix 40 mg which she started taking last week for GERD symptom control. She continues on Spironolactone 50mg for hypertension. She reports BP has been fine at recent ortho visits for carpal tunnel pain. Review of records shows most recent BP is 121/100. She denies any chest pain, headache, or fatigue today. She is pursuing bariatric surgery with Dr Freddy Greene at [address] [place] [address]. She denies any ear or sinus pain/ pressure.        Patient Active Problem List    Diagnosis    Asthma due to environmental allergies    Bipolar disorder, in partial remission, most recent episode depressed ([address]    Obesity, morbid, BMI 50 or higher ([address]    Posttraumatic stress disorder      Past Medical History:   Diagnosis Date    Anemia     Asthma     10 yrs old    Attention deficit disorder with hyperactivity(314.01) 10/31/2013    Back pain     Bipolar I disorder, most recent episode (or current) manic, unspecified 10/31/2013    [facility] Depression 9/25/13    bipolar disorder    Diabetes ([address]     Essential hypertension     with first pregnacy    Inability to control urination     Joint pain     Mood disorder ([address] 9/25/2013    Morbid obesity ([address]     Muscle pain     PCOS (polycystic ovarian syndrome)     A1c 6.6 4/2015    Personality disorder ([address]      histrionic              Mardeen Dural    Posttraumatic stress disorder 10/31/2013    Prediabetes 12/31/13    A1c 6.3 per lab   6.6 4/2015    Sinus complaint     Skin disease     Frequent boils    Somatization disorder 10/31/2013    Unspecified personality disorder 10/31/2013    Unspecified sleep apnea     childhood --had T&A done    Urination frequency      Past Surgical History:   Procedure Laterality Date    HX GYN  2013    BTL    HX GYN      D & C    HX HYSTERECTOMY  2/25/15    [facility] [address] [facility]  2007     Social History     Socioeconomic History    Marital status:      Spouse name: Not on file    Number of children: Not on file    Years of education: Not on file    Highest education level: Not on file   Occupational History    Not on file   Social Needs    Financial resource strain: Not on file    Food insecurity     Worry: Not on file     Inability: Not on file    Transportation needs     Medical: Not on file     Non-medical: Not on file   Tobacco Use    Smoking status: Never Smoker    Smokeless tobacco: Never Used   Substance and Sexual Activity    Alcohol use: No    Drug use: No    Sexual activity: Yes     Partners: Male     Birth control/protection: Condom   Lifestyle    Physical activity     Days per week: Not on file     Minutes per session: Not on file    Stress: Not on file   Relationships    Social connections     Talks on phone: Not on file     Gets together: Not on file     Attends Hindu service: Not on file     Active member of club or organization: Not on file     Attends meetings of clubs or organizations: Not on file     Relationship status: Not on file    Intimate partner violence     Fear of current or ex partner: Not on file     Emotionally abused: Not on file     Physically abused: Not on file     Forced sexual activity: Not on file   Other Topics Concern    Not on file   Social History Narrative    Not on file     Family History   Problem Relation Age of Onset    Diabetes Maternal Grandmother     Lung Disease Maternal Grandfather     Cancer Paternal Grandmother         Pancreatic    Diabetes Father     Hypertension Father     Anesth Problems Neg Hx Allergies   Allergen Reactions    Bactrim [Sulfamethoprim Ds] Hives    Tape [Adhesive] Rash       Current Outpatient Medications   Medication Sig Dispense Refill    meclizine (ANTIVERT) 25 mg tablet Take 1 Tab by mouth three (3) times daily as needed for Dizziness for up to 10 days. 20 Tab 0    spironolactone (ALDACTONE) 50 mg tablet Take 1 Tab by mouth daily for 90 days. 90 Tab 0    topiramate ER (TROKENDI XR) 25 mg capsule Take  by mouth daily.  montelukast (SINGULAIR) 10 mg tablet TAKE 1 TABLET BY MOUTH ONCE DAILY  3    levocetirizine (XYZAL) 5 mg tablet TAKE 1 TABLET BY MOUTH ONCE DAILY  3    SAXENDA 3 mg/0.5 mL (18 mg/3 mL) pen WEEK 1 INJECT 0.6 MG SUBCUTANEOUSLY ONCE DAILY FOR 7 DAYS WEEK 2 INJECT 1.2 MG ONCE DAILY FOR 7 DAYS WEEK 3 INJECT 1.8 MG ONCE DAILY FOR 7 D  2           The medications were reviewed and updated in the medical record. The past medical history, past surgical history, and family history were reviewed and updated in the medical record. REVIEW OF SYSTEMS   Review of Systems   Constitutional: Negative for malaise/fatigue. HENT: Negative for congestion. Eyes: Negative for blurred vision and pain. Respiratory: Negative for cough and shortness of breath. Cardiovascular: Negative for chest pain and palpitations. Gastrointestinal: Negative for abdominal pain and heartburn. Genitourinary: Negative for frequency and urgency. Musculoskeletal: Negative for joint pain and myalgias. Neurological: Positive for dizziness. Negative for tingling, sensory change, weakness and headaches. Psychiatric/Behavioral: Negative for depression, memory loss and substance abuse. PHYSICAL EXAM   NO VITALS WERE TAKEN FOR THIS VISIT  Physical Exam  Constitutional:       Appearance: Normal appearance. She is morbidly obese. HENT:      Head: Normocephalic and atraumatic.       Right Ear: External ear normal.      Left Ear: External ear normal.   Eyes:      Conjunctiva/sclera: Conjunctivae normal.   Pulmonary:      Effort: Pulmonary effort is normal.   Neurological:      Mental Status: She is alert and oriented to person, place, and time. Mental status is at baseline. Psychiatric:         Speech: Speech normal.         Behavior: Behavior normal. Behavior is cooperative. Thought Content: Thought content normal.       ASSESSMENT/ PLAN   Diagnoses and all orders for this visit:    1. Dizziness  -     CBC W/O DIFF  -     METABOLIC PANEL, COMPREHENSIVE  -     TSH 3RD GENERATION  -     meclizine (ANTIVERT) 25 mg tablet; Take 1 Tab by mouth three (3) times daily as needed for Dizziness for up to 10 days. - Increase hydration. 2. Essential hypertension  -     CBC W/O DIFF  -     METABOLIC PANEL, COMPREHENSIVE  -     TSH 3RD GENERATION  - Discussed DBP was elevated at recent ortho visit. Monitor at home and submit BP readings to our office, before medication will be adjusted. 3. Obesity, morbid, BMI 50 or higher ([address]          - Pursuing bariatric surgery at [facility]. I was at home while conducting this encounter. Consent:  She and/or her healthcare decision maker is aware that this patient-initiated Telehealth encounter is a billable service, with coverage as determined by her insurance carrier. She is aware that she may receive a bill and has provided verbal consent to proceed: Yes    This virtual visit was conducted via [employer]. Pursuant to the emergency declaration under the [facility] waiver authority and the [employer] General Act, this Virtual  Visit was conducted to reduce the patient's risk of exposure to COVID-19 and provide continuity of care for an established patient. Services were provided through a video synchronous discussion virtually to substitute for in-person clinic visit.   Due to this being a TeleHealth evaluation, many elements of the physical examination are unable to be assessed. Total Time: minutes: 11-20 minutes. Disclaimer:  Advised patient to call back or return to office if symptoms worsen/change/persist.  Discussed expected course/resolution/complications of diagnosis in detail with patient. Medication risks/benefits/alternatives discussed with patient. Patient was given an after visit summary which includes diagnoses, current medications, & vitals. Discussed patient instructions and advised to read to all patient instructions regarding care. Patient expressed understanding with the diagnosis and plan. This note will not be viewable in [address]         Melanie Nick NP  7/7/2020        (This document has been electronically signed)

## 2020-07-09 LAB
ALBUMIN SERPL-MCNC: 4.2 G/DL (ref 3.8–4.8)
ALBUMIN/GLOB SERPL: 1.5 {RATIO} (ref 1.2–2.2)
ALP SERPL-CCNC: 59 IU/L (ref 39–117)
ALT SERPL-CCNC: 11 IU/L (ref 0–32)
AST SERPL-CCNC: 11 IU/L (ref 0–40)
BILIRUB SERPL-MCNC: <0.2 MG/DL (ref 0–1.2)
BUN SERPL-MCNC: 13 MG/DL (ref 6–20)
BUN/CREAT SERPL: 17 (ref 9–23)
CALCIUM SERPL-MCNC: 9.1 MG/DL (ref 8.7–10.2)
CHLORIDE SERPL-SCNC: 101 MMOL/L (ref 96–106)
CO2 SERPL-SCNC: 25 MMOL/L (ref 20–29)
CREAT SERPL-MCNC: 0.78 MG/DL (ref 0.57–1)
ERYTHROCYTE [DISTWIDTH] IN BLOOD BY AUTOMATED COUNT: 13.9 % (ref 11.7–15.4)
GLOBULIN SER CALC-MCNC: 2.8 G/DL (ref 1.5–4.5)
GLUCOSE SERPL-MCNC: 89 MG/DL (ref 65–99)
HCT VFR BLD AUTO: 37.4 % (ref 34–46.6)
HGB BLD-MCNC: 11.4 G/DL (ref 11.1–15.9)
MCH RBC QN AUTO: 22.2 PG (ref 26.6–33)
MCHC RBC AUTO-ENTMCNC: 30.5 G/DL (ref 31.5–35.7)
MCV RBC AUTO: 73 FL (ref 79–97)
PLATELET # BLD AUTO: 351 X10E3/UL (ref 150–450)
POTASSIUM SERPL-SCNC: 4.8 MMOL/L (ref 3.5–5.2)
PROT SERPL-MCNC: 7 G/DL (ref 6–8.5)
RBC # BLD AUTO: 5.13 X10E6/UL (ref 3.77–5.28)
SODIUM SERPL-SCNC: 140 MMOL/L (ref 134–144)
TSH SERPL DL<=0.005 MIU/L-ACNC: 1.34 UIU/ML (ref 0.45–4.5)
WBC # BLD AUTO: 6.4 X10E3/UL (ref 3.4–10.8)

## 2020-09-09 ENCOUNTER — OFFICE VISIT (OUTPATIENT)
Dept: PRIMARY CARE CLINIC | Age: 35
End: 2020-09-09
Payer: MEDICAID

## 2020-09-09 VITALS
HEART RATE: 75 BPM | HEIGHT: 69 IN | WEIGHT: 293 LBS | TEMPERATURE: 97.5 F | SYSTOLIC BLOOD PRESSURE: 133 MMHG | OXYGEN SATURATION: 100 % | BODY MASS INDEX: 43.4 KG/M2 | RESPIRATION RATE: 16 BRPM | DIASTOLIC BLOOD PRESSURE: 88 MMHG

## 2020-09-09 DIAGNOSIS — Z23 NEED FOR DIPHTHERIA-TETANUS-PERTUSSIS (TDAP) VACCINE: ICD-10-CM

## 2020-09-09 DIAGNOSIS — Z98.84 S/P GASTRIC BYPASS: ICD-10-CM

## 2020-09-09 DIAGNOSIS — K59.09 OTHER CONSTIPATION: ICD-10-CM

## 2020-09-09 DIAGNOSIS — E66.01 OBESITY, MORBID, BMI 50 OR HIGHER (HCC): ICD-10-CM

## 2020-09-09 DIAGNOSIS — J45.909 ASTHMA DUE TO ENVIRONMENTAL ALLERGIES: ICD-10-CM

## 2020-09-09 DIAGNOSIS — K21.9 GASTROESOPHAGEAL REFLUX DISEASE WITHOUT ESOPHAGITIS: Primary | ICD-10-CM

## 2020-09-09 DIAGNOSIS — Z23 ENCOUNTER FOR IMMUNIZATION: ICD-10-CM

## 2020-09-09 PROCEDURE — 90471 IMMUNIZATION ADMIN: CPT | Performed by: INTERNAL MEDICINE

## 2020-09-09 PROCEDURE — 99214 OFFICE O/P EST MOD 30 MIN: CPT | Performed by: INTERNAL MEDICINE

## 2020-09-09 PROCEDURE — 90686 IIV4 VACC NO PRSV 0.5 ML IM: CPT | Performed by: INTERNAL MEDICINE

## 2020-09-09 RX ORDER — PANTOPRAZOLE SODIUM 40 MG/1
GRANULE, DELAYED RELEASE ORAL
COMMUNITY
Start: 2020-09-01 | End: 2020-10-01

## 2020-09-09 RX ORDER — BUDESONIDE AND FORMOTEROL FUMARATE DIHYDRATE 160; 4.5 UG/1; UG/1
AEROSOL RESPIRATORY (INHALATION)
COMMUNITY
Start: 2020-08-31 | End: 2021-07-21 | Stop reason: ALTCHOICE

## 2020-09-09 NOTE — PROGRESS NOTES
Written by Dione Peres, as dictated by Dr. Omar Dailey MD.    Rom Owen is a 30 y.o. female. HPI   Pt presents today for a post-op follow up and needs routine blood work. She recently had gastric bypass surgery on 09/02/2020 and presents wearing a stability band around her abdomen. She has a follow-up on 09/15/2020. She states she is having difficulty maintaining her protein intake, as the protein shake causes nausea and she does not have an appetite. She is lactose intolerant and is unable to use protein powder. She is in contact with her surgeon to determine the best course of action. She states she lost about 18 lbs (from 361 to 349) due to the pre-op diet. She plans to return to seated work on 09/20/2020. She denies dizziness, HA. She has weaned off of pain medications. She complains of constipation and diarrhea. She is taking Colace. She discontinued spironolactone before the surgery. She has discontinued Saxenda and Topamax. She reports her asthma is stable. She is expecting a flare-up with the change in seasons. She follows with pulmonologist. She is taking Symbicort and Singulair. She reports the EGD found a hiatal hernia and GERD. She is compliant on pantoprozale with relief from GERD sxs.      Patient Active Problem List   Diagnosis Code    Posttraumatic stress disorder F43.10    Obesity, morbid, BMI 50 or higher ([facility] E66.01    Bipolar disorder, in partial remission, most recent episode depressed ([facility] F31.75    Asthma due to environmental allergies J45.909        Current Outpatient Medications on File Prior to Visit   Medication Sig Dispense Refill    budesonide-formoteroL (Symbicort) 160-4.5 mcg/actuation HFAA 2 PUFF, Inhalation, bid      pantoprazole (PROTONIX) 40 mg granules for oral suspension = 1 pkt each dose, PO, daily, can use omeprazole or esomeprazole 40mg once daily- cap to open in liquids, # 30 pkt, 0 Refills, Pharmacy: [facility]      montelukast (SINGULAIR) 10 mg tablet TAKE 1 TABLET BY MOUTH ONCE DAILY  3    [DISCONTINUED] spironolactone (ALDACTONE) 50 mg tablet Take 1 Tab by mouth daily for 90 days. 90 Tab 0    [DISCONTINUED] topiramate ER (TROKENDI XR) 25 mg capsule Take  by mouth daily.  levocetirizine (XYZAL) 5 mg tablet TAKE 1 TABLET BY MOUTH ONCE DAILY  3    [DISCONTINUED] SAXENDA 3 mg/0.5 mL (18 mg/3 mL) pen WEEK 1 INJECT 0.6 MG SUBCUTANEOUSLY ONCE DAILY FOR 7 DAYS WEEK 2 INJECT 1.2 MG ONCE DAILY FOR 7 DAYS WEEK 3 INJECT 1.8 MG ONCE DAILY FOR 7 D  2     No current facility-administered medications on file prior to visit.         Allergies   Allergen Reactions    Bactrim [Sulfamethoprim Ds] Hives    Tape [Adhesive] Rash       Past Medical History:   Diagnosis Date    Anemia     Asthma     10 yrs old    Attention deficit disorder with hyperactivity(314.01) 10/31/2013    Back pain     Bipolar I disorder, most recent episode (or current) manic, unspecified 10/31/2013    [facility]    Depression 9/25/13    bipolar disorder    Diabetes ([address]     Essential hypertension     with first pregnacy    Inability to control urination     Joint pain     Mood disorder ([address] 9/25/2013    Morbid obesity ([address]     Muscle pain     PCOS (polycystic ovarian syndrome)     A1c 6.6 4/2015    Personality disorder ([address]      histrionic              Abena Lopez    Posttraumatic stress disorder 10/31/2013    Prediabetes 12/31/13    A1c 6.3 per lab   6.6 4/2015    Sinus complaint     Skin disease     Frequent boils    Somatization disorder 10/31/2013    Unspecified personality disorder 10/31/2013    Unspecified sleep apnea     childhood --had T&A done    Urination frequency        Past Surgical History:   Procedure Laterality Date    HX GYN  2013    BTL    HX GYN      D & C    HX HYSTERECTOMY  2/25/15    [facility] at [address]  2007       Family History   Problem Relation Age of Onset    Diabetes Maternal Grandmother     Lung Disease Maternal Grandfather     Cancer Paternal Grandmother         Pancreatic    Diabetes Father     Hypertension Father     Anesth Problems Neg Hx        Social History     Socioeconomic History    Marital status:      Spouse name: Not on file    Number of children: Not on file    Years of education: Not on file    Highest education level: Not on file   Occupational History    Not on file   Social Needs    Financial resource strain: Not on file    Food insecurity     Worry: Not on file     Inability: Not on file   [employer] needs     Medical: Not on file     Non-medical: Not on file   Tobacco Use    Smoking status: Never Smoker    Smokeless tobacco: Never Used   Substance and Sexual Activity    Alcohol use: No    Drug use: No    Sexual activity: Yes     Partners: Male     Birth control/protection: Condom   Lifestyle    Physical activity     Days per week: Not on file     Minutes per session: Not on file    Stress: Not on file   Relationships    Social connections     Talks on phone: Not on file     Gets together: Not on file     Attends Taoist service: Not on file     Active member of club or organization: Not on file     Attends meetings of clubs or organizations: Not on file     Relationship status: Not on file    Intimate partner violence     Fear of current or ex partner: Not on file     Emotionally abused: Not on file     Physically abused: Not on file     Forced sexual activity: Not on file   Other Topics Concern    Not on file   Social History Narrative    Not on file       Virtual Visit on 07/07/2020   Component Date Value Ref Range Status    WBC 07/08/2020 6.4  3.4 - 10.8 x10E3/uL Final    RBC 07/08/2020 5.13  3.77 - 5.28 x10E6/uL Final    HGB 07/08/2020 11.4  11.1 - 15.9 g/dL Final    HCT 07/08/2020 37.4  34.0 - 46.6 % Final    MCV 07/08/2020 73* 79 - 97 fL Final    MCH 07/08/2020 22.2* 26.6 - 33.0 pg Final    MCHC 07/08/2020 30.5* 31.5 - 35.7 g/dL Final    RDW 07/08/2020 13.9  11.7 - 15.4 % Final    PLATELET 07/65/7301 866  150 - 450 x10E3/uL Final    Glucose 07/08/2020 89  65 - 99 mg/dL Final    BUN 07/08/2020 13  6 - 20 mg/dL Final    Creatinine 07/08/2020 0.78  0.57 - 1.00 mg/dL Final    GFR est non-AA 07/08/2020 99  >59 mL/min/1.73 Final    GFR est AA 07/08/2020 115  >59 mL/min/1.73 Final    BUN/Creatinine ratio 07/08/2020 17  9 - 23 Final    Sodium 07/08/2020 140  134 - 144 mmol/L Final    Potassium 07/08/2020 4.8  3.5 - 5.2 mmol/L Final    Chloride 07/08/2020 101  96 - 106 mmol/L Final    CO2 07/08/2020 25  20 - 29 mmol/L Final    Calcium 07/08/2020 9.1  8.7 - 10.2 mg/dL Final    Protein, total 07/08/2020 7.0  6.0 - 8.5 g/dL Final    Albumin 07/08/2020 4.2  3.8 - 4.8 g/dL Final    GLOBULIN, TOTAL 07/08/2020 2.8  1.5 - 4.5 g/dL Final    A-G Ratio 07/08/2020 1.5  1.2 - 2.2 Final    Bilirubin, total 07/08/2020 <0.2  0.0 - 1.2 mg/dL Final    Alk. phosphatase 07/08/2020 59  39 - 117 IU/L Final    AST (SGOT) 07/08/2020 11  0 - 40 IU/L Final    ALT (SGPT) 07/08/2020 11  0 - 32 IU/L Final    TSH 07/08/2020 1.340  0.450 - 4.500 uIU/mL Final       Review of Systems   Constitutional: Negative for malaise/fatigue and weight loss. HENT: Negative for congestion and sore throat. Eyes: Negative for blurred vision. Respiratory: Negative for cough and shortness of breath. Cardiovascular: Negative for chest pain and leg swelling. Gastrointestinal: Positive for constipation and diarrhea. Negative for heartburn. Genitourinary: Negative for frequency and urgency. Musculoskeletal: Negative for back pain, joint pain and myalgias. Neurological: Negative for dizziness and headaches. Psychiatric/Behavioral: Negative for depression. The patient is not nervous/anxious and does not have insomnia.       Visit Vitals  /88 (BP 1 Location: Left arm, BP Patient Position: Sitting) Pulse 75   Temp 97.5 °F (36.4 °C) (Temporal)   Resp 16   Ht 5' 9\" (1.753 m)   Wt 342 lb (155.1 kg)   LMP 02/18/2015 Comment: bleeding consistently for 7 months now since 7/2014   SpO2 100%   BMI 50.50 kg/m²       Physical Exam  Vitals signs and nursing note reviewed. Constitutional:       General: She is not in acute distress. Appearance: Normal appearance. She is well-developed and well-groomed. She is not diaphoretic. HENT:      Right Ear: External ear normal.      Left Ear: External ear normal.   Eyes:      General: No scleral icterus. Right eye: No discharge. Left eye: No discharge. Extraocular Movements: Extraocular movements intact. Neck:      Musculoskeletal: Normal range of motion and neck supple. Cardiovascular:      Rate and Rhythm: Normal rate and regular rhythm. Pulmonary:      Effort: Pulmonary effort is normal.      Breath sounds: Decreased breath sounds present. No wheezing. Abdominal:      General: A surgical scar is present. Comments: Surgical scar is healing well. Musculoskeletal:      Right lower leg: No edema. Left lower leg: No edema. Lymphadenopathy:      Cervical: No cervical adenopathy. Neurological:      Mental Status: She is alert and oriented to person, place, and time. Psychiatric:         Mood and Affect: Mood and affect normal.         Behavior: Behavior normal.         ASSESSMENT and PLAN    ICD-10-CM ICD-9-CM    1. Gastroesophageal reflux disease without esophagitis  K21.9 530.81 Stable at this time on pantoprozale. 2. S/P gastric bypass  Z98.84 V45.86 Pt is doing well s/p gastric bypass. She has a follow-up appointment on 9/15/2020.    3. Obesity, morbid, BMI 50 or higher ([facility]  E66.01 278.01 She has already lost some weight and is doing well post-surgery.  Since she already d/c spironolactone before her surgery I instructed her to stay off of it since her BP is good in office today without it and she will continue losing weight. 4. Asthma due to environmental allergies  J45.909 493.90 Stable at this time. Continue current regimen. 5. Encounter for immunization  Z23 V03.89 FLU (FLUAD QUAD INFLUENZA VACCINE,QUAD,ADJUVANTED) administered in office today. Pt tolerated well. 6. Need for diphtheria-tetanus-pertussis (Tdap) vaccine  Z23 V06.1 diphth,pertus,acell,,tetanus (BOOSTRIX TDAP) 2.5-8-5 Lf-mcg-Lf/0.5mL susp suspension. I sent the Rx to the pharmacy. Potential side effects were discussed. I prescribed the Tdap vaccine for health maintenance. 7. Other constipation  K59.09 564.09 I recommend continuing Colace and talking with her surgeon to determine a safe method of constipation relief. This plan was reviewed with the patient and patient agrees. All questions were answered. This scribe documentation was reviewed by me and accurately reflects the examination and decisions made by me. This note will not be viewable in [address]

## 2020-09-09 NOTE — PROGRESS NOTES
Chief Complaint   Patient presents with    Complete Physical     patient presents today for her annual physical and labs

## 2020-10-16 ENCOUNTER — TELEPHONE (OUTPATIENT)
Dept: PRIMARY CARE CLINIC | Age: 35
End: 2020-10-16

## 2020-10-16 NOTE — TELEPHONE ENCOUNTER
----- Message from Whit Shepard sent at 10/16/2020 12:09 PM EDT -----  Regarding: Dr. Luba Salazar first and last name: pt  Reason for call:  pt in [address] [place] [address] required yes/no and why: yes, to inform  Best contact number(s): [phone]  Details to clarify the request: pt admitted to [place] [address] on 10/15/20 and is still in the hospital and pt had endoscopy procedure but has not been discharged yet.

## 2020-10-16 NOTE — TELEPHONE ENCOUNTER
Returned call to patient.  She has been discharged and has appointment scheduled with dr Hervey Hamman

## 2020-10-23 ENCOUNTER — VIRTUAL VISIT (OUTPATIENT)
Dept: PRIMARY CARE CLINIC | Age: 35
End: 2020-10-23
Payer: MEDICAID

## 2020-10-23 DIAGNOSIS — E87.6 HYPOKALEMIA: ICD-10-CM

## 2020-10-23 DIAGNOSIS — K95.09 GASTRIC BAND MALFUNCTION: Primary | ICD-10-CM

## 2020-10-23 DIAGNOSIS — D50.8 IRON DEFICIENCY ANEMIA SECONDARY TO INADEQUATE DIETARY IRON INTAKE: ICD-10-CM

## 2020-10-23 DIAGNOSIS — E66.01 OBESITY, MORBID, BMI 50 OR HIGHER (HCC): ICD-10-CM

## 2020-10-23 PROCEDURE — 99213 OFFICE O/P EST LOW 20 MIN: CPT | Performed by: INTERNAL MEDICINE

## 2020-10-23 RX ORDER — FERROUS SULFATE 300 MG/5ML
1 LIQUID (ML) ORAL DAILY
Qty: 150 ML | Refills: 0 | Status: SHIPPED | OUTPATIENT
Start: 2020-10-23 | End: 2020-11-22

## 2020-10-23 NOTE — PROGRESS NOTES
Written by Brannon Henley, as dictated by Dr. Daisy Terrazas MD.    Uday Reyez is a 30 y.o. female. HPI  I was in the office while conducting this encounter. Consent:  She and/or her healthcare decision maker is aware that this patient-initiated Telehealth encounter is a billable service, with coverage as determined by her insurance carrier. She is aware that she may receive a bill and has provided verbal consent to proceed: Yes    This virtual visit was conducted via [address] Pursuant to the emergency declaration under the [facility] waiver authority and the [employer] Act, this Virtual  Visit was conducted to reduce the patient's risk of exposure to COVID-19 and provide continuity of care for an established patient. Services were provided through a video synchronous discussion virtually to substitute for in-person clinic visit. Due to this being a TeleHealth evaluation, many elements of the physical examination are unable to be assessed. Pt presents virtually to follow up on her recent ED visit. She was not feeling well on a few days ago and as having trouble keeping food down. She was experiencing vomiting frequently, and she went to the ED and found out that her esophagus was too tight and needed to be dilated more from her recent endoscopy. They dilated it in the hospital for her. Her iron was also low, and she was instructed to start taking an iron tablet. She was instructed to stay on a mostly soft and liquid diet, eating five small meals per day. She also had low potassium. She weighs 311 lb today and has lost significant weight. She feels better today, although she is still fatigued.      Patient Active Problem List   Diagnosis Code    Posttraumatic stress disorder F43.10    [facility], morbid, [facility] 50 or higher [facility] E66.01    Bipolar disorder, in partial remission, most recent episode depressed ([facility] F31.75    Asthma due to environmental allergies J45.909        Current Outpatient Medications on File Prior to Visit   Medication Sig Dispense Refill    budesonide-formoteroL (Symbicort) 160-4.5 mcg/actuation HFAA 2 PUFF, Inhalation, bid      montelukast (SINGULAIR) 10 mg tablet TAKE 1 TABLET BY MOUTH ONCE DAILY  3    levocetirizine (XYZAL) 5 mg tablet TAKE 1 TABLET BY MOUTH ONCE DAILY  3     No current facility-administered medications on file prior to visit.         Allergies   Allergen Reactions    Bactrim [Sulfamethoprim Ds] Hives    Tape [Adhesive] Rash       Past Medical History:   Diagnosis Date    Anemia     Asthma     10 yrs old    Attention deficit disorder with hyperactivity(314.01) 10/31/2013    Back pain     Bipolar I disorder, most recent episode (or current) manic, unspecified 10/31/2013    [facility]    Depression 9/25/13    bipolar disorder    Diabetes ([facility]     Essential hypertension     with first pregnacy    Inability to control urination     Joint pain     Mood disorder ([facility] 9/25/2013    Morbid obesity ([facility]     Muscle pain     PCOS (polycystic ovarian syndrome)     A1c 6.6 4/2015    Personality disorder ([facility]      histrionic              Divya Juan    Posttraumatic stress disorder 10/31/2013    Prediabetes 12/31/13    A1c 6.3 per lab   6.6 4/2015    Sinus complaint     Skin disease     Frequent boils    Somatization disorder 10/31/2013    Unspecified personality disorder 10/31/2013    Unspecified sleep apnea     childhood --had T&A done    Urination frequency        Past Surgical History:   Procedure Laterality Date    HX GYN  2013    BTL    HX GYN      D & C    HX HYSTERECTOMY  2/25/15    [facility] at [facility] HX TONSILLECTOMY  2007       Family History   Problem Relation Age of Onset    Diabetes Maternal Grandmother     Lung Disease Maternal Grandfather     Cancer Paternal Grandmother         Pancreatic    Diabetes Father     Hypertension Father     Anesth Problems Neg Hx        Social History     Socioeconomic History    Marital status:      Spouse name: Not on file    Number of children: Not on file    Years of education: Not on file    Highest education level: Not on file   Occupational History    Not on file   Social Needs    Financial resource strain: Not on file    Food insecurity     Worry: Not on file     Inability: Not on file    Transportation needs     Medical: Not on file     Non-medical: Not on file   Tobacco Use    Smoking status: Never Smoker    Smokeless tobacco: Never Used   Substance and Sexual Activity    Alcohol use: No    Drug use: No    Sexual activity: Yes     Partners: Male     Birth control/protection: Condom   Lifestyle    Physical activity     Days per week: Not on file     Minutes per session: Not on file    Stress: Not on file   Relationships    Social connections     Talks on phone: Not on file     Gets together: Not on file     Attends Ep[employer]opalian service: Not on file     Active member of club or organization: Not on file     Attends meetings of clubs or organizations: Not on file     Relationship status: Not on file    Intimate partner violence     Fear of current or ex partner: Not on file     Emotionally abused: Not on file     Physically abused: Not on file     Forced sexual activity: Not on file   Other Topics Concern    Not on file   Social History Narrative    Not on file       No visits with results within 3 Month(s) from this visit.    Latest known visit with results is:   Virtual Visit on 07/07/2020   Component Date Value Ref Range Status    WBC 07/08/2020 6.4  3.4 - 10.8 x10E3/uL Final    RBC 07/08/2020 5.13  3.77 - 5.28 x10E6/uL Final    HGB 07/08/2020 11.4  11.1 - 15.9 g/dL Final    HCT 07/08/2020 37.4  34.0 - 46.6 % Final    MCV 07/08/2020 73* 79 - 97 fL Final    MCH 07/08/2020 22.2* 26.6 - 33.0 pg Final    MCHC 07/08/2020 30.5* 31.5 - 35.7 g/dL Final    RDW 07/08/2020 13.9  11.7 - 15.4 % Final    PLATELET 22/90/8173 698  150 - 450 x10E3/uL Final    Glucose 07/08/2020 89  65 - 99 mg/dL Final    BUN 07/08/2020 13  6 - 20 mg/dL Final    Creatinine 07/08/2020 0.78  0.57 - 1.00 mg/dL Final    GFR est non-AA 07/08/2020 99  >59 mL/min/1.73 Final    GFR est AA 07/08/2020 115  >59 mL/min/1.73 Final    BUN/Creatinine ratio 07/08/2020 17  9 - 23 Final    Sodium 07/08/2020 140  134 - 144 mmol/L Final    Potassium 07/08/2020 4.8  3.5 - 5.2 mmol/L Final    Chloride 07/08/2020 101  96 - 106 mmol/L Final    CO2 07/08/2020 25  20 - 29 mmol/L Final    Calcium 07/08/2020 9.1  8.7 - 10.2 mg/dL Final    Protein, total 07/08/2020 7.0  6.0 - 8.5 g/dL Final    Albumin 07/08/2020 4.2  3.8 - 4.8 g/dL Final    GLOBULIN, TOTAL 07/08/2020 2.8  1.5 - 4.5 g/dL Final    A-G Ratio 07/08/2020 1.5  1.2 - 2.2 Final    Bilirubin, total 07/08/2020 <0.2  0.0 - 1.2 mg/dL Final    Alk. phosphatase 07/08/2020 59  39 - 117 IU/L Final    AST (SGOT) 07/08/2020 11  0 - 40 IU/L Final    ALT (SGPT) 07/08/2020 11  0 - 32 IU/L Final    TSH 07/08/2020 1.340  0.450 - 4.500 uIU/mL Final     Review of Systems   Constitutional: Positive for malaise/fatigue. Negative for weight loss. HENT: Negative for congestion and sore throat. Eyes: Negative for blurred vision. Respiratory: Negative for cough and shortness of breath. Cardiovascular: Negative for chest pain and leg swelling. Gastrointestinal: Negative for constipation and heartburn. Genitourinary: Negative for frequency and urgency. Musculoskeletal: Negative for back pain, joint pain and myalgias. Neurological: Negative for dizziness and headaches. Psychiatric/Behavioral: Negative for depression. The patient is not nervous/anxious and does not have insomnia.       Visit Vitals  LMP 02/18/2015 Comment: bleeding consistently for 7 months now since 7/2014     Physical Exam  Nursing note reviewed. Constitutional:       Appearance: Normal appearance. She is well-developed and well-groomed. HENT:      Head: Normocephalic and atraumatic. Nose: No congestion. Eyes:      General:         Right eye: No discharge. Left eye: No discharge. Pulmonary:      Effort: Pulmonary effort is normal.      Breath sounds: No wheezing. Neurological:      Mental Status: She is alert and oriented to person, place, and time. Psychiatric:         Mood and Affect: Mood normal.         Behavior: Behavior normal.       ASSESSMENT and PLAN    ICD-10-CM ICD-9-CM    1. Gastric band malfunction  K95.09 996.59 She is recovering well at home and is on a soft and liquid diet. Band was fixed. 2. Iron deficiency anemia secondary to inadequate dietary iron intake  D50.8 280.1 ferrous sulfate 300 mg (60 mg iron)/5 mL syrup sent to pharmacy. Potential side effects were discussed. I prescribed iron syrup and instructed her to take 5 ml every day along with some form of vitamin C such as oranges, orange juice, or a vitamin C supplement. 3. Hypokalemia  E87.6 276.8 I instructed her to eat bananas and kiwis to bring up her potassium levels. This plan was reviewed with the patient and patient agrees. All questions were answered. This scribe documentation was reviewed by me and accurately reflects the examination and decisions made by me.

## 2021-07-21 ENCOUNTER — OFFICE VISIT (OUTPATIENT)
Dept: PRIMARY CARE CLINIC | Age: 36
End: 2021-07-21
Payer: MEDICAID

## 2021-07-21 VITALS
RESPIRATION RATE: 15 BRPM | WEIGHT: 255 LBS | BODY MASS INDEX: 37.77 KG/M2 | HEART RATE: 73 BPM | TEMPERATURE: 97.8 F | OXYGEN SATURATION: 98 % | DIASTOLIC BLOOD PRESSURE: 85 MMHG | HEIGHT: 69 IN | SYSTOLIC BLOOD PRESSURE: 128 MMHG

## 2021-07-21 DIAGNOSIS — R25.1 SHAKINESS: ICD-10-CM

## 2021-07-21 DIAGNOSIS — E55.9 VITAMIN D DEFICIENCY: ICD-10-CM

## 2021-07-21 DIAGNOSIS — E66.9 OBESITY (BMI 30-39.9): ICD-10-CM

## 2021-07-21 DIAGNOSIS — J45.909 ASTHMA DUE TO ENVIRONMENTAL ALLERGIES: ICD-10-CM

## 2021-07-21 DIAGNOSIS — Z11.59 NEED FOR HEPATITIS C SCREENING TEST: ICD-10-CM

## 2021-07-21 DIAGNOSIS — R29.898 WEAKNESS OF BOTH LEGS: Primary | ICD-10-CM

## 2021-07-21 DIAGNOSIS — Z98.890 S/P GASTRIC SURGERY: ICD-10-CM

## 2021-07-21 DIAGNOSIS — F31.75 BIPOLAR DISORDER, IN PARTIAL REMISSION, MOST RECENT EPISODE DEPRESSED (HCC): ICD-10-CM

## 2021-07-21 PROCEDURE — 99214 OFFICE O/P EST MOD 30 MIN: CPT | Performed by: INTERNAL MEDICINE

## 2021-07-21 RX ORDER — ARIPIPRAZOLE 400 MG
300 KIT INTRAMUSCULAR
COMMUNITY
Start: 2020-10-16

## 2021-07-21 RX ORDER — ALBUTEROL SULFATE 90 UG/1
1 AEROSOL, METERED RESPIRATORY (INHALATION)
COMMUNITY
Start: 2020-08-31 | End: 2022-04-04 | Stop reason: SDUPTHER

## 2021-07-21 NOTE — PROGRESS NOTES
Chief Complaint   Patient presents with    Extremity Weakness     legs have been shaking, knees have been giving out- thinks it could be related to injection medication       Visit Vitals  /85 (BP 1 Location: Left arm)   Pulse 73   Temp 97.8 °F (36.6 °C)   Resp 15   Ht 5' 9\" (1.753 m)   Wt 255 lb (115.7 kg)   LMP 02/18/2015 Comment: bleeding consistently for 7 months now since 7/2014   SpO2 98%   BMI 37.66 kg/m²       1. Have you been to the ER, urgent care clinic since your last visit? Hospitalized since your last visit? No    2. Have you seen or consulted any other health care providers outside of the [address] [place] [address] since your last visit? Include any pap smears or colon screening.  Yes [facility]- bariatric surgery

## 2021-07-21 NOTE — PROGRESS NOTES
Jose Maria Lopez (: 1985) is a 29 y.o. female, established patient, here for evaluation of the following chief complaint(s):  Extremity Weakness (legs have been shaking, knees have been giving out- thinks it could be related to injection medication)     Written by Lory Lund, as dictated by Dr. Igor Dyson MD.      ASSESSMENT/PLAN:  Below is the assessment and plan developed based on review of pertinent history, physical exam, labs, studies, and medications. 1. Weakness of both legs  Likely secondary to vitamin deficiency. Ordered lab work to check CBC levels and metabolic panel. Waiting for results. -     CBC W/O DIFF; Future  -     METABOLIC PANEL, COMPREHENSIVE; Future    2. Shakiness  Recommended staying hydrated. Will check vitamins. 3. Bipolar disorder, in partial remission, most recent episode depressed ([facility]  Continue receiving Abilify 400 mg injections as prescribed. 4. S/P gastric surgery  Followed by Bariatric Sx. Ordered lab work to check Vitamin B12 levels. Waiting for results. If labs show Vitamin B12 deficiency, recommend that patient take a Vitamin B12 supplement daily. -     VITAMIN B12; Future    5. Need for hepatitis C screening test  Ordered Hepatitis C test. Waiting for results.  -     HEPATITIS C AB; Future    6. Obesity (BMI 30-39. 9)  I commended the pt on her healthy lifestyle choices and routines. Explained that 5,000 steps are needed daily for healthy lifestyle and to maintain conditioning, and she will need to increase to 10,000 a day to work on weight loss. 7. Vitamin D deficiency  Ordered lab work to check Vitamin D levels. Waiting for results. If lab reveal Vitamin D deficiency,  recommend that patient take a Vitamin D supplement of 1,000 units daily. -     VITAMIN D, 25 HYDROXY; Future    8. Asthma due to environmental allergies  Continue using Albuterol inhaler as prescribed.      SUBJECTIVE/OBJECTIVE:  HPI     Patient presents today c/o weakness and shakiness in her BL knees. She notes that when she is walking her knees feel like they are giving out and that she is going to fall. She denies any pain or SOB. She receives Abilify injections every 3 weeks for bipolar. She s/p for a gastric bypass on August 31st. She is followed by Dr. Cheryl Bacon, gastroenterology. She does not take Vitamin B12 or Vitamin D. She states she does not take any vitamins because she does not like the taste. She uses her Albuterol inhaler prn. Patient Active Problem List   Diagnosis Code    Posttraumatic stress disorder F43.10    Bipolar disorder, in partial remission, most recent episode depressed ([facility] F31.75    Asthma due to environmental allergies J45.909        Current Outpatient Medications on File Prior to Visit   Medication Sig Dispense Refill    ARIPiprazole (Abilify Maintena) 400 mg sers 400 mg by IntraMUSCular route.  albuterol (PROVENTIL HFA, VENTOLIN HFA, PROAIR HFA) 90 mcg/actuation inhaler 1 Puff.  [DISCONTINUED] budesonide-formoteroL (Symbicort) 160-4.5 mcg/actuation HFAA 2 PUFF, Inhalation, bid (Patient not taking: Reported on 7/21/2021)      [DISCONTINUED] montelukast (SINGULAIR) 10 mg tablet TAKE 1 TABLET BY MOUTH ONCE DAILY (Patient not taking: Reported on 7/21/2021)  3    [DISCONTINUED] levocetirizine (XYZAL) 5 mg tablet TAKE 1 TABLET BY MOUTH ONCE DAILY (Patient not taking: Reported on 7/21/2021)  3     No current facility-administered medications on file prior to visit.        Allergies   Allergen Reactions    Bactrim [Sulfamethoprim Ds] Hives    Tape [Adhesive] Rash       Past Medical History:   Diagnosis Date    Anemia     Asthma     10 yrs old    Attention deficit disorder with hyperactivity(314.01) 10/31/2013    Back pain     Bipolar I disorder, most recent episode (or current) manic, unspecified 10/31/2013    [facility]    Depression 9/25/13    bipolar disorder    [facility]     Essential hypertension     with first pregnacy    Inability to control urination     Joint pain     Mood disorder ([facility] 9/25/2013    Morbid obesity (HCC)     Muscle pain     PCOS (polycystic ovarian syndrome)     A1c 6.6 4/2015    Personality disorder ([facility]      histrionic              Suzy Alisia    Posttraumatic stress disorder 10/31/2013    Prediabetes 12/31/13    A1c 6.3 per lab   6.6 4/2015    Sinus complaint     Skin disease     Frequent boils    Somatization disorder 10/31/2013    Unspecified personality disorder 10/31/2013    Unspecified sleep apnea     childhood --had T&A done    Urination frequency        Past Surgical History:   Procedure Laterality Date    HX GYN  2013    BTL    HX GYN      D & C    HX HYSTERECTOMY  2/25/15    [facility] at [address] [facility] TONSILLECTOMY  2007       Family History   Problem Relation Age of Onset    Diabetes Maternal Grandmother     Lung Disease Maternal Grandfather     Cancer Paternal Grandmother         Pancreatic    Diabetes Father     Hypertension Father     Anesth Problems Neg Hx        Social History     Socioeconomic History    Marital status:      Spouse name: Not on file    Number of children: Not on file    Years of education: Not on file    Highest education level: Not on file   Occupational History    Not on file   Tobacco Use    Smoking status: Never Smoker    Smokeless tobacco: Never Used   Vaping Use    Vaping Use: Never used   Substance and Sexual Activity    Alcohol use: No    Drug use: No    Sexual activity: Yes     Partners: Male     Birth control/protection: Condom   Other Topics Concern    Not on file   Social History Narrative    Not on file     Social Determinants of Health     Financial Resource Strain:     Difficulty of Paying Living Expenses:    Food Insecurity:     Worried About Running Out of Food in the Last Year:     Ran Out of Food in the Last Year:    Transportation Needs:     Lack of Transportation (Medical):  Lack of Transportation (Non-Medical):    Physical Activity:     Days of Exercise per Week:     Minutes of Exercise per Session:    Stress:     Feeling of Stress :    Social Connections:     Frequency of Communication with Friends and Family:     Frequency of Social Gatherings with Friends and Family:     Attends Anabaptist Services:     Active Member of Clubs or Organizations:     Attends Club or Organization Meetings:     Marital Status:    Intimate Partner Violence:     Fear of Current or Ex-Partner:     Emotionally Abused:     Physically Abused:     Sexually Abused:        No visits with results within 3 Month(s) from this visit.    Latest known visit with results is:   Virtual Visit on 07/07/2020   Component Date Value Ref Range Status    WBC 07/08/2020 6.4  3.4 - 10.8 x10E3/uL Final    RBC 07/08/2020 5.13  3.77 - 5.28 x10E6/uL Final    HGB 07/08/2020 11.4  11.1 - 15.9 g/dL Final    HCT 07/08/2020 37.4  34.0 - 46.6 % Final    MCV 07/08/2020 73* 79 - 97 fL Final    MCH 07/08/2020 22.2* 26.6 - 33.0 pg Final    MCHC 07/08/2020 30.5* 31.5 - 35.7 g/dL Final    RDW 07/08/2020 13.9  11.7 - 15.4 % Final    PLATELET 92/78/4837 275  150 - 450 x10E3/uL Final    Glucose 07/08/2020 89  65 - 99 mg/dL Final    BUN 07/08/2020 13  6 - 20 mg/dL Final    Creatinine 07/08/2020 0.78  0.57 - 1.00 mg/dL Final    GFR est non-AA 07/08/2020 99  >59 mL/min/1.73 Final    GFR est AA 07/08/2020 115  >59 mL/min/1.73 Final    BUN/Creatinine ratio 07/08/2020 17  9 - 23 Final    Sodium 07/08/2020 140  134 - 144 mmol/L Final    Potassium 07/08/2020 4.8  3.5 - 5.2 mmol/L Final    Chloride 07/08/2020 101  96 - 106 mmol/L Final    CO2 07/08/2020 25  20 - 29 mmol/L Final    Calcium 07/08/2020 9.1  8.7 - 10.2 mg/dL Final    Protein, total 07/08/2020 7.0  6.0 - 8.5 g/dL Final    Albumin 07/08/2020 4.2  3.8 - 4.8 g/dL Final    GLOBULIN, TOTAL 07/08/2020 2.8  1.5 - 4.5 g/dL Final    A-G Ratio 07/08/2020 1.5  1.2 - 2.2 Final    Bilirubin, total 07/08/2020 <0.2  0.0 - 1.2 mg/dL Final    Alk. phosphatase 07/08/2020 59  39 - 117 IU/L Final    AST (SGOT) 07/08/2020 11  0 - 40 IU/L Final    ALT (SGPT) 07/08/2020 11  0 - 32 IU/L Final    TSH 07/08/2020 1.340  0.450 - 4.500 uIU/mL Final       Review of Systems   Constitutional: Negative for activity change, fatigue and unexpected weight change. HENT: Negative for congestion, hearing loss, rhinorrhea and sore throat. Eyes: Negative for discharge. Respiratory: Negative for cough, chest tightness and shortness of breath. Cardiovascular: Negative for leg swelling. Gastrointestinal: Negative for abdominal pain, constipation and diarrhea. Genitourinary: Negative for dysuria, flank pain, frequency and urgency. Musculoskeletal: Negative for arthralgias, back pain and myalgias. Skin: Negative for color change and rash. Neurological: Positive for weakness (shakiness in BL knees). Negative for dizziness, light-headedness, numbness and headaches. Psychiatric/Behavioral: Negative for dysphoric mood and sleep disturbance. The patient is not nervous/anxious. Visit Vitals  /85 (BP 1 Location: Left arm)   Pulse 73   Temp 97.8 °F (36.6 °C)   Resp 15   Ht 5' 9\" (1.753 m)   Wt 255 lb (115.7 kg)   LMP 02/18/2015 Comment: bleeding consistently for 7 months now since 7/2014   SpO2 98%   BMI 37.66 kg/m²       Physical Exam  Vitals and nursing note reviewed. Constitutional:       General: She is not in acute distress. Appearance: Normal appearance. She is well-developed. She is not diaphoretic. HENT:      Right Ear: External ear normal.      Left Ear: External ear normal.   Eyes:      General: No scleral icterus. Right eye: No discharge. Left eye: No discharge. Extraocular Movements: Extraocular movements intact.       Conjunctiva/sclera: Conjunctivae normal.   Cardiovascular:      Rate and Rhythm: Normal rate and regular rhythm. Pulmonary:      Effort: Pulmonary effort is normal.      Breath sounds: Normal breath sounds. No wheezing. Abdominal:      General: Bowel sounds are normal.      Palpations: Abdomen is soft. Tenderness: There is no abdominal tenderness. Musculoskeletal:      Cervical back: Normal range of motion and neck supple. Lymphadenopathy:      Cervical: No cervical adenopathy. Neurological:      Mental Status: She is alert and oriented to person, place, and time. Psychiatric:         Mood and Affect: Mood and affect normal.               An electronic signature was used to authenticate this note.   -- Bria Trevino

## 2021-07-22 LAB
25(OH)D3+25(OH)D2 SERPL-MCNC: 31.6 NG/ML (ref 30–100)
ALBUMIN SERPL-MCNC: 4.3 G/DL (ref 3.8–4.8)
ALBUMIN/GLOB SERPL: 1.5 {RATIO} (ref 1.2–2.2)
ALP SERPL-CCNC: 90 IU/L (ref 48–121)
ALT SERPL-CCNC: 9 IU/L (ref 0–32)
AST SERPL-CCNC: 12 IU/L (ref 0–40)
BILIRUB SERPL-MCNC: 0.2 MG/DL (ref 0–1.2)
BUN SERPL-MCNC: 10 MG/DL (ref 6–20)
BUN/CREAT SERPL: 14 (ref 9–23)
CALCIUM SERPL-MCNC: 9.4 MG/DL (ref 8.7–10.2)
CHLORIDE SERPL-SCNC: 107 MMOL/L (ref 96–106)
CO2 SERPL-SCNC: 27 MMOL/L (ref 20–29)
CREAT SERPL-MCNC: 0.71 MG/DL (ref 0.57–1)
ERYTHROCYTE [DISTWIDTH] IN BLOOD BY AUTOMATED COUNT: 13.1 % (ref 11.7–15.4)
GLOBULIN SER CALC-MCNC: 2.8 G/DL (ref 1.5–4.5)
GLUCOSE SERPL-MCNC: 85 MG/DL (ref 65–99)
HCT VFR BLD AUTO: 40.7 % (ref 34–46.6)
HCV AB S/CO SERPL IA: <0.1 S/CO RATIO (ref 0–0.9)
HGB BLD-MCNC: 12.4 G/DL (ref 11.1–15.9)
MCH RBC QN AUTO: 23.6 PG (ref 26.6–33)
MCHC RBC AUTO-ENTMCNC: 30.5 G/DL (ref 31.5–35.7)
MCV RBC AUTO: 77 FL (ref 79–97)
PLATELET # BLD AUTO: 306 X10E3/UL (ref 150–450)
POTASSIUM SERPL-SCNC: 4.4 MMOL/L (ref 3.5–5.2)
PROT SERPL-MCNC: 7.1 G/DL (ref 6–8.5)
RBC # BLD AUTO: 5.26 X10E6/UL (ref 3.77–5.28)
SODIUM SERPL-SCNC: 145 MMOL/L (ref 134–144)
VIT B12 SERPL-MCNC: 232 PG/ML (ref 232–1245)
WBC # BLD AUTO: 4.3 X10E3/UL (ref 3.4–10.8)

## 2021-07-28 NOTE — PROGRESS NOTES
Results reviewed. Release via [employer], your blood report is back and Vitamin B 12 came back low. I would recommend coming to the office to get B12 injection and start taking oral B12 1000mcg as well. Your sodium came little high which means you are dehydrated. I would suggest drinking 7-8 glasses of water daily. If leg weakness does not improve please make an appointment.

## 2021-08-13 LAB — SARS-COV-2, NAA: POSITIVE

## 2021-08-16 ENCOUNTER — TELEPHONE (OUTPATIENT)
Dept: PRIMARY CARE CLINIC | Age: 36
End: 2021-08-16

## 2021-08-16 NOTE — TELEPHONE ENCOUNTER
----- Message from Alona Morales sent at 8/16/2021  9:19 AM EDT -----  Regarding: Dr. Elder Pearce first and last name: pt      Reason for call: Pt needs to r/s her B-12 shot. Currently has covid and in quarantine till 8/22/21. Would like to know when is should reschedule appt.       Callback required yes/no and why: yes      Best contact number(s): [phone]      Details to clarify the request: n/a       Alona Morales

## 2021-08-16 NOTE — TELEPHONE ENCOUNTER
Returned call to patient.  She stated she wanted know if there was a waiting period after her quarantine for her to come for her b12 inj, advised her she can contact the office after her quarantine for the inj

## 2021-09-10 ENCOUNTER — OFFICE VISIT (OUTPATIENT)
Dept: PRIMARY CARE CLINIC | Age: 36
End: 2021-09-10
Payer: MEDICAID

## 2021-09-10 VITALS
HEART RATE: 70 BPM | TEMPERATURE: 97.8 F | SYSTOLIC BLOOD PRESSURE: 130 MMHG | WEIGHT: 247 LBS | HEIGHT: 69 IN | OXYGEN SATURATION: 98 % | RESPIRATION RATE: 18 BRPM | BODY MASS INDEX: 36.58 KG/M2 | DIASTOLIC BLOOD PRESSURE: 86 MMHG

## 2021-09-10 DIAGNOSIS — K64.8 INTERNAL HEMORRHOID, BLEEDING: ICD-10-CM

## 2021-09-10 DIAGNOSIS — Z98.890 S/P GASTRIC SURGERY: ICD-10-CM

## 2021-09-10 DIAGNOSIS — Z00.00 PHYSICAL EXAM: ICD-10-CM

## 2021-09-10 DIAGNOSIS — F31.75 BIPOLAR DISORDER, IN PARTIAL REMISSION, MOST RECENT EPISODE DEPRESSED (HCC): ICD-10-CM

## 2021-09-10 DIAGNOSIS — K62.5 RECTAL BLEEDING: Primary | ICD-10-CM

## 2021-09-10 PROCEDURE — 99214 OFFICE O/P EST MOD 30 MIN: CPT | Performed by: INTERNAL MEDICINE

## 2021-09-10 RX ORDER — CYANOCOBALAMIN 1000 UG/ML
1000 INJECTION, SOLUTION INTRAMUSCULAR; SUBCUTANEOUS ONCE
COMMUNITY
End: 2022-03-03

## 2021-09-10 RX ORDER — HYDROCORTISONE 25 MG/G
CREAM TOPICAL 4 TIMES DAILY
Qty: 30 G | Refills: 0 | Status: SHIPPED | OUTPATIENT
Start: 2021-09-10 | End: 2021-11-23

## 2021-09-10 NOTE — PROGRESS NOTES
Lyle Del Castillo (: 1985) is a 29 y.o. female, established patient, here for evaluation of the following chief complaint(s):  Physical (labs) and Hemorrhoids (rectal bleding with BM)     Written by David Rolon, as dictated by Dr. Liss Parmar MD.      ASSESSMENT/PLAN:  Below is the assessment and plan developed based on review of pertinent history, physical exam, labs, studies, and medications. 1. Rectal bleeding  There were no external hemorrhoids found on exam today. We discussed that she likely has internal hemorrhoids. Provided her with a written referral to colorectal surgery. In the meantime, prescribed Anusol-HC 2.5% rectal cream to use for pain. Potential side effects were discussed. -     hydrocortisone (ANUSOL-HC) 2.5 % rectal cream; Insert  into rectum four (4) times daily. , Normal, Disp-30 g, R-0 sent to [place] [id number] RECTAL SURGERY    2. Physical exam  Complete physical exam done today. Ordered fasting labs for patient to complete today in office. Waiting on results. -     CBC W/O DIFF; Future  -     LIPID PANEL; Future  -     TSH 3RD GENERATION; Future    3. Bipolar disorder, in partial remission, most recent episode depressed ([facility] [address]  Well controlled on Abilify. She is followed by psychiatry. She should continue current medication. 4. S/P gastric surgery  She should continue on vitamin D3 supplements and vitamin B12 injections. 5. Internal hemorrhoid, bleeding  Provided her with a written referral to colorectal surgery. In the meantime, prescribed Anusol-HC 2.5% rectal cream to use for pain. Potential side effects were discussed. -     hydrocortisone (ANUSOL-HC) 2.5 % rectal cream; Insert  into rectum four (4) times daily. , Normal, Disp-30 g, R-0 sent to pharmacy.   -     REFERRAL TO COLON AND RECTAL SURGERY      SUBJECTIVE/OBJECTIVE:  HPI   The patient presents today  With C/o rectal bleed and requesting an  annual physical exam. She is fasting for blood work. She is c/o rectal bleeding and pain with BM which is a new complaint since her last visit. She is also having rectal pain and thinks she may have hemorrhoids. She denies a hx of hemorrhoids or constipation. She continues on Abilify for bipolar disorder which has been working well. She is followed by a psychiatrist.    She states her asthma has been well controlled. She is currently not using any inhalers. She is s/p gastric bypass and is on vitamin B12 injections. Patient Active Problem List   Diagnosis Code    Posttraumatic stress disorder F43.10    Bipolar disorder, in partial remission, most recent episode depressed ([facility] [address] F31.75    Asthma due to environmental allergies J45.909    S/P gastric surgery Z98.890        Current Outpatient Medications on File Prior to Visit   Medication Sig Dispense Refill    cyanocobalamin (VITAMIN B12) 1,000 mcg/mL injection 1,000 mcg by IntraMUSCular route once.  ARIPiprazole (Abilify Maintena) 400 mg sers 400 mg by IntraMUSCular route.  albuterol (PROVENTIL HFA, VENTOLIN HFA, PROAIR HFA) 90 mcg/actuation inhaler 1 Puff. No current facility-administered medications on file prior to visit.        Allergies   Allergen Reactions    Bactrim [Sulfamethoprim Ds] Hives    Tape [Adhesive] Rash       Past Medical History:   Diagnosis Date    Anemia     Asthma     10 yrs old    Attention deficit disorder with hyperactivity(314.01) 10/31/2013    Back pain     Bipolar I disorder, most recent episode (or current) manic, unspecified 10/31/2013    [facility]    Depression 9/25/13    bipolar disorder    Diabetes ([address]     Essential hypertension     with first pregnacy    Inability to control urination     Joint pain     Mood disorder ([address] 9/25/2013    Morbid obesity ([address]     Muscle pain     PCOS (polycystic ovarian syndrome)     A1c 6.6 4/2015    Personality disorder ([address]      histrionic              Alisa Deacon Jaylin Chinchilla    Posttraumatic stress disorder 10/31/2013    Prediabetes 12/31/13    A1c 6.3 per lab   6.6 4/2015    Sinus complaint     Skin disease     Frequent boils    Somatization disorder 10/31/2013    Unspecified personality disorder 10/31/2013    Unspecified sleep apnea     childhood --had T&A done    Urination frequency        Past Surgical History:   Procedure Laterality Date    HX GYN  2013    BTL    HX GYN      D & C    HX HYSTERECTOMY  2/25/15    [facility] at [address] [facility] TONSILLECTOMY  2007       Family History   Problem Relation Age of Onset    Diabetes Maternal Grandmother     Lung Disease Maternal Grandfather     Cancer Paternal Grandmother         Pancreatic    Diabetes Father     Hypertension Father     Anesth Problems Neg Hx        Social History     Socioeconomic History    Marital status:      Spouse name: Not on file    Number of children: Not on file    Years of education: Not on file    Highest education level: Not on file   Occupational History    Not on file   Tobacco Use    Smoking status: Never Smoker    Smokeless tobacco: Never Used   Vaping Use    Vaping Use: Never used   Substance and Sexual Activity    Alcohol use: No    Drug use: No    Sexual activity: Yes     Partners: Male     Birth control/protection: Condom   Other Topics Concern    Not on file   Social History Narrative    Not on file       Office Visit on 07/21/2021   Component Date Value Ref Range Status    Glucose 07/21/2021 85  65 - 99 mg/dL Final    BUN 07/21/2021 10  6 - 20 mg/dL Final    Creatinine 07/21/2021 0.71  0.57 - 1.00 mg/dL Final    GFR est non-AA 07/21/2021 111  >59 mL/min/1.73 Final    GFR est AA 07/21/2021 128  >59 mL/min/1.73 Final    Comment: **[facility] currently reports eGFR in compliance with the current**    recommendations of the [email or web address]. Jenifer Soliman will    update reporting as new guidelines are published from the NKF-ASN    Task force.       BUN/Creatinine ratio 07/21/2021 14  9 - 23 Final    Sodium 07/21/2021 145* 134 - 144 mmol/L Final    Potassium 07/21/2021 4.4  3.5 - 5.2 mmol/L Final    Chloride 07/21/2021 107* 96 - 106 mmol/L Final    CO2 07/21/2021 27  20 - 29 mmol/L Final    Calcium 07/21/2021 9.4  8.7 - 10.2 mg/dL Final    Protein, total 07/21/2021 7.1  6.0 - 8.5 g/dL Final    Albumin 07/21/2021 4.3  3.8 - 4.8 g/dL Final    GLOBULIN, TOTAL 07/21/2021 2.8  1.5 - 4.5 g/dL Final    A-G Ratio 07/21/2021 1.5  1.2 - 2.2 Final    Bilirubin, total 07/21/2021 0.2  0.0 - 1.2 mg/dL Final    Alk. phosphatase 07/21/2021 90  48 - 121 IU/L Final    AST (SGOT) 07/21/2021 12  0 - 40 IU/L Final    ALT (SGPT) 07/21/2021 9  0 - 32 IU/L Final    WBC 07/21/2021 4.3  3.4 - 10.8 x10E3/uL Final    RBC 07/21/2021 5.26  3.77 - 5.28 x10E6/uL Final    HGB 07/21/2021 12.4  11.1 - 15.9 g/dL Final    HCT 07/21/2021 40.7  34.0 - 46.6 % Final    MCV 07/21/2021 77* 79 - 97 fL Final    MCH 07/21/2021 23.6* 26.6 - 33.0 pg Final    MCHC 07/21/2021 30.5* 31.5 - 35.7 g/dL Final    RDW 07/21/2021 13.1  11.7 - 15.4 % Final    PLATELET 00/75/5445 515  150 - 450 x10E3/uL Final    VITAMIN D, 25-HYDROXY 07/21/2021 31.6  30.0 - 100.0 ng/mL Final    Comment: Vitamin D deficiency has been defined by the [place] of  Medicine and an Endocrine Society practice guideline as a  level of serum 25-OH vitamin D less than 20 ng/mL (1,2). The Endocrine Society went on to further define vitamin D  insufficiency as a level between 21 and 29 ng/mL (2). 1. IOM ([place] of [employer]). 2010. Dietary reference     intakes for calcium and D. [facility]: The     [employer]. 2. Dana MF, Cesario PARKS, Saad ROLDAN, et al.     Evaluation, treatment, and prevention of vitamin D     deficiency: an Endocrine Society clinical practice     guideline. JCEM. 2011 Jul; 96(7):1911-30.       Hep C Virus Ab 07/21/2021 <0.1  0.0 - 0.9 s/co ratio Final    Comment: Negative:     < 0.8                               Indeterminate: 0.8 - 0.9                                    Positive:     > 0.9   The CDC recommends that a positive HCV antibody result   be followed up with a HCV Nucleic Acid Amplification   test (065781).  Vitamin B12 07/21/2021 232  232 - 1,245 pg/mL Final      Review of Systems   Constitutional: Negative for activity change, fatigue and unexpected weight change. HENT: Negative for congestion, hearing loss, rhinorrhea and sore throat. Eyes: Negative for discharge. Respiratory: Negative for cough, chest tightness and shortness of breath. Cardiovascular: Negative for leg swelling. Gastrointestinal: Positive for blood in stool and rectal pain. Negative for abdominal pain, constipation and diarrhea. Genitourinary: Negative for dysuria, flank pain, frequency and urgency. Musculoskeletal: Negative for arthralgias, back pain and myalgias. Skin: Negative for color change and rash. Neurological: Negative for dizziness, light-headedness and headaches. Psychiatric/Behavioral: Negative for dysphoric mood and sleep disturbance. The patient is not nervous/anxious. Visit Vitals  /86 (BP 1 Location: Right arm, BP Patient Position: Sitting)   Pulse 70   Temp 97.8 °F (36.6 °C) (Temporal)   Resp 18   Ht 5' 9\" (1.753 m)   Wt 247 lb (112 kg)   LMP 02/18/2015 Comment: bleeding consistently for 7 months now since 7/2014   SpO2 98%   BMI 36.48 kg/m²      Physical Exam  Vitals and nursing note reviewed. Constitutional:       General: She is not in acute distress. Appearance: Normal appearance. She is normal weight. She is not diaphoretic. HENT:      Right Ear: Tympanic membrane, ear canal and external ear normal.      Left Ear: Tympanic membrane, ear canal and external ear normal.      Mouth/Throat:      Mouth: Mucous membranes are moist.      Pharynx: Oropharynx is clear.    Eyes:      General:         Right eye: No discharge. Left eye: No discharge. Extraocular Movements: Extraocular movements intact. Conjunctiva/sclera: Conjunctivae normal.   Neck:      Thyroid: No thyromegaly. Cardiovascular:      Rate and Rhythm: Normal rate and regular rhythm. Pulses: Normal pulses. Dorsalis pedis pulses are 2+ on the right side and 2+ on the left side. Pulmonary:      Effort: Pulmonary effort is normal.      Breath sounds: Normal breath sounds. No wheezing. Abdominal:      General: Bowel sounds are normal. There is no distension. Palpations: Abdomen is soft. Tenderness: There is no abdominal tenderness. Genitourinary:     Rectum: Normal.   Musculoskeletal:      Right lower leg: No edema. Left lower leg: No edema. Comments: B/L knees without crepitus   Lymphadenopathy:      Cervical: No cervical adenopathy. Neurological:      Deep Tendon Reflexes:      Reflex Scores:       Patellar reflexes are 2+ on the right side and 2+ on the left side. Psychiatric:         Mood and Affect: Mood and affect normal.         An electronic signature was used to authenticate this note.   -- Shivam Mckee

## 2021-09-10 NOTE — PROGRESS NOTES
Chief Complaint   Patient presents with    Physical     labs    Hemorrhoids     rectal bleding with BM

## 2021-11-16 ENCOUNTER — TRANSCRIBE ORDER (OUTPATIENT)
Dept: REGISTRATION | Age: 36
End: 2021-11-16

## 2021-11-16 DIAGNOSIS — Z01.812 PRE-PROCEDURE LAB EXAM: Primary | ICD-10-CM

## 2021-11-18 ENCOUNTER — HOSPITAL ENCOUNTER (OUTPATIENT)
Dept: PREADMISSION TESTING | Age: 36
Discharge: HOME | End: 2021-11-18
Attending: COLON & RECTAL SURGERY
Payer: MEDICAID

## 2021-11-18 DIAGNOSIS — Z01.812 PRE-PROCEDURE LAB EXAM: ICD-10-CM

## 2021-11-18 PROCEDURE — U0005 INFEC AGEN DETEC AMPLI PROBE: HCPCS

## 2021-11-19 LAB
SARS-COV-2, XPLCVT: NOT DETECTED
SOURCE, COVRS: NORMAL

## 2021-11-23 ENCOUNTER — ANESTHESIA EVENT (OUTPATIENT)
Dept: SURGERY | Age: 36
End: 2021-11-23
Payer: MEDICAID

## 2021-11-23 ENCOUNTER — ANESTHESIA (OUTPATIENT)
Dept: SURGERY | Age: 36
End: 2021-11-23
Payer: MEDICAID

## 2021-11-23 ENCOUNTER — HOSPITAL ENCOUNTER (OUTPATIENT)
Age: 36
Setting detail: OUTPATIENT SURGERY
Discharge: HOME | End: 2021-11-23
Attending: COLON & RECTAL SURGERY | Admitting: COLON & RECTAL SURGERY
Payer: MEDICAID

## 2021-11-23 VITALS
OXYGEN SATURATION: 100 % | HEART RATE: 70 BPM | WEIGHT: 246.91 LBS | DIASTOLIC BLOOD PRESSURE: 83 MMHG | HEIGHT: 69 IN | RESPIRATION RATE: 20 BRPM | SYSTOLIC BLOOD PRESSURE: 139 MMHG | TEMPERATURE: 98 F | BODY MASS INDEX: 36.57 KG/M2

## 2021-11-23 DIAGNOSIS — G89.18 ACUTE POST-OPERATIVE PAIN: Primary | ICD-10-CM

## 2021-11-23 LAB
GLUCOSE BLD STRIP.AUTO-MCNC: 57 MG/DL (ref 65–117)
GLUCOSE BLD STRIP.AUTO-MCNC: 73 MG/DL (ref 65–117)
GLUCOSE BLD STRIP.AUTO-MCNC: 76 MG/DL (ref 65–117)
SERVICE CMNT-IMP: ABNORMAL
SERVICE CMNT-IMP: NORMAL
SERVICE CMNT-IMP: NORMAL

## 2021-11-23 PROCEDURE — 74011250637 HC RX REV CODE- 250/637: Performed by: COLON & RECTAL SURGERY

## 2021-11-23 PROCEDURE — 77030040922 HC BLNKT HYPOTHRM STRY -A

## 2021-11-23 PROCEDURE — 51798 US URINE CAPACITY MEASURE: CPT

## 2021-11-23 PROCEDURE — 77030042556 HC PNCL CAUT -B: Performed by: COLON & RECTAL SURGERY

## 2021-11-23 PROCEDURE — 77030040361 HC SLV COMPR DVT MDII -B: Performed by: COLON & RECTAL SURGERY

## 2021-11-23 PROCEDURE — 76010000149 HC OR TIME 1 TO 1.5 HR: Performed by: COLON & RECTAL SURGERY

## 2021-11-23 PROCEDURE — 77030026438 HC STYL ET INTUB CARD -A: Performed by: ANESTHESIOLOGY

## 2021-11-23 PROCEDURE — 74011250636 HC RX REV CODE- 250/636: Performed by: NURSE ANESTHETIST, CERTIFIED REGISTERED

## 2021-11-23 PROCEDURE — 82962 GLUCOSE BLOOD TEST: CPT

## 2021-11-23 PROCEDURE — 76210000000 HC OR PH I REC 2 TO 2.5 HR: Performed by: COLON & RECTAL SURGERY

## 2021-11-23 PROCEDURE — 74011250636 HC RX REV CODE- 250/636: Performed by: ANESTHESIOLOGY

## 2021-11-23 PROCEDURE — 76210000020 HC REC RM PH II FIRST 0.5 HR: Performed by: COLON & RECTAL SURGERY

## 2021-11-23 PROCEDURE — 74011000250 HC RX REV CODE- 250: Performed by: NURSE ANESTHETIST, CERTIFIED REGISTERED

## 2021-11-23 PROCEDURE — 77030008684 HC TU ET CUF COVD -B: Performed by: ANESTHESIOLOGY

## 2021-11-23 PROCEDURE — 74011250637 HC RX REV CODE- 250/637: Performed by: ANESTHESIOLOGY

## 2021-11-23 PROCEDURE — 76060000033 HC ANESTHESIA 1 TO 1.5 HR: Performed by: COLON & RECTAL SURGERY

## 2021-11-23 PROCEDURE — 2709999900 HC NON-CHARGEABLE SUPPLY: Performed by: COLON & RECTAL SURGERY

## 2021-11-23 PROCEDURE — 74011000250 HC RX REV CODE- 250: Performed by: COLON & RECTAL SURGERY

## 2021-11-23 RX ORDER — ONDANSETRON 2 MG/ML
INJECTION INTRAMUSCULAR; INTRAVENOUS AS NEEDED
Status: DISCONTINUED | OUTPATIENT
Start: 2021-11-23 | End: 2021-11-23 | Stop reason: HOSPADM

## 2021-11-23 RX ORDER — KETOROLAC TROMETHAMINE 30 MG/ML
INJECTION, SOLUTION INTRAMUSCULAR; INTRAVENOUS AS NEEDED
Status: DISCONTINUED | OUTPATIENT
Start: 2021-11-23 | End: 2021-11-23 | Stop reason: HOSPADM

## 2021-11-23 RX ORDER — OXYCODONE HYDROCHLORIDE 5 MG/1
5 TABLET ORAL ONCE
Status: COMPLETED | OUTPATIENT
Start: 2021-11-23 | End: 2021-11-23

## 2021-11-23 RX ORDER — MIDAZOLAM HYDROCHLORIDE 1 MG/ML
1 INJECTION, SOLUTION INTRAMUSCULAR; INTRAVENOUS AS NEEDED
Status: DISCONTINUED | OUTPATIENT
Start: 2021-11-23 | End: 2021-11-23 | Stop reason: HOSPADM

## 2021-11-23 RX ORDER — BUPIVACAINE HYDROCHLORIDE AND EPINEPHRINE 2.5; 5 MG/ML; UG/ML
30 INJECTION, SOLUTION EPIDURAL; INFILTRATION; INTRACAUDAL; PERINEURAL ONCE
Status: DISCONTINUED | OUTPATIENT
Start: 2021-11-23 | End: 2021-11-23 | Stop reason: HOSPADM

## 2021-11-23 RX ORDER — SODIUM CHLORIDE 0.9 % (FLUSH) 0.9 %
5-40 SYRINGE (ML) INJECTION AS NEEDED
Status: DISCONTINUED | OUTPATIENT
Start: 2021-11-23 | End: 2021-11-23 | Stop reason: HOSPADM

## 2021-11-23 RX ORDER — SODIUM CHLORIDE, SODIUM LACTATE, POTASSIUM CHLORIDE, CALCIUM CHLORIDE 600; 310; 30; 20 MG/100ML; MG/100ML; MG/100ML; MG/100ML
100 INJECTION, SOLUTION INTRAVENOUS CONTINUOUS
Status: DISCONTINUED | OUTPATIENT
Start: 2021-11-23 | End: 2021-11-23 | Stop reason: HOSPADM

## 2021-11-23 RX ORDER — SODIUM CHLORIDE 0.9 % (FLUSH) 0.9 %
5-40 SYRINGE (ML) INJECTION EVERY 8 HOURS
Status: DISCONTINUED | OUTPATIENT
Start: 2021-11-23 | End: 2021-11-23 | Stop reason: HOSPADM

## 2021-11-23 RX ORDER — MORPHINE SULFATE 2 MG/ML
2 INJECTION, SOLUTION INTRAMUSCULAR; INTRAVENOUS
Status: DISCONTINUED | OUTPATIENT
Start: 2021-11-23 | End: 2021-11-23 | Stop reason: HOSPADM

## 2021-11-23 RX ORDER — LIDOCAINE HYDROCHLORIDE 20 MG/ML
INJECTION, SOLUTION EPIDURAL; INFILTRATION; INTRACAUDAL; PERINEURAL AS NEEDED
Status: DISCONTINUED | OUTPATIENT
Start: 2021-11-23 | End: 2021-11-23 | Stop reason: HOSPADM

## 2021-11-23 RX ORDER — ONDANSETRON 2 MG/ML
4 INJECTION INTRAMUSCULAR; INTRAVENOUS AS NEEDED
Status: DISCONTINUED | OUTPATIENT
Start: 2021-11-23 | End: 2021-11-23 | Stop reason: HOSPADM

## 2021-11-23 RX ORDER — ROPIVACAINE HYDROCHLORIDE 5 MG/ML
30 INJECTION, SOLUTION EPIDURAL; INFILTRATION; PERINEURAL AS NEEDED
Status: DISCONTINUED | OUTPATIENT
Start: 2021-11-23 | End: 2021-11-23 | Stop reason: HOSPADM

## 2021-11-23 RX ORDER — DEXAMETHASONE SODIUM PHOSPHATE 4 MG/ML
INJECTION, SOLUTION INTRA-ARTICULAR; INTRALESIONAL; INTRAMUSCULAR; INTRAVENOUS; SOFT TISSUE AS NEEDED
Status: DISCONTINUED | OUTPATIENT
Start: 2021-11-23 | End: 2021-11-23 | Stop reason: HOSPADM

## 2021-11-23 RX ORDER — PROPOFOL 10 MG/ML
INJECTION, EMULSION INTRAVENOUS AS NEEDED
Status: DISCONTINUED | OUTPATIENT
Start: 2021-11-23 | End: 2021-11-23 | Stop reason: HOSPADM

## 2021-11-23 RX ORDER — DIPHENHYDRAMINE HYDROCHLORIDE 50 MG/ML
12.5 INJECTION, SOLUTION INTRAMUSCULAR; INTRAVENOUS AS NEEDED
Status: DISCONTINUED | OUTPATIENT
Start: 2021-11-23 | End: 2021-11-23 | Stop reason: HOSPADM

## 2021-11-23 RX ORDER — GLYCOPYRROLATE 0.2 MG/ML
INJECTION INTRAMUSCULAR; INTRAVENOUS AS NEEDED
Status: DISCONTINUED | OUTPATIENT
Start: 2021-11-23 | End: 2021-11-23 | Stop reason: HOSPADM

## 2021-11-23 RX ORDER — FENTANYL CITRATE 50 UG/ML
INJECTION, SOLUTION INTRAMUSCULAR; INTRAVENOUS AS NEEDED
Status: DISCONTINUED | OUTPATIENT
Start: 2021-11-23 | End: 2021-11-23 | Stop reason: HOSPADM

## 2021-11-23 RX ORDER — LIDOCAINE HYDROCHLORIDE 10 MG/ML
0.1 INJECTION, SOLUTION EPIDURAL; INFILTRATION; INTRACAUDAL; PERINEURAL AS NEEDED
Status: DISCONTINUED | OUTPATIENT
Start: 2021-11-23 | End: 2021-11-23 | Stop reason: HOSPADM

## 2021-11-23 RX ORDER — FENTANYL CITRATE 50 UG/ML
50 INJECTION, SOLUTION INTRAMUSCULAR; INTRAVENOUS AS NEEDED
Status: DISCONTINUED | OUTPATIENT
Start: 2021-11-23 | End: 2021-11-23 | Stop reason: HOSPADM

## 2021-11-23 RX ORDER — DEXMEDETOMIDINE HYDROCHLORIDE 100 UG/ML
INJECTION, SOLUTION INTRAVENOUS AS NEEDED
Status: DISCONTINUED | OUTPATIENT
Start: 2021-11-23 | End: 2021-11-23 | Stop reason: HOSPADM

## 2021-11-23 RX ORDER — MIDAZOLAM HYDROCHLORIDE 1 MG/ML
0.5 INJECTION, SOLUTION INTRAMUSCULAR; INTRAVENOUS
Status: DISCONTINUED | OUTPATIENT
Start: 2021-11-23 | End: 2021-11-23 | Stop reason: HOSPADM

## 2021-11-23 RX ORDER — SODIUM CHLORIDE, SODIUM LACTATE, POTASSIUM CHLORIDE, CALCIUM CHLORIDE 600; 310; 30; 20 MG/100ML; MG/100ML; MG/100ML; MG/100ML
INJECTION, SOLUTION INTRAVENOUS
Status: DISCONTINUED | OUTPATIENT
Start: 2021-11-23 | End: 2021-11-23 | Stop reason: HOSPADM

## 2021-11-23 RX ORDER — NEOSTIGMINE METHYLSULFATE 1 MG/ML
INJECTION, SOLUTION INTRAVENOUS AS NEEDED
Status: DISCONTINUED | OUTPATIENT
Start: 2021-11-23 | End: 2021-11-23 | Stop reason: HOSPADM

## 2021-11-23 RX ORDER — HYDROMORPHONE HYDROCHLORIDE 1 MG/ML
0.5 INJECTION, SOLUTION INTRAMUSCULAR; INTRAVENOUS; SUBCUTANEOUS
Status: DISCONTINUED | OUTPATIENT
Start: 2021-11-23 | End: 2021-11-23 | Stop reason: HOSPADM

## 2021-11-23 RX ORDER — ROCURONIUM BROMIDE 10 MG/ML
INJECTION, SOLUTION INTRAVENOUS AS NEEDED
Status: DISCONTINUED | OUTPATIENT
Start: 2021-11-23 | End: 2021-11-23 | Stop reason: HOSPADM

## 2021-11-23 RX ORDER — KETAMINE HYDROCHLORIDE 10 MG/ML
INJECTION, SOLUTION INTRAMUSCULAR; INTRAVENOUS AS NEEDED
Status: DISCONTINUED | OUTPATIENT
Start: 2021-11-23 | End: 2021-11-23 | Stop reason: HOSPADM

## 2021-11-23 RX ORDER — FENTANYL CITRATE 50 UG/ML
25 INJECTION, SOLUTION INTRAMUSCULAR; INTRAVENOUS
Status: DISCONTINUED | OUTPATIENT
Start: 2021-11-23 | End: 2021-11-23 | Stop reason: HOSPADM

## 2021-11-23 RX ORDER — ACETAMINOPHEN 325 MG/1
650 TABLET ORAL ONCE
Status: COMPLETED | OUTPATIENT
Start: 2021-11-23 | End: 2021-11-23

## 2021-11-23 RX ORDER — BUPIVACAINE HYDROCHLORIDE 2.5 MG/ML
INJECTION, SOLUTION EPIDURAL; INFILTRATION; INTRACAUDAL AS NEEDED
Status: DISCONTINUED | OUTPATIENT
Start: 2021-11-23 | End: 2021-11-23 | Stop reason: HOSPADM

## 2021-11-23 RX ORDER — MIDAZOLAM HYDROCHLORIDE 1 MG/ML
INJECTION, SOLUTION INTRAMUSCULAR; INTRAVENOUS AS NEEDED
Status: DISCONTINUED | OUTPATIENT
Start: 2021-11-23 | End: 2021-11-23 | Stop reason: HOSPADM

## 2021-11-23 RX ORDER — SODIUM CHLORIDE 9 MG/ML
25 INJECTION, SOLUTION INTRAVENOUS CONTINUOUS
Status: DISCONTINUED | OUTPATIENT
Start: 2021-11-23 | End: 2021-11-23 | Stop reason: HOSPADM

## 2021-11-23 RX ORDER — OXYCODONE HYDROCHLORIDE 5 MG/1
5 TABLET ORAL
Qty: 30 TABLET | Refills: 0 | Status: SHIPPED | OUTPATIENT
Start: 2021-11-23 | End: 2021-11-30

## 2021-11-23 RX ADMIN — SODIUM CHLORIDE, POTASSIUM CHLORIDE, SODIUM LACTATE AND CALCIUM CHLORIDE: 600; 310; 30; 20 INJECTION, SOLUTION INTRAVENOUS at 11:13

## 2021-11-23 RX ADMIN — ROCURONIUM BROMIDE 40 MG: 10 SOLUTION INTRAVENOUS at 11:48

## 2021-11-23 RX ADMIN — KETAMINE HYDROCHLORIDE 20 MG: 10 INJECTION, SOLUTION INTRAMUSCULAR; INTRAVENOUS at 12:01

## 2021-11-23 RX ADMIN — MIDAZOLAM 0.5 MG: 1 INJECTION INTRAMUSCULAR; INTRAVENOUS at 13:19

## 2021-11-23 RX ADMIN — SODIUM CHLORIDE, POTASSIUM CHLORIDE, SODIUM LACTATE AND CALCIUM CHLORIDE 100 ML/HR: 600; 310; 30; 20 INJECTION, SOLUTION INTRAVENOUS at 10:42

## 2021-11-23 RX ADMIN — GLYCOPYRROLATE 0.4 MG: 0.2 INJECTION, SOLUTION INTRAMUSCULAR; INTRAVENOUS at 12:23

## 2021-11-23 RX ADMIN — MIDAZOLAM 0.5 MG: 1 INJECTION INTRAMUSCULAR; INTRAVENOUS at 13:45

## 2021-11-23 RX ADMIN — LIDOCAINE HYDROCHLORIDE 100 MG: 20 INJECTION, SOLUTION EPIDURAL; INFILTRATION; INTRACAUDAL; PERINEURAL at 11:48

## 2021-11-23 RX ADMIN — ONDANSETRON HYDROCHLORIDE 4 MG: 2 INJECTION, SOLUTION INTRAMUSCULAR; INTRAVENOUS at 12:13

## 2021-11-23 RX ADMIN — NEOSTIGMINE METHYLSULFATE 3 MG: 1 INJECTION, SOLUTION INTRAVENOUS at 12:23

## 2021-11-23 RX ADMIN — FENTANYL CITRATE 25 MCG: 50 INJECTION INTRAMUSCULAR; INTRAVENOUS at 13:19

## 2021-11-23 RX ADMIN — DEXAMETHASONE SODIUM PHOSPHATE 4 MG: 4 INJECTION, SOLUTION INTRAMUSCULAR; INTRAVENOUS at 11:59

## 2021-11-23 RX ADMIN — OXYCODONE 5 MG: 5 TABLET ORAL at 13:26

## 2021-11-23 RX ADMIN — ONDANSETRON HYDROCHLORIDE 4 MG: 2 SOLUTION INTRAMUSCULAR; INTRAVENOUS at 13:19

## 2021-11-23 RX ADMIN — FENTANYL CITRATE 25 MCG: 50 INJECTION INTRAMUSCULAR; INTRAVENOUS at 14:10

## 2021-11-23 RX ADMIN — MIDAZOLAM 2 MG: 1 INJECTION INTRAMUSCULAR; INTRAVENOUS at 11:36

## 2021-11-23 RX ADMIN — KETOROLAC TROMETHAMINE 30 MG: 30 INJECTION, SOLUTION INTRAMUSCULAR; INTRAVENOUS at 12:23

## 2021-11-23 RX ADMIN — FENTANYL CITRATE 100 MCG: 50 INJECTION, SOLUTION INTRAMUSCULAR; INTRAVENOUS at 11:48

## 2021-11-23 RX ADMIN — KETAMINE HYDROCHLORIDE 30 MG: 10 INJECTION, SOLUTION INTRAMUSCULAR; INTRAVENOUS at 12:13

## 2021-11-23 RX ADMIN — PROPOFOL 200 MG: 10 INJECTION, EMULSION INTRAVENOUS at 11:48

## 2021-11-23 RX ADMIN — ACETAMINOPHEN 650 MG: 325 TABLET ORAL at 10:46

## 2021-11-23 RX ADMIN — DEXMEDETOMIDINE HYDROCHLORIDE 10 MCG: 100 INJECTION, SOLUTION, CONCENTRATE INTRAVENOUS at 11:49

## 2021-11-23 NOTE — H&P
History and Physical (outpatient)    Patient: Patty Garcia 29 y.o. female     Chief Complaint:  Chronic anal fissure    History of Present Illness: The patient is a 71-year-old female who I first saw on 9/16/2021 for evaluation of rectal pain and bleeding that had been occurring for approximately one month. The examination that day was limited by pain, but it revealed a posterior midline anal fissure. Medical treatment, including the twice daily application of Diltiazem 2% ointment, was begun. She returned for routine follow-up on 10/18/2021, and on that day she seemed to have experienced some improvement. We therefore continued the same treatment. When she returned on 11/15/2021 for another follow-up appointment, she reported that she was still having a significant amount of pain and some bleeding. The examination revealed that the posterior midline anal fissure was still present and that it did not appear to have improved since the last examination.         History:  Past Medical History:   Diagnosis Date    Anemia     Asthma     10 yrs old    Attention deficit disorder with hyperactivity(314.01) 10/31/2013    Back pain     Bipolar I disorder, most recent episode (or current) manic, unspecified 10/31/2013    [facility] COVID-19 vaccine series completed     Moderna dose #1 received in 2/2021; dose #2 received in 3/2021    COVID-19 virus infection 08/13/2021    Depression 9/25/13    bipolar disorder    Diabetes ([address]     Essential hypertension     with first pregnacy    Inability to control urination     Joint pain     Mood disorder ([address] 9/25/2013    Morbid obesity ([address]     Muscle pain     PCOS (polycystic ovarian syndrome)     A1c 6.6 4/2015    Personality disorder ([address]      histrionic              [place]    Posttraumatic stress disorder 10/31/2013    Prediabetes 12/31/13    A1c 6.3 per lab   6.6 4/2015    Sinus complaint     Skin disease     Frequent boils    Somatization disorder 10/31/2013    Unspecified personality disorder 10/31/2013    Unspecified sleep apnea     childhood --had T&A done    Urination frequency        Past Surgical History:   Procedure Laterality Date    HX GYN  2013    BTL    HX GYN      D & C    HX HYSTERECTOMY  2/25/15    Spasic at [address] [place] [address] OTHER SURGICAL  08/31/2020    Laparoscopic gastric bypass; Dr. Honey Ramirez ([facility]  HX TONSILLECTOMY  2007       Family History   Problem Relation Age of Onset    Diabetes Maternal Grandmother     Lung Disease Maternal Grandfather     Cancer Paternal Grandmother         Pancreatic    Diabetes Father     Hypertension Father     Anesth Problems Neg Hx      Social History     Socioeconomic History    Marital status:      Spouse name: Not on file    Number of children: Not on file    Years of education: Not on file    Highest education level: Not on file   Occupational History    Not on file   Tobacco Use    Smoking status: Never Smoker    Smokeless tobacco: Never Used   Vaping Use    Vaping Use: Never used   Substance and Sexual Activity    Alcohol use: No    Drug use: No    Sexual activity: Yes     Partners: Male     Birth control/protection: Condom   Other Topics Concern    Not on file   Social History Narrative    Not on file     Social Determinants of Health     Financial Resource Strain:     Difficulty of Paying Living Expenses: Not on file   Food Insecurity:     Worried About Running Out of Food in the Last Year: Not on file    Edith of Food in the Last Year: Not on file   Transportation Needs:     Lack of Transportation (Medical): Not on file    Lack of Transportation (Non-Medical):  Not on file   Physical Activity:     Days of Exercise per Week: Not on file    Minutes of Exercise per Session: Not on file   Stress:     Feeling of Stress : Not on file   Social Connections:     Frequency of Communication with Friends and Family: Not on file    Frequency of Social Gatherings with Friends and Family: Not on file    Attends Buddhist Services: Not on file    Active Member of Clubs or Organizations: Not on file    Attends Club or Organization Meetings: Not on file    Marital Status: Not on file   Intimate Partner Violence:     Fear of Current or Ex-Partner: Not on file    Emotionally Abused: Not on file    Physically Abused: Not on file    Sexually Abused: Not on file   Housing Stability:     Unable to Pay for Housing in the Last Year: Not on file    Number of [place] in the Last Year: Not on file    Unstable Housing in the Last Year: Not on file       Allergies: Allergies   Allergen Reactions    Bactrim [Sulfamethoprim Ds] Hives    Tape [Adhesive] Rash       Current Medications:  Cannot display prior to admission medications because the patient has not been admitted in this contact. No current facility-administered medications for this encounter. Current Outpatient Medications   Medication Sig    cyanocobalamin (VITAMIN B12) 1,000 mcg/mL injection 1,000 mcg by IntraMUSCular route once.  hydrocortisone (ANUSOL-HC) 2.5 % rectal cream Insert  into rectum four (4) times daily.  ARIPiprazole (Abilify Maintena) 400 mg sers 400 mg by IntraMUSCular route.  albuterol (PROVENTIL HFA, VENTOLIN HFA, PROAIR HFA) 90 mcg/actuation inhaler 1 Puff. Physical Exam:  Last menstrual period 02/18/2015. GENERAL:  No apparent distress. LUNGS:  Clear to auscultation bilaterally. HEART:  Regular rate and rhythm with no murmurs, gallops, or rubs. NEUROLOGIC: Alert and oriented. No gross deficits. Alerts:    Hospital Problems  Date Reviewed: 9/10/2021    None          Laboratory:    No results for input(s): HGB, HCT, WBC, PLT, INR, BUN, CREA, K, CRCLT, HGBEXT, HCTEXT, PLTEXT, INREXT in the last 72 hours.     No lab exists for component: PTT, PT    Assessment and Plan:  Medical treatment of the patient's chronic anal fissure has failed, and surgical treatment is therefore indicated. The risks and the recovery expectations associated with an anal sphincterotomy have been discussed in detail. The possible importance of rigid proctosigmoidoscopy to fully evaluate the rectum for other significant pathology has also been explained. The patient appears to have understood, and she has agreed to proceed.

## 2021-11-23 NOTE — BRIEF OP NOTE
Brief Postoperative Note    Patient: Anna Zepeda  YOB: 1985  MRN: [id number]    Date of Procedure: 11/23/2021     Pre-Op Diagnosis:  Anal fissure  Post-Op Diagnosis:  Anal fissure  Procedure:  Rigid proctosigmoidoscopy and right lateal internal sphincterotomy  Surgeon:  Quan Shepherd MD  Surgical Assistant: None  Anesthesia: General endotracheal  Specimens:  None  Drains:  None  Estimated Blood Loss:  < 5 mL  Crystalloid:  221 mL  Complications:  None apparent  Implants:  None  Findings:  Deep posterior midline anal fissure. Rigid proctosigmoidoscopy to 15 cm from the anal verge otherwise normal, but visualization was somewhat limited secondary to retained stool.       Electronically Signed by Quan Shepherd MD

## 2021-11-23 NOTE — DISCHARGE INSTRUCTIONS
****  You took ROXICODONE at 1:30PM.  ****  You had Tylenol/ Acetaminophen at 1045AM. You can take more Tylenol/ Acetaminophen containing medications after 4:45PM. **** YOU HAD TORADOL ( NSAID) @ 10:20. Post-Operative Instructions      1. Diet:  Consume a high fiber diet as tolerated. Such a diet may include fresh fruits, vegetables, and whole grain cereals and breads. You should also drink 8-10 glasses of water, juice, or other non-alcoholic beverages per day. 2. Fiber Supplements:  Take 1 dose of Metamucil or other over-the-counter fiber supplement (Citrucel, BeneFiber, etc.) as directed each morning and another dose each evening. 3. Medications: Take prescription pain medication (oxycodone) as prescribed. Do not drive, drink alcohol, or operate machinery while taking the oxycodone. Take docusate (non-prescription stool softener) twice per day as directed. Beginning today, take maximum doses of Tylenol (1000 mg every 6 hours) until you do not need pain medication anymore. Doing so will help you to manage the pain and to use less of the oxycodone. Beginning on the second day after surgery, you may take ibuprofen as directed in addition to or instead of the prescription medication if there has been no significant bleeding. Do not take pain medication on an empty stomach. Do not take aspirin or aspirin-containing products for 10 days following the procedure. 4. Activity:  Do not engage in any heavy lifting or other strenuous activity until you have been seen for follow-up. You may walk as much as you want, and you may climb stairs. Frequent walking will help prevent constipation. 5. Sitz Baths (soaking):  Remove the dressing on the first day after surgery or just before your next bowel movement (whichever comes first), then begin performing sitz baths 3 times per day and after bowel movements.   The water should be very warm, and you should soak for no longer than 20 minutes at a time.  Do not wipe the anal area with dry toilet tissue; instead, use baby wipes. After sitz baths, cover the anal area with a gauze dressing. You may also apply Neosporin, Neosporin + Pain Relief, or a non-prescription topical anesthetic such as lidocaine or Nupercainal ointment. 6. Constipation:  If you have not moved your bowels by sometime on Thursday 11/25/2021, take 1 Tablespoon of Milk of Magnesia. Repeat in 6 hours if you have no results. If taking the second dose of the Milk of Magnesia does not result in a bowel movement, take two to four 5 mg Dulcolax (bisacodyl) tablets. If you have not had a bowel movement by the next morning after taking the bisacodyl, call my office. 7. Bleeding:  A small amount of bright red bleeding can be expected for the next several days. If bleeding appears to be continuous, call my office. 8. Other Problems:  If you experience intolerable pain, fever (temperature of 101 or more), or inability to urinate, call my office. 9. Questions: If you have any questions about your post-operative condition or care, do not hesitate to call my office. 10. Work:  You may return to work in two weeks. 11.  Other: For anything other than scheduling, please call [phone]. 12.  Follow-up:  Please return to my office at 10:00 AM on Tuesday 12/7/2021. If you need to change the appointment, please call [phone] on a weekday between 9:00 AM and noon. Jeny Hernandez M.D.  [phone]        ______________________________________________________________________    Anesthesia Discharge Instructions    After general anesthesia or intervenous sedation, for 24 hours or while taking prescription Narcotics:  · Limit your activities  · Do not drive or operate hazardous machinery  · If you have not urinated within 8 hours after discharge, please contact your surgeon on call.   · Do not make important personal or business decisions  · Do not drink alcoholic beverages    Report the following to your surgeon:  · Excessive pain, swelling, redness or odor of or around the surgical area  · Temperature over 100.5 degrees  · Nausea and vomiting lasting longer than 4 hours or if unable to take medication  · Any signs of decreased circulation or nerve impairment to extremity:  Change in color, persistent numbness, tingling, coldness or increased pain.   · Any questions

## 2021-11-23 NOTE — ANESTHESIA POSTPROCEDURE EVALUATION
Procedure(s):  RIGID PROCTOSIGMOIDOSCOPY, ANAL SPHINECTEROTOMY. general    Anesthesia Post Evaluation        Patient location during evaluation: PACU  Note status: Adequate. Level of consciousness: responsive to verbal stimuli and sleepy but conscious  Pain management: satisfactory to patient  Airway patency: patent  Anesthetic complications: no  Cardiovascular status: acceptable  Respiratory status: acceptable  Hydration status: acceptable  Comments: +Post-Anesthesia Evaluation and Assessment    Patient: Mau Donahue MRN: [id number]  SSN: [id number]   YOB: 1985  Age: 29 y.o. Sex: female          Cardiovascular Function/Vital Signs    BP (P) 139/89   Pulse (!) (P) 59   Temp (P) 36.6 °C (97.8 °F)   Resp (P) 14   Ht 5' 9\" (1.753 m)   Wt 112 kg (246 lb 14.6 oz)   SpO2 100%   BMI 36.46 kg/m²     Patient is status post Procedure(s):  RIGID PROCTOSIGMOIDOSCOPY, ANAL SPHINECTEROTOMY. Nausea/Vomiting: Controlled. Postoperative hydration reviewed and adequate. Pain:  Pain Scale 1: Numeric (0 - 10) (11/23/21 1330)  Pain Intensity 1: 0 (11/23/21 1330)   Managed. Neurological Status:   Neuro (WDL): Within Defined Limits (11/23/21 1330)   At baseline. Mental Status and Level of Consciousness: Arousable. Pulmonary Status:   O2 Device: None (Room air) (11/23/21 1330)   Adequate oxygenation and airway patent. Complications related to anesthesia: None    Post-anesthesia assessment completed. No concerns. I have evaluated the patient and the patient is stable and ready to be discharged from PACU . Signed By: Reina Valdez MD    11/23/2021        INITIAL Post-op Vital signs:   Vitals Value Taken Time   /81 11/23/21 1445   Temp 36.7 °C (98 °F) 11/23/21 1330   Pulse 61 11/23/21 1446   Resp 16 11/23/21 1446   SpO2 100 % 11/23/21 1446   Vitals shown include unvalidated device data.

## 2021-11-23 NOTE — PERIOP NOTES
DISCHARGE INSTRUCTIONS GIVEN TO PATIENT. I HAD CALLED HER MOTHER TO GIVE HER INSTRUCTIONS, BUT SHE SAID DR. WALTERS HAD ALREADY GONE OVER ALL INSTRUCTIONS WITH HER. I ASKED HER TO PLEASES REVIEW THESE INSTRUCTIONS. COPY SENT WITH PATIENT.

## 2021-11-23 NOTE — ANESTHESIA PREPROCEDURE EVALUATION
Relevant Problems   RESPIRATORY SYSTEM   (+) Asthma due to environmental allergies      NEUROLOGY   (+) Bipolar disorder, in partial remission, most recent episode depressed (HCC)   (+) Posttraumatic stress disorder       Anesthetic History   No history of anesthetic complications            Review of Systems / Medical History  Patient summary reviewed, nursing notes reviewed and pertinent labs reviewed    Pulmonary  Within defined limits      Sleep apnea    Asthma        Neuro/Psych   Within defined limits      Psychiatric history     Cardiovascular  Within defined limits  Hypertension              Exercise tolerance: >4 METS     GI/Hepatic/Renal  Within defined limits              Endo/Other  Within defined limits  Diabetes    Morbid obesity     Other Findings              Physical Exam    Airway  Mallampati: II  TM Distance: > 6 cm  Neck ROM: normal range of motion   Mouth opening: Normal     Cardiovascular  Regular rate and rhythm,  S1 and S2 normal,  no murmur, click, rub, or gallop             Dental  No notable dental hx       Pulmonary  Breath sounds clear to auscultation               Abdominal  GI exam deferred       Other Findings            Anesthetic Plan    ASA: 2  Anesthesia type: general          Induction: Intravenous  Anesthetic plan and risks discussed with: Patient

## 2021-11-24 NOTE — OP NOTES
[facility]  OPERATIVE REPORT    Name:  Jyoti Zapata  MR#:  [id number]  :  1985  ACCOUNT #:  [de-identified]    DATE OF SERVICE:  2021    PREOPERATIVE DIAGNOSIS:  Anal fissure. POSTOPERATIVE DIAGNOSIS:  Anal fissure. PROCEDURE PERFORMED:  Rigid proctosigmoidoscopy and right lateral internal sphincterotomy. SURGEON:  Rosario Sanchez MD    ASSISTANT: None. ANESTHESIA:  General endotracheal.    SPECIMENS REMOVED:  None. TUBES AND DRAINS:  None. ESTIMATED BLOOD LOSS:  Less than 5 mL. IV FLUIDS:  Crystalloid 400 mL. COMPLICATIONS:  None apparent. IMPLANTS:  None. INDICATIONS FOR PROCEDURE:  The patient is a 78-year-old female whom I first saw on 2021 for evaluation of rectal pain and bleeding that had been occurring for approximately 1 month. The examination that day was limited by pain, but it revealed a posterior midline anal fissure. Medical treatment, including the twice daily application diltiazem 2% ointment, was begun. She returned for routine followup on 10/18/2021, and on that day she seemed to have experienced some improvement. We therefore continue the same treatment. When she returned on 11/15/2021 for another follow-up appointment, she reported that she was still having a significant amount of pain and some bleeding. The examination revealed that the posterior midline anal fissure was still present and that it did not appear to have improved since the last examination. The treatment options going forward were discussed in detail, and the patient chose to proceed with surgery. The risks of an anal sphincterotomy were explained in detail. The possible utility of a full examination of the rectum via rigid proctosigmoidoscopy was also explained. The patient appeared to have understood and she agreed to proceed. OPERATIVE FINDINGS:  There was a deep posterior midline anal fissure.   Rigid proctosigmoidoscopy to 15 cm from the anal verge was otherwise normal, but visualization was somewhat limited secondary to retained stool. PROCEDURE:  After informed consent was obtained, the patient was taken to the operating room where standard monitoring devices were attached and adequate general endotracheal anesthesia was induced. She was then placed in the prone jackknife position on the operating table with all pressure points padded appropriately and with the lower extremities fitted with pneumatic compression stockings. The buttocks were retracted bilaterally using Mastisol and tape. Visual inspection and digital rectal examination were performed. The rigid proctosigmoidoscope was lubricated and inserted transanally into the rectum. It was then advanced under direct vision to approximately 15 cm from the anal verge. As it was slowly withdrawn, careful inspection was continued. The findings were as noted above. The perineum was prepped and draped in the usual sterile fashion. Visualization within the anal canal was provided using the Hill-Perez retractors. A smaller retractor was used initially, and later the larger one was used. The anal canal was thoroughly examined. A Barr fistula probe was used to gently explore the fissure, which was found not to track anywhere. There was no evidence of a fistula. In the right lateral position, the intersphincteric groove was palpated. A curvilinear incision was made over the groove using the scalpel and very limited use of the electrocautery. The intersphincteric groove was penetrated using a tonsil clamp and fibers of the distal portion of the internal sphincter muscle were elevated. The fibers were then divided using the electrocautery. The resulting wound was essentially hemostatic already. It was irrigated with a Betadine solution and then wound closure was performed with a combination of running and interrupted 2-0 chromic sutures. Final inspection revealed good hemostasis. 0.25% bupivacaine with epinephrine was infiltrated to provide some post-operative analgesia. No packing was inserted. An external dressing consisting of Xeroform, 4 x 4's, and an ABD was put in place. There were no apparent complications, and the patient appeared to have tolerated the procedure well. At its conclusion, she was extubated and transported in stable condition to recovery room. Stew Muir MD      [id number]  D:  11/24/2021 6:00  T:  11/24/2021 8:29  JOB #:  [id number]  CC:   MD Nasreen Alvarenga MD

## 2021-12-09 ENCOUNTER — CLINICAL SUPPORT (OUTPATIENT)
Dept: PRIMARY CARE CLINIC | Age: 36
End: 2021-12-09
Payer: MEDICAID

## 2021-12-09 DIAGNOSIS — Z23 ENCOUNTER FOR IMMUNIZATION: Primary | ICD-10-CM

## 2021-12-09 PROCEDURE — 90686 IIV4 VACC NO PRSV 0.5 ML IM: CPT | Performed by: INTERNAL MEDICINE

## 2021-12-29 ENCOUNTER — OFFICE VISIT (OUTPATIENT)
Dept: PRIMARY CARE CLINIC | Age: 36
End: 2021-12-29
Payer: MEDICAID

## 2021-12-29 VITALS
RESPIRATION RATE: 15 BRPM | DIASTOLIC BLOOD PRESSURE: 83 MMHG | HEIGHT: 69 IN | OXYGEN SATURATION: 100 % | BODY MASS INDEX: 37 KG/M2 | HEART RATE: 66 BPM | WEIGHT: 249.8 LBS | SYSTOLIC BLOOD PRESSURE: 123 MMHG | TEMPERATURE: 98.4 F

## 2021-12-29 DIAGNOSIS — K60.2 ANAL FISSURE: ICD-10-CM

## 2021-12-29 DIAGNOSIS — F31.75 BIPOLAR DISORDER, IN PARTIAL REMISSION, MOST RECENT EPISODE DEPRESSED (HCC): ICD-10-CM

## 2021-12-29 DIAGNOSIS — R25.1 TREMOR OF BOTH HANDS: Primary | ICD-10-CM

## 2021-12-29 PROCEDURE — 99213 OFFICE O/P EST LOW 20 MIN: CPT | Performed by: INTERNAL MEDICINE

## 2021-12-29 NOTE — PROGRESS NOTES
Chief Complaint   Patient presents with    Follow-up     needing a referral for neuro       Visit Vitals  /83 (BP 1 Location: Left arm)   Pulse 66   Temp 98.4 °F (36.9 °C)   Resp 15   Ht 5' 9\" (1.753 m)   Wt 249 lb 12.8 oz (113.3 kg)   LMP 02/18/2015 Comment: bleeding consistently for 7 months now since 7/2014   SpO2 100%   BMI 36.89 kg/m²       1. Have you been to the ER, urgent care clinic since your last visit? Hospitalized since your last visit? No    2. Have you seen or consulted any other health care providers outside of the [address] [place] [address] since your last visit? Include any pap smears or colon screening.  No

## 2021-12-29 NOTE — PROGRESS NOTES
Roger Grimes (: 1985) is a 29 y.o. female, established patient, here for evaluation of the following chief complaint(s):  Follow-up (needing a referral for neuro)       ASSESSMENT/PLAN:  Below is the assessment and plan developed based on review of pertinent history, physical exam, labs, studies, and medications. 1. Tremor of both hands  -     REFERRAL TO NEUROLOGY  2. Anal fissure  Pain improved. Uses topical cream as needed. 3. Bipolar disorder, in partial remission, most recent episode depressed ([facility]   Followed by psychiatrist.  On Abilify injections only. SUBJECTIVE/OBJECTIVE:  HPI      Patient seen today for tremors in her both hands she has been having shakiness in her hands for last few months and lately she has noticed more when she is trying to text. For last few weeks she has been doing talk text. Her friend has also noticed tremors in her head. She had a proctoscopy  done and deep anal fissure was diagnosed. She has used the cream for few weeks as was recommended by the surgeon. She is feeling much better now anal pain has completely gone. She sees psychiatrist for bipolar disorder and missed her last appointment. She is feeling little agitated lately as she cannot get her Abilify injection before next week. She does get Abilify injections every 3 weeks. Patient Active Problem List   Diagnosis Code    Posttraumatic stress disorder F43.10    Bipolar disorder, in partial remission, most recent episode depressed ([facility] [address] F31.75    Asthma due to environmental allergies J45.909    S/P gastric surgery Z98.890        Current Outpatient Medications on File Prior to Visit   Medication Sig Dispense Refill    herbal drugs (FIBER DIET PO) Take  by mouth.  ARIPiprazole (Abilify Maintena) 400 mg sers 400 mg by IntraMUSCular route.  cyanocobalamin (VITAMIN B12) 1,000 mcg/mL injection 1,000 mcg by IntraMUSCular route once.  (Patient not taking: Reported on 2021)      albuterol (PROVENTIL HFA, VENTOLIN HFA, PROAIR HFA) 90 mcg/actuation inhaler 1 Puff. (Patient not taking: Reported on 11/23/2021)       No current facility-administered medications on file prior to visit. Allergies   Allergen Reactions    Bactrim [Sulfamethoprim Ds] Hives    Tape [Adhesive] Rash       Past Medical History:   Diagnosis Date    Anemia     Asthma     10 yrs old    Attention deficit disorder with hyperactivity(314.01) 10/31/2013    Back pain     Bipolar I disorder, most recent episode (or current) manic, unspecified 10/31/2013    [facility]    COVID-19 vaccine series completed     Moderna dose #1 received in 2/2021; dose #2 received in 3/2021    COVID-19 virus infection 08/13/2021    Depression 9/25/13    bipolar disorder    Diabetes ([address]     Essential hypertension     with first pregnacy    Inability to control urination     Joint pain     Mood disorder ([address] 9/25/2013    Morbid obesity ([address]     Muscle pain     PCOS (polycystic ovarian syndrome)     A1c 6.6 4/2015    Personality disorder ([address]      histrionic              Raimundo Mclean    Posttraumatic stress disorder 10/31/2013    Prediabetes 12/31/13    A1c 6.3 per lab   6.6 4/2015    Sinus complaint     Skin disease     Frequent boils    Somatization disorder 10/31/2013    Unspecified personality disorder 10/31/2013    Unspecified sleep apnea     childhood --had T&A done    Urination frequency        Past Surgical History:   Procedure Laterality Date    HX GYN  2013    BTL    HX GYN      D & C    HX HYSTERECTOMY  2/25/15    [facility] OTHER SURGICAL  08/31/2020    Laparoscopic gastric bypass; Dr. Christina Fuentes ([facility]     HX TONSILLECTOMY  2007       Family History   Problem Relation Age of Onset    Diabetes Maternal Grandmother     Lung Disease Maternal Grandfather     Cancer Paternal Grandmother         Pancreatic    Diabetes Father     Hypertension Father    [place] Problems Neg Hx        Social History     Socioeconomic History    Marital status:      Spouse name: Not on file    Number of children: Not on file    Years of education: Not on file    Highest education level: Not on file   Occupational History    Not on file   Tobacco Use    Smoking status: Never Smoker    Smokeless tobacco: Never Used   Vaping Use    Vaping Use: Never used   Substance and Sexual Activity    Alcohol use: No    Drug use: No    Sexual activity: Yes     Partners: Male     Birth control/protection: Condom   Other Topics Concern    Not on file   Social History Narrative    Not on file     Social Determinants of Health     Financial Resource Strain:     Difficulty of Paying Living Expenses: Not on file   Food Insecurity:     Worried About Running Out of Food in the Last Year: Not on file    Edith of Food in the Last Year: Not on file   Transportation Needs:     Lack of Transportation (Medical): Not on file    Lack of Transportation (Non-Medical):  Not on file   Physical Activity:     Days of Exercise per Week: Not on file    Minutes of Exercise per Session: Not on file   Stress:     Feeling of Stress : Not on file   Social Connections:     Frequency of Communication with Friends and Family: Not on file    Frequency of Social Gatherings with Friends and Family: Not on file    Attends [employer] Services: Not on file    Active Member of [address] [place] [address] [employer] or Organizations: Not on file    Attends Club or Organization Meetings: Not on file    Marital Status: Not on file   Intimate Partner Violence:     Fear of Current or Ex-Partner: Not on file    Emotionally Abused: Not on file    Physically Abused: Not on file    Sexually Abused: Not on file   Housing Stability:     Unable to Pay for Housing in the Last Year: Not on file    Number of [place] in the Last Year: Not on file    Unstable Housing in the Last Year: Not on file       Admission on 11/23/2021, Discharged on 11/23/2021   Component Date Value Ref Range Status    Glucose (POC) 11/23/2021 73  65 - 117 mg/dL Final    Comment: (NOTE)  The FDA has indicated that no capillary point of care blood glucose  monitoring systems are approved for use in \"critically ill\" patients,  however they have not defined this population. The College of  American Pathologists has recommended that these devices should not  be used in cases such as severe hypotension, dehydration, shock, and  hyperglycemic-hyperosmolar state, amongst others. Venous or arterial  collection is the recommended specimen for testing these patients.  Performed by 11/23/2021 ISHA Dominguez   Final    Glucose (POC) 11/23/2021 57* 65 - 117 mg/dL Final    Comment: (NOTE)  The FDA has indicated that no capillary point of care blood glucose  monitoring systems are approved for use in \"critically ill\" patients,  however they have not defined this population. The College of  American Pathologists has recommended that these devices should not  be used in cases such as severe hypotension, dehydration, shock, and  hyperglycemic-hyperosmolar state, amongst others. Venous or arterial  collection is the recommended specimen for testing these patients.  Performed by 11/23/2021 ISHA Dominguez   Final    Glucose (POC) 11/23/2021 76  65 - 117 mg/dL Final    Comment: (NOTE)  The FDA has indicated that no capillary point of care blood glucose  monitoring systems are approved for use in \"critically ill\" patients,  however they have not defined this population. The College of  American Pathologists has recommended that these devices should not  be used in cases such as severe hypotension, dehydration, shock, and  hyperglycemic-hyperosmolar state, amongst others. Venous or arterial  collection is the recommended specimen for testing these patients.       Performed by 11/23/2021 Briana Fernandez [facility] Outpatient Visit on 11/18/2021 Component Date Value Ref Range Status    Specimen source 11/18/2021 Nasopharyngeal    Final    SARS-CoV-2 11/18/2021 Not detected  NOTD   Final    Comment:      who meet COV                           [id number]/download       Methodology: RT-PCR       Review of Systems   Constitutional: Negative for appetite change and fatigue. Respiratory: Negative for chest tightness and shortness of breath. Gastrointestinal: Negative for abdominal pain, blood in stool and rectal pain. Genitourinary: Negative for difficulty urinating and urgency. Musculoskeletal: Negative for back pain and myalgias. Neurological: Positive for tremors. Negative for dizziness, facial asymmetry, weakness and numbness. Psychiatric/Behavioral: Positive for sleep disturbance. The patient is not nervous/anxious. Visit Vitals  /83 (BP 1 Location: Left arm)   Pulse 66   Temp 98.4 °F (36.9 °C)   Resp 15   Ht 5' 9\" (1.753 m)   Wt 249 lb 12.8 oz (113.3 kg)   LMP 02/18/2015 Comment: bleeding consistently for 7 months now since 7/2014   SpO2 100%   BMI 36.89 kg/m²       Physical Exam    Vitals and nursing note reviewed. Constitutional:       Appearance: Normal appearance. obese. Eyes:      Extraocular Movements: Extraocular movements intact. Pupils: Pupils are equal, round, and reactive to light. Cardiovascular:      Rate and Rhythm: Normal rate and regular rhythm. Pulmonary:      Effort: Pulmonary effort is normal.      Breath sounds: Normal breath sounds. Abdominal:      General: Bowel sounds are normal. There is no distension. Palpations: Abdomen is soft. Musculoskeletal:         General: No swelling. Normal range of motion. Cervical back: Normal range of motion and neck supple. Right lower leg: No edema. Left lower leg: No edema. Neurological:      General: No resting tremors      Mental Status:  Alert and oriented to person, place, and time. Motor: No weakness.    Psychiatric:         Mood and Affect: Mood normal.         Behavior: Behavior normal.         An electronic signature was used to authenticate this note.   -- Brigitte Harmon MD

## 2022-03-03 ENCOUNTER — TELEPHONE (OUTPATIENT)
Dept: NEUROLOGY | Age: 37
End: 2022-03-03

## 2022-03-03 ENCOUNTER — OFFICE VISIT (OUTPATIENT)
Dept: NEUROLOGY | Age: 37
End: 2022-03-03
Payer: MEDICAID

## 2022-03-03 VITALS
HEIGHT: 69 IN | DIASTOLIC BLOOD PRESSURE: 80 MMHG | SYSTOLIC BLOOD PRESSURE: 138 MMHG | BODY MASS INDEX: 36.88 KG/M2 | HEART RATE: 88 BPM | WEIGHT: 249 LBS | RESPIRATION RATE: 20 BRPM

## 2022-03-03 DIAGNOSIS — G25.71 DRUG INDUCED AKATHISIA: Primary | ICD-10-CM

## 2022-03-03 PROCEDURE — 99203 OFFICE O/P NEW LOW 30 MIN: CPT | Performed by: PSYCHIATRY & NEUROLOGY

## 2022-03-03 RX ORDER — TOPIRAMATE 25 MG/1
25 TABLET ORAL
COMMUNITY

## 2022-03-03 NOTE — PROGRESS NOTES
Neurology Consult Note      HISTORY PROVIDED BY: patient    Chief Complaint:   Chief Complaint   Patient presents with    Neurologic Problem      Subjective:    Jamal Arceo is a 29 y.o. right handed female who presents in consultation for full body tremors. Onset about a year ago. Initially, noticed in right leg at night in bed, uncontrollable shaking. Then noticed it in right arm and has trouble writing. Her friend noticed her head shaking while doing her hair. Then noticed all over body starting 6 months ago. She is receiving Abilify injections for bipolar d/o, psychiatrist is Rosa Acosta at [facility]. Past Medical History:   Diagnosis Date    Anemia     Asthma     10 yrs old    Attention deficit disorder with hyperactivity(314.01) 10/31/2013    Bipolar I disorder, most recent episode (or current) manic, unspecified 10/31/2013    [facility] COVID-19 vaccine series completed     Moderna dose #1 received in 2/2021; dose #2 received in 3/2021    COVID-19 virus infection 08/13/2021    History of hypertension     with first pregnacy    Inability to control urination     Morbid obesity ([address]     ELVIRA (obstructive sleep apnea)     Not on CPAP    PCOS (polycystic ovarian syndrome)     A1c 6.6 4/2015    Personality disorder ([address]      histrionic              Ashley Bold    Posttraumatic stress disorder 10/31/2013    Prediabetes     Prior to gastric bypass    PUD (peptic ulcer disease)     Skin disease     Frequent boils    Somatization disorder 10/31/2013    Unspecified personality disorder 10/31/2013      Past Surgical History:   Procedure Laterality Date    HX GYN  2013    BTL    HX GYN      D & C    HX HYSTERECTOMY  2/25/15    [facility] at [facility] HX OTHER SURGICAL  08/31/2020    Laparoscopic gastric [place]; [place] Jesse Odell [place]     HX TONSILLECTOMY  2007      Social History     Socioeconomic History    Marital status:      Spouse name: Not on file    Number of children: Not on file    Years of education: Not on file    Highest education level: Not on file   Occupational History    Occupation: [employer] for [employer] [address]   Tobacco Use    Smoking status: Never Smoker    Smokeless tobacco: Never Used   Vaping Use    Vaping Use: Never used   Substance and Sexual Activity    Alcohol use: No    Drug use: No    Sexual activity: Yes     Partners: Male     Birth control/protection: Condom   Other Topics Concern    Not on file   Social History Narrative    Lives in [place] with Mom and 2 sons     Social Determinants of Health     Financial Resource Strain:     Difficulty of Paying Living Expenses: Not on file   Food Insecurity:     Worried About Running Out of Food in the Last Year: Not on file    Edith of Food in the Last Year: Not on file   Transportation Needs:     Lack of Transportation (Medical): Not on file    Lack of Transportation (Non-Medical):  Not on file   Physical Activity:     Days of Exercise per Week: Not on file    Minutes of Exercise per Session: Not on file   Stress:     Feeling of Stress : Not on file   Social Connections:     Frequency of Communication with Friends and Family: Not on file    Frequency of Social Gatherings with Friends and Family: Not on file    Attends [employer] Services: Not on file    Active Member of [address] [place] [address] [employer] or Organizations: Not on file    Attends Club or Organization Meetings: Not on file    Marital Status: Not on file   Intimate Partner Violence:     Fear of Current or Ex-Partner: Not on file    Emotionally Abused: Not on file    Physically Abused: Not on file    Sexually Abused: Not on file   Housing Stability:     Unable to Pay for Housing in the Last Year: Not on file    Number of [place] in the Last Year: Not on file    Unstable Housing in the Last Year: Not on file     Family History   Problem Relation Age of Onset    Diabetes Maternal Grandmother  Lung Disease Maternal Grandfather     Cancer Paternal Grandmother         Pancreatic    Diabetes Mother     Hypertension Mother     Diabetes Father     Hypertension Father     No Known Problems Brother     No Known Problems Brother     No Known Problems Brother     No Known Problems Brother     No Known Problems Son     No Known Problems Son     Anesth Problems Neg Hx          Objective:   Review of Systems   Constitutional: Positive for malaise/fatigue. Skin: Positive for rash. Neurological: Positive for headaches. Psychiatric/Behavioral: Positive for depression. The patient is nervous/anxious. All other systems reviewed and are negative. Allergies   Allergen Reactions    Bactrim [Sulfamethoprim Ds] Hives    Tape [Adhesive] Rash        Meds:    Current Outpatient Medications:     topiramate (TOPAMAX) 25 mg tablet, Take 25 mg by mouth daily (with breakfast). , Disp: , Rfl:     ARIPiprazole (Abilify Maintena) 400 mg sers, 400 mg by IntraMUSCular route., Disp: , Rfl:     albuterol (PROVENTIL HFA, VENTOLIN HFA, PROAIR HFA) 90 mcg/actuation inhaler, 1 Puff., Disp: , Rfl:     Imaging:  MRI Results (most recent):  No results found for this or any previous visit. CT Results (most recent):  No results found for this or any previous visit. Reviewed records in [facility] and [facility] tab today    Lab Review   Results for orders placed or performed in visit on 03/01/22   NOVEL CORONAVIRUS (COVID-19)   Result Value Ref Range    SARS-CoV-2, ARON Positive     8/13/21    Exam:  Visit Vitals  /80   Pulse 88   Resp 20   Ht 5' 9\" (1.753 m)   Wt 249 lb (112.9 kg)   LMP 02/18/2015 Comment: bleeding consistently for 7 months now since 7/2014   BMI 36.77 kg/m²     General:  Alert, cooperative, no distress. Head:  Normocephalic, without obvious abnormality, atraumatic.    Respiratory:  Heart:   Non labored breathing  Regular rate and rhythm, no murmurs   Neck:   2+ carotids, no bruits Extremities: Warm, no cyanosis or edema. Pulses: 2+ radial pulses. Neurologic:  MS: Alert and oriented x 4, speech intact. Language intact. Attention and fund of knowledge appropriate. Recent and remote memory intact. Cranial Nerves:  II: visual fields Full to confrontation   II: pupils Equal, round, reactive to light   II: optic disc    III,VII: ptosis none   III,IV,VI: extraocular muscles  EOMI, no nystagmus or diplopia   V: facial light touch sensation  normal   VII: facial muscle function   symmetric   VIII: hearing intact   IX: soft palate elevation  normal   XI: trapezius strength  5/5   XI: sternocleidomastoid strength 5/5   XII: tongue  Midline     Motor: normal bulk and tone, Strength: 5/5 throughout, no PD, pt is in constant motion, like intentional leg shaking, but involving all extremities and involuntary, stops with movement, but quickly starts again when resting. Sensory: intact to LT, PP  Coordination: FTN and HTS intact, VIET intact  Gait: normal gait, able to tandem walk  Reflexes: 2+ symmetric, toes downgoing       Assessment/Plan   Pt is a 29 y.o. right handed female with involuntary shaking of all extremities, pauses with volitional movement, but quickly restarts once at rest. Exam is non-focal and unremarkable. Findings c/w drug-induced akathisia from Abilify. Recommended she talk to her psychiatrist as soon as possible about tapering down or coming off of Abilify. If unable to stop abilify or switch to another medication, then consider trial of propranolol, benztropine, or a benzodiazepine. Unfortunately, akathisia is often refractory to treatment. F/u in neurology as needed. ICD-10-CM ICD-9-CM    1. Drug induced akathisia  G25.71 333.99      E980.5        Signed:   Cheo Brown MD  3/3/2022

## 2022-03-03 NOTE — TELEPHONE ENCOUNTER
----- Message from Jennifer Griggs MD sent at 3/3/2022  1:57 PM EST -----  Please send visit note to [facility], Marjorie Garcia.

## 2022-03-03 NOTE — PROGRESS NOTES
Ms. Chandana Ortiz presents as a new patient for evaluation of tremor throughout body. Symptom onset approximately one year ago.

## 2022-03-03 NOTE — LETTER
3/3/2022    Patient: Julia Alexander   YOB: 1985   Date of Visit: 3/3/2022     Thu Lawson MD  [address] [place] [address]  [facility] [address]    Dear Thu Lawson MD,      Thank you for referring Ms. Julia Alexander to [address] [place] [address] for evaluation. My notes for this consultation are attached. If you have questions, please do not hesitate to call me. I look forward to following your patient along with you.       Sincerely,    Norah Koo MD

## 2022-03-19 PROBLEM — Z98.890 S/P GASTRIC SURGERY: Status: ACTIVE | Noted: 2021-09-10

## 2022-03-19 PROBLEM — J45.909 ASTHMA DUE TO ENVIRONMENTAL ALLERGIES: Status: ACTIVE | Noted: 2019-09-23

## 2022-03-19 PROBLEM — F31.75 BIPOLAR DISORDER, IN PARTIAL REMISSION, MOST RECENT EPISODE DEPRESSED (HCC): Status: ACTIVE | Noted: 2019-05-22

## 2022-04-04 ENCOUNTER — OFFICE VISIT (OUTPATIENT)
Dept: PRIMARY CARE CLINIC | Age: 37
End: 2022-04-04
Payer: MEDICAID

## 2022-04-04 VITALS
HEART RATE: 71 BPM | TEMPERATURE: 98.4 F | SYSTOLIC BLOOD PRESSURE: 114 MMHG | RESPIRATION RATE: 15 BRPM | WEIGHT: 252.4 LBS | OXYGEN SATURATION: 97 % | DIASTOLIC BLOOD PRESSURE: 78 MMHG | HEIGHT: 69 IN | BODY MASS INDEX: 37.38 KG/M2

## 2022-04-04 DIAGNOSIS — G25.1 DRUG-INDUCED TREMOR: ICD-10-CM

## 2022-04-04 DIAGNOSIS — Z98.890 S/P GASTRIC SURGERY: ICD-10-CM

## 2022-04-04 DIAGNOSIS — E55.9 VITAMIN D DEFICIENCY: ICD-10-CM

## 2022-04-04 DIAGNOSIS — J45.909 ASTHMA DUE TO ENVIRONMENTAL ALLERGIES: Primary | ICD-10-CM

## 2022-04-04 DIAGNOSIS — Z98.890 S/P BILATERAL CARPAL TUNNEL RELEASE: ICD-10-CM

## 2022-04-04 DIAGNOSIS — E66.01 SEVERE OBESITY (HCC): ICD-10-CM

## 2022-04-04 PROCEDURE — 99213 OFFICE O/P EST LOW 20 MIN: CPT | Performed by: INTERNAL MEDICINE

## 2022-04-04 RX ORDER — UREA 10 %
100 LOTION (ML) TOPICAL DAILY
COMMUNITY

## 2022-04-04 RX ORDER — GLUCOSAMINE/CHONDR SU A SOD 750-600 MG
TABLET ORAL
COMMUNITY

## 2022-04-04 RX ORDER — BENZTROPINE MESYLATE 1 MG/1
TABLET ORAL
COMMUNITY
Start: 2022-03-24

## 2022-04-04 RX ORDER — ALBUTEROL SULFATE 90 UG/1
1 AEROSOL, METERED RESPIRATORY (INHALATION)
Qty: 18 G | Refills: 0 | Status: SHIPPED | OUTPATIENT
Start: 2022-04-04 | End: 2022-05-04

## 2022-04-04 RX ORDER — PANTOPRAZOLE SODIUM 40 MG/1
TABLET, DELAYED RELEASE ORAL
COMMUNITY
Start: 2022-03-30

## 2022-04-04 NOTE — PROGRESS NOTES
Chief Complaint   Patient presents with    Follow-up       Visit Vitals  /78 (BP 1 Location: Left arm)   Pulse 71   Temp 98.4 °F (36.9 °C)   Resp 15   Ht 5' 9\" (1.753 m)   Wt 252 lb 6.4 oz (114.5 kg)   LMP 02/18/2015   SpO2 97%   BMI 37.27 kg/m²       1. Have you been to the ER, urgent care clinic since your last visit? Hospitalized since your last visit? No    2. Have you seen or consulted any other health care providers outside of the [address] [place] [address] since your last visit? Include any pap smears or colon screening. Yes Bariatric [facility]      3. For patients aged 39-70: Has the patient had a colonoscopy / FIT/ Cologuard? No      If the patient is female:    4. For patients aged 41-77: Has the patient had a mammogram within the past 2 years? NA - based on age or sex      11. For patients aged 21-65: Has the patient had a pap smear?  Yes - no [place] present

## 2022-04-04 NOTE — PROGRESS NOTES
Ami Echevarria (: 1985) is a 29 y.o. female, established patient, here for evaluation of the following chief complaint(s):  Follow-up     Written by Bunny Avila, as dictated by Dr. Dimitris Bradford MD.      ASSESSMENT/PLAN:  Below is the assessment and plan developed based on review of pertinent history, physical exam, labs, studies, and medications. 1. Asthma due to environmental allergies  I refilled her PRN albuterol inhaler. -     albuterol (PROVENTIL HFA, VENTOLIN HFA, PROAIR HFA) 90 mcg/actuation inhaler; Take 1 Puff by inhalation every six (6) hours as needed for Wheezing for up to 30 days. , Normal, Disp-18 g, R-0 sent to pharmacy. 2. Drug-induced tremor  Followed by Dr. Adiel Fischer (neurology) and [facility] with [facility]. She has been working with her psychiatrist to come off Abilify. 3. Vitamin D deficiency  Continue on vitamin d supplement. 4. S/P gastric surgery  Explained that she needs to work on increasing activity and exercise. Recommend the patient buy a FitBit to monitor steps. Explained that she should be walking 5,000 steps daily to maintain conditioning and weight and increase to at least 10,000 steps to work on losing weight. 5. S/p bilateral carpal tunnel release  Followed by Dr. Daily De Los Santos (orthopedics). She has been recovering well. SUBJECTIVE/OBJECTIVE:  HPI   The patient presents today for a routine follow-up. She is requesting a refill of PRN albuterol inhaler for asthma. She is s/p gastric surgery. She has gained weight from 249 lb on 22 to 252 lb today. She continues to follow-up with bariatric surgery. She does not do any exercise. She was rx'd vitamin d supplement once a week by bariatric surgery for vitamin d deficiency. Also on vitamin b12 supplements. She saw Dr. Adiel Fischer (neurology) for tremors and dx'd with drug induced akathisia from [facility]. She has been working with her psychiatrist to come off Abilify.      She is s/p left carpal tunnel release 2 weeks ago. Followed by Ortho. Patient Active Problem List   Diagnosis Code    Posttraumatic stress disorder F43.10    Bipolar disorder, in partial remission, most recent episode depressed ([facility] F31.75    Asthma due to environmental allergies J45.909    S/P gastric surgery Z98.890        Current Outpatient Medications on File Prior to Visit   Medication Sig Dispense Refill    benztropine (COGENTIN) 1 mg tablet       Biotin 2,500 mcg cap Take  by mouth.  pantoprazole (PROTONIX) 40 mg tablet TAKE 1 TABLET BY MOUTH TWICE DAILY DO NOT CRUSH, CHEW, OR SPLIT      cyanocobalamin (VITAMIN B12) 100 mcg tablet Take 100 mcg by mouth daily.  topiramate (TOPAMAX) 25 mg tablet Take 25 mg by mouth daily (with breakfast).  ARIPiprazole (Abilify Maintena) 400 mg sers 300 mg by IntraMUSCular route.  [DISCONTINUED] albuterol (PROVENTIL HFA, VENTOLIN HFA, PROAIR HFA) 90 mcg/actuation inhaler 1 Puff. No current facility-administered medications on file prior to visit.        Allergies   Allergen Reactions    Bactrim [Sulfamethoprim Ds] Hives    Tape [Adhesive] Rash       Past Medical History:   Diagnosis Date    Akathisia     Anemia     Asthma     10 yrs old    Attention deficit disorder with hyperactivity(314.01) 10/31/2013    Bipolar I disorder, most recent episode (or current) manic, unspecified 10/31/2013    [facility] COVID-19 vaccine series completed     Moderna dose #1 received in 2/2021; dose #2 received in 3/2021    COVID-19 virus infection 08/13/2021    History of hypertension     with first pregnacy    Inability to control urination     Morbid obesity ([facility]     ELVIRA (obstructive sleep apnea)     Not on CPAP    PCOS (polycystic ovarian syndrome)     A1c 6.6 4/2015    Personality disorder ([facility]      histrionic              Leonides Elijah    Posttraumatic stress disorder 10/31/2013    Prediabetes     Prior to gastric bypass    PUD (peptic ulcer disease)     Skin disease     Frequent boils    Somatization disorder 10/31/2013    Unspecified personality disorder 10/31/2013       Past Surgical History:   Procedure Laterality Date    HX CARPAL TUNNEL RELEASE      3/2022    HX GYN  2013    BTL    HX GYN      D & C    HX HYSTERECTOMY  2/25/15    Spasic at [facility]  08/31/2020    Laparoscopic gastric bypass; Dr. Harvey Rivas ([facility]  HX TONSILLECTOMY  2007       Family History   Problem Relation Age of Onset    Diabetes Maternal Grandmother     Lung Disease Maternal Grandfather     Cancer Paternal Grandmother         Pancreatic    Diabetes Mother     Hypertension Mother     Diabetes Father     Hypertension Father     No Known Problems Brother     No Known Problems Brother     No Known Problems Brother     No Known Problems Brother     No Known Problems Son     No Known Problems Son     Anesth Problems Neg Hx        Social History     Socioeconomic History    Marital status:      Spouse name: Not on file    Number of children: Not on file    Years of education: Not on file    Highest education level: Not on file   Occupational History    Occupation: [employer] [address]   Tobacco Use    Smoking status: Never Smoker    Smokeless tobacco: Never Used   Vaping Use    Vaping Use: Never used   Substance and Sexual Activity    Alcohol use: No    Drug use: No    Sexual activity: Yes     Partners: Male     Birth control/protection: Condom   Other Topics Concern    Not on file   Social History Narrative    Lives in [place] with Mom and 2 sons       Abstract on 03/01/2022   Component Date Value Ref Range Status    SARS-CoV-2, ARON 08/13/2021 Positive   Final      Review of Systems   Constitutional: Negative for activity change, fatigue and unexpected weight change. HENT: Negative for congestion, hearing loss, rhinorrhea and sore throat.     Eyes: Negative for discharge. Respiratory: Negative for cough, chest tightness and shortness of breath. Cardiovascular: Negative for leg swelling. Gastrointestinal: Negative for abdominal pain, constipation and diarrhea. Genitourinary: Negative for dysuria, flank pain, frequency and urgency. Musculoskeletal: Negative for arthralgias, back pain and myalgias. Skin: Negative for color change and rash. Neurological: Negative for dizziness, light-headedness and headaches. Psychiatric/Behavioral: Negative for dysphoric mood and sleep disturbance. The patient is not nervous/anxious. Visit Vitals  /78 (BP 1 Location: Left arm)   Pulse 71   Temp 98.4 °F (36.9 °C)   Resp 15   Ht 5' 9\" (1.753 m)   Wt 252 lb 6.4 oz (114.5 kg)   LMP 02/18/2015   SpO2 97%   BMI 37.27 kg/m²      Physical Exam  Vitals and nursing note reviewed. Constitutional:       General: She is not in acute distress. Appearance: Normal appearance. She is well-developed. She is not diaphoretic. HENT:      Right Ear: External ear normal.      Left Ear: External ear normal.   Eyes:      General: No scleral icterus. Right eye: No discharge. Left eye: No discharge. Extraocular Movements: Extraocular movements intact. Conjunctiva/sclera: Conjunctivae normal.   Cardiovascular:      Rate and Rhythm: Normal rate and regular rhythm. Pulmonary:      Effort: Pulmonary effort is normal.      Breath sounds: Normal breath sounds. No wheezing. Abdominal:      General: Bowel sounds are normal.      Palpations: Abdomen is soft. Tenderness: There is no abdominal tenderness. Musculoskeletal:      Cervical back: Normal range of motion and neck supple. Lymphadenopathy:      Cervical: No cervical adenopathy. Neurological:      Mental Status: She is alert and oriented to person, place, and time.    Psychiatric:         Mood and Affect: Mood and affect normal.       An electronic signature was used to authenticate this note.   -- Lyubov Haro

## 2022-05-11 ENCOUNTER — VIRTUAL VISIT (OUTPATIENT)
Dept: PRIMARY CARE CLINIC | Age: 37
End: 2022-05-11
Payer: MEDICAID

## 2022-05-11 DIAGNOSIS — H92.01 RIGHT EAR PAIN: ICD-10-CM

## 2022-05-11 DIAGNOSIS — K21.9 GASTROESOPHAGEAL REFLUX DISEASE WITHOUT ESOPHAGITIS: ICD-10-CM

## 2022-05-11 DIAGNOSIS — G51.0 RIGHT-SIDED BELL'S PALSY: Primary | ICD-10-CM

## 2022-05-11 PROCEDURE — 99213 OFFICE O/P EST LOW 20 MIN: CPT | Performed by: INTERNAL MEDICINE

## 2022-05-11 RX ORDER — VALACYCLOVIR HYDROCHLORIDE 500 MG/1
500 TABLET, FILM COATED ORAL 2 TIMES DAILY
Qty: 10 TABLET | Refills: 0 | Status: SHIPPED | OUTPATIENT
Start: 2022-05-11 | End: 2022-05-16

## 2022-05-11 NOTE — PROGRESS NOTES
Silvana Gosselin (: 1985) is a 29 y.o. female, established patient, here for evaluation of the following chief complaint(s):   Facial Droop     Written by Cordelia Hawkins, as dictated by Dr. Kashif Ulrich MD.      ASSESSMENT/PLAN:  Below is the assessment and plan developed based on review of pertinent history, labs, studies, and medications. 1. Right-sided Bell's palsy  I rx'd Valtrex 500 mg BID x5 days. Potential side effects were discussed. Continue with prednisone and eye drops. Recommend she cover her right eye at night to prevent dryness. Discussed that symptoms should improve in the next 3 weeks. -     valACYclovir (VALTREX) 500 mg tablet; Take 1 Tablet by mouth two (2) times a day for 5 days. , Normal, Disp-10 Tablet, R-0 sent to pharmacy. 2. Right ear pain  Secondary to Bell's palsy. I rx'd Valtrex 500 mg BID x5 days. Potential side effects were discussed. -     valACYclovir (VALTREX) 500 mg tablet; Take 1 Tablet by mouth two (2) times a day for 5 days. , Normal, Disp-10 Tablet, R-0 sent to pharmacy. 3. Gastroesophageal reflux disease without esophagitis  Continue with pantoprazole 40 mg daily. Discussed that use of prednisone can exacerbate reflux symptoms. SUBJECTIVE/OBJECTIVE:  HPI   The patient presents today to discuss new dx of Bell's palsy. States that 3 days ago she woke up with facial drooping and thought she was having a mini stroke. She went to the ED and dx'd with Bell's palsy. She was started on prednisone and eye drops. The Bell's palsy affects her right side and she is unable to close her right eye fully. She has trouble chewing on her right side. Today she notes having fullness and pain in her right ear. Describes the pain as a sharp, stabbing pain.      Patient Active Problem List   Diagnosis Code    Posttraumatic stress disorder F43.10    Bipolar disorder, in partial remission, most recent episode depressed ([facility] [address] F31.75    Asthma due to environmental allergies J45.909    S/P gastric surgery Z98.890        Current Outpatient Medications on File Prior to Visit   Medication Sig Dispense Refill    benztropine (COGENTIN) 1 mg tablet       Biotin 2,500 mcg cap Take  by mouth.  pantoprazole (PROTONIX) 40 mg tablet TAKE 1 TABLET BY MOUTH TWICE DAILY DO NOT CRUSH, CHEW, OR SPLIT      cyanocobalamin (VITAMIN B12) 100 mcg tablet Take 100 mcg by mouth daily.  topiramate (TOPAMAX) 25 mg tablet Take 25 mg by mouth daily (with breakfast).  ARIPiprazole (Abilify Maintena) 400 mg sers 300 mg by IntraMUSCular route. No current facility-administered medications on file prior to visit.        Allergies   Allergen Reactions    Bactrim [Sulfamethoprim Ds] Hives    Tape [Adhesive] Rash       Past Medical History:   Diagnosis Date    Akathisia     Anemia     Asthma     10 yrs old    Attention deficit disorder with hyperactivity(314.01) 10/31/2013    Bipolar I disorder, most recent episode (or current) manic, unspecified 10/31/2013    [facility] COVID-19 vaccine series completed     Moderna dose #1 received in 2/2021; dose #2 received in 3/2021    COVID-19 virus infection 08/13/2021    History of hypertension     with first pregnacy    Inability to control urination     Morbid obesity ([address]     ELVIRA (obstructive sleep apnea)     Not on CPAP    PCOS (polycystic ovarian syndrome)     A1c 6.6 4/2015    Personality disorder ([address]      histrionic              Adrinaa Mantle    Posttraumatic stress disorder 10/31/2013    Prediabetes     Prior to gastric bypass    PUD (peptic ulcer disease)     Skin disease     Frequent boils    Somatization disorder 10/31/2013    Unspecified personality disorder 10/31/2013       Past Surgical History:   Procedure Laterality Date    HX CARPAL TUNNEL RELEASE      3/2022    HX GYN  2013    BTL    HX GYN      D & C    HX HYSTERECTOMY  2/25/15    Spasic at [address] [facility]  08/31/2020 Laparoscopic gastric bypass; Dr. Jesús Rodriguez ([facility]).  HX TONSILLECTOMY  2007       Family History   Problem Relation Age of Onset    Diabetes Maternal Grandmother     Lung Disease Maternal Grandfather     Cancer Paternal Grandmother         Pancreatic    Diabetes Mother     Hypertension Mother     Diabetes Father     Hypertension Father     No Known Problems Brother     No Known Problems Brother     No Known Problems Brother     No Known Problems Brother     No Known Problems Son     No Known Problems Son     Anesth Problems Neg Hx        Social History     Socioeconomic History    Marital status:      Spouse name: Not on file    Number of children: Not on file    Years of education: Not on file    Highest education level: Not on file   Occupational History    Occupation: [employer] for [employer] [address]   Tobacco Use    Smoking status: Never Smoker    Smokeless tobacco: Never Used   Vaping Use    Vaping Use: Never used   Substance and Sexual Activity    Alcohol use: No    Drug use: No    Sexual activity: Yes     Partners: Male     Birth control/protection: Condom   Other Topics Concern    Not on file   Social History Narrative    Lives in [place] with Mom and 2 sons       Abstract on 03/01/2022   Component Date Value Ref Range Status    SARS-CoV-2, ARON 08/13/2021 Positive   Final      Review of Systems   Constitutional: Negative for activity change, fatigue and unexpected weight change. HENT: Positive for ear pain. Negative for congestion, hearing loss, rhinorrhea and sore throat. Eyes: Negative for discharge. Respiratory: Negative for cough, chest tightness and shortness of breath. Cardiovascular: Negative for leg swelling. Gastrointestinal: Negative for abdominal pain, constipation and diarrhea. Genitourinary: Negative for dysuria, flank pain, frequency and urgency. Musculoskeletal: Negative for arthralgias, back pain and myalgias.    Skin: Negative for color change and rash. Neurological: Positive for facial asymmetry. Negative for dizziness, light-headedness and headaches. Psychiatric/Behavioral: Negative for dysphoric mood and sleep disturbance. The patient is not nervous/anxious. Physical Exam    [INSTRUCTIONS:  \"[x]\" Indicates a positive item  \"[]\" Indicates a negative item  -- DELETE ALL ITEMS NOT EXAMINED]    Constitutional: [x] Appears well-developed and well-nourished [x] No apparent distress      [] Abnormal -     Mental status: [x] Alert and awake  [x] Oriented to person/place/time [x] Able to follow commands    [] Abnormal -     Eyes:   EOM    [x]  Normal    [] Abnormal -   Sclera  [x]  Normal    [] Abnormal -          Discharge [x]  None visible   [] Abnormal -     HENT: [x] Normocephalic, atraumatic  [] Abnormal -   [x] Mouth/Throat: Mucous membranes are moist    External Ears [x] Normal  [] Abnormal -    Neck: [x] No visualized mass [] Abnormal -     Pulmonary/Chest: [x] Respiratory effort normal   [x] No visualized signs of difficulty breathing or respiratory distress        [] Abnormal -      Musculoskeletal:   [x] Normal gait with no signs of ataxia         [x] Normal range of motion of neck        [] Abnormal -     Neurological:                 [x] No gaze palsy        [x] Abnormal -  Facial droop on the right side        Skin:        [x] No significant exanthematous lesions or discoloration noted on facial skin         [] Abnormal -            Psychiatric:       [x] Normal Affect [] Abnormal -        [x] No Hallucinations    Other pertinent observable physical exam findings:-    Fern Azevedo, was evaluated through a synchronous (real-time) audio-video encounter. The patient (or guardian if applicable) is aware that this is a billable service, which includes applicable co-pays. Verbal consent to proceed has been obtained.  The visit was conducted pursuant to the emergency declaration under the [address] and the National Emergencies Act, [facility] waiver authority and the [employer] and [employer] General Act. Patient identification was verified, and a caregiver was present when appropriate. The patient was located at home in a state where the provider was licensed to provide care. An electronic signature was used to authenticate this note.   -- Sean Gusman

## 2023-01-10 ENCOUNTER — TELEPHONE (OUTPATIENT)
Dept: PRIMARY CARE CLINIC | Age: 38
End: 2023-01-10

## 2023-01-10 NOTE — TELEPHONE ENCOUNTER
Ear pain is starting to get bad, ears are popping and draining as well- worse when laying down  Started roughly two days ago, patient is asking for an appointment but I told her currently we are full so I will see if there is another recommendation at this time or if we are able to work her in.

## 2023-01-10 NOTE — TELEPHONE ENCOUNTER
Called patient back- she is going to do a Home Covid test per Dr Jamie Loza and let us know via Smartjog message and then we will make an appointment for tomorrow

## 2023-01-10 NOTE — TELEPHONE ENCOUNTER
Patient was transferred from [facility] ([facility]), she has double ear pain and needs to be seen soon.

## 2023-02-27 ENCOUNTER — APPOINTMENT (OUTPATIENT)
Dept: CT IMAGING | Age: 38
End: 2023-02-27
Attending: EMERGENCY MEDICINE
Payer: MEDICAID

## 2023-02-27 ENCOUNTER — HOSPITAL ENCOUNTER (EMERGENCY)
Age: 38
Discharge: HOME | End: 2023-02-27
Attending: EMERGENCY MEDICINE
Payer: MEDICAID

## 2023-02-27 VITALS
TEMPERATURE: 98.4 F | HEART RATE: 62 BPM | DIASTOLIC BLOOD PRESSURE: 66 MMHG | SYSTOLIC BLOOD PRESSURE: 108 MMHG | WEIGHT: 252.43 LBS | OXYGEN SATURATION: 100 % | HEIGHT: 68 IN | RESPIRATION RATE: 16 BRPM | BODY MASS INDEX: 38.26 KG/M2

## 2023-02-27 DIAGNOSIS — K29.90 GASTRITIS AND DUODENITIS: Primary | ICD-10-CM

## 2023-02-27 LAB
ALBUMIN SERPL-MCNC: 3.6 G/DL (ref 3.5–5)
ALBUMIN/GLOB SERPL: 1.1 (ref 1.1–2.2)
ALP SERPL-CCNC: 94 U/L (ref 45–117)
ALT SERPL-CCNC: 11 U/L (ref 12–78)
ANION GAP SERPL CALC-SCNC: 9 MMOL/L (ref 5–15)
APPEARANCE UR: CLEAR
AST SERPL-CCNC: 15 U/L (ref 15–37)
BACTERIA URNS QL MICRO: NEGATIVE /HPF
BASOPHILS # BLD: 0 K/UL (ref 0–0.1)
BASOPHILS NFR BLD: 1 % (ref 0–1)
BILIRUB SERPL-MCNC: 0.4 MG/DL (ref 0.2–1)
BILIRUB UR QL: NEGATIVE
BUN SERPL-MCNC: 8 MG/DL (ref 6–20)
BUN/CREAT SERPL: 11 (ref 12–20)
CALCIUM SERPL-MCNC: 8.4 MG/DL (ref 8.5–10.1)
CHLORIDE SERPL-SCNC: 104 MMOL/L (ref 97–108)
CO2 SERPL-SCNC: 27 MMOL/L (ref 21–32)
COLOR UR: ABNORMAL
CREAT SERPL-MCNC: 0.71 MG/DL (ref 0.55–1.02)
DIFFERENTIAL METHOD BLD: ABNORMAL
EOSINOPHIL # BLD: 0.2 K/UL (ref 0–0.4)
EOSINOPHIL NFR BLD: 4 % (ref 0–7)
EPITH CASTS URNS QL MICRO: ABNORMAL /LPF
ERYTHROCYTE [DISTWIDTH] IN BLOOD BY AUTOMATED COUNT: 13.9 % (ref 11.5–14.5)
GLOBULIN SER CALC-MCNC: 3.4 G/DL (ref 2–4)
GLUCOSE SERPL-MCNC: 81 MG/DL (ref 65–100)
GLUCOSE UR STRIP.AUTO-MCNC: NEGATIVE MG/DL
HCT VFR BLD AUTO: 38.8 % (ref 35–47)
HGB BLD-MCNC: 12.2 G/DL (ref 11.5–16)
HGB UR QL STRIP: NEGATIVE
IMM GRANULOCYTES # BLD AUTO: 0 K/UL (ref 0–0.04)
IMM GRANULOCYTES NFR BLD AUTO: 0 % (ref 0–0.5)
KETONES UR QL STRIP.AUTO: NEGATIVE MG/DL
LEUKOCYTE ESTERASE UR QL STRIP.AUTO: NEGATIVE
LIPASE SERPL-CCNC: 50 U/L (ref 73–393)
LYMPHOCYTES # BLD: 2.4 K/UL (ref 0.8–3.5)
LYMPHOCYTES NFR BLD: 46 % (ref 12–49)
MCH RBC QN AUTO: 24.4 PG (ref 26–34)
MCHC RBC AUTO-ENTMCNC: 31.4 G/DL (ref 30–36.5)
MCV RBC AUTO: 77.6 FL (ref 80–99)
MONOCYTES # BLD: 0.3 K/UL (ref 0–1)
MONOCYTES NFR BLD: 6 % (ref 5–13)
MUCOUS THREADS URNS QL MICRO: ABNORMAL /LPF
NEUTS SEG # BLD: 2.3 K/UL (ref 1.8–8)
NEUTS SEG NFR BLD: 43 % (ref 32–75)
NITRITE UR QL STRIP.AUTO: NEGATIVE
NRBC # BLD: 0 K/UL (ref 0–0.01)
NRBC BLD-RTO: 0 PER 100 WBC
PH UR STRIP: 7 (ref 5–8)
PLATELET # BLD AUTO: 238 K/UL (ref 150–400)
PMV BLD AUTO: 10.1 FL (ref 8.9–12.9)
POTASSIUM SERPL-SCNC: 4 MMOL/L (ref 3.5–5.1)
PROT SERPL-MCNC: 7 G/DL (ref 6.4–8.2)
PROT UR STRIP-MCNC: NEGATIVE MG/DL
RBC # BLD AUTO: 5 M/UL (ref 3.8–5.2)
RBC #/AREA URNS HPF: ABNORMAL /HPF (ref 0–5)
SODIUM SERPL-SCNC: 140 MMOL/L (ref 136–145)
SP GR UR REFRACTOMETRY: 1.02 (ref 1–1.03)
UROBILINOGEN UR QL STRIP.AUTO: 1 EU/DL (ref 0.2–1)
WBC # BLD AUTO: 5.3 K/UL (ref 3.6–11)
WBC URNS QL MICRO: ABNORMAL /HPF (ref 0–4)

## 2023-02-27 PROCEDURE — 74011250637 HC RX REV CODE- 250/637: Performed by: EMERGENCY MEDICINE

## 2023-02-27 PROCEDURE — 74011000250 HC RX REV CODE- 250: Performed by: EMERGENCY MEDICINE

## 2023-02-27 PROCEDURE — 74177 CT ABD & PELVIS W/CONTRAST: CPT

## 2023-02-27 PROCEDURE — 74011250636 HC RX REV CODE- 250/636: Performed by: EMERGENCY MEDICINE

## 2023-02-27 PROCEDURE — 36415 COLL VENOUS BLD VENIPUNCTURE: CPT

## 2023-02-27 PROCEDURE — 99285 EMERGENCY DEPT VISIT HI MDM: CPT

## 2023-02-27 PROCEDURE — 74011000636 HC RX REV CODE- 636: Performed by: EMERGENCY MEDICINE

## 2023-02-27 PROCEDURE — 96375 TX/PRO/DX INJ NEW DRUG ADDON: CPT

## 2023-02-27 PROCEDURE — 85025 COMPLETE CBC W/AUTO DIFF WBC: CPT

## 2023-02-27 PROCEDURE — 80053 COMPREHEN METABOLIC PANEL: CPT

## 2023-02-27 PROCEDURE — C9113 INJ PANTOPRAZOLE SODIUM, VIA: HCPCS | Performed by: EMERGENCY MEDICINE

## 2023-02-27 PROCEDURE — 83690 ASSAY OF LIPASE: CPT

## 2023-02-27 PROCEDURE — 96374 THER/PROPH/DIAG INJ IV PUSH: CPT

## 2023-02-27 PROCEDURE — 81001 URINALYSIS AUTO W/SCOPE: CPT

## 2023-02-27 RX ORDER — KETOROLAC TROMETHAMINE 30 MG/ML
15 INJECTION, SOLUTION INTRAMUSCULAR; INTRAVENOUS ONCE
Status: COMPLETED | OUTPATIENT
Start: 2023-02-27 | End: 2023-02-27

## 2023-02-27 RX ORDER — PANTOPRAZOLE SODIUM 40 MG/1
40 TABLET, DELAYED RELEASE ORAL DAILY
Qty: 20 TABLET | Refills: 0 | Status: SHIPPED | OUTPATIENT
Start: 2023-02-27 | End: 2023-03-19

## 2023-02-27 RX ORDER — MAG HYDROX/ALUMINUM HYD/SIMETH 200-200-20
30 SUSPENSION, ORAL (FINAL DOSE FORM) ORAL
Qty: 354 ML | Refills: 0 | Status: SHIPPED | OUTPATIENT
Start: 2023-02-27

## 2023-02-27 RX ORDER — GABAPENTIN 100 MG/1
CAPSULE ORAL
COMMUNITY
Start: 2022-11-29

## 2023-02-27 RX ORDER — ONDANSETRON 2 MG/ML
4 INJECTION INTRAMUSCULAR; INTRAVENOUS ONCE
Status: COMPLETED | OUTPATIENT
Start: 2023-02-27 | End: 2023-02-27

## 2023-02-27 RX ADMIN — PANTOPRAZOLE SODIUM 40 MG: 40 INJECTION, POWDER, FOR SOLUTION INTRAVENOUS at 21:49

## 2023-02-27 RX ADMIN — IOPAMIDOL 100 ML: 755 INJECTION, SOLUTION INTRAVENOUS at 21:19

## 2023-02-27 RX ADMIN — KETOROLAC TROMETHAMINE 15 MG: 30 INJECTION, SOLUTION INTRAMUSCULAR; INTRAVENOUS at 20:28

## 2023-02-27 RX ADMIN — SODIUM CHLORIDE 1000 ML: 9 INJECTION, SOLUTION INTRAVENOUS at 20:30

## 2023-02-27 RX ADMIN — LIDOCAINE HYDROCHLORIDE 40 ML: 20 SOLUTION ORAL; TOPICAL at 21:03

## 2023-02-27 RX ADMIN — ONDANSETRON HYDROCHLORIDE 4 MG: 2 SOLUTION INTRAMUSCULAR; INTRAVENOUS at 20:29

## 2023-02-27 NOTE — Clinical Note
[facility]  [address]  [address]  [phone]    Work/School Note    Date: 2/27/2023    To Whom It May concern:    Ana M Herrera was seen and treated today in the emergency room by the following provider(s):  Attending Provider: Pierce Martinez MD.      Ana M Herrera is excused from work/school on 02/27/23 and 02/28/23. She is medically clear to return to work/school on 3/1/2023.        Sincerely,          Shy Cartwright MD

## 2023-02-28 NOTE — ED TRIAGE NOTES
Triage note:generalized abdominal pain x 2 weeks intermittent sharp stabbing pain. denies fever. has not taken any medication for pain today. hx of gastric bypass 2 years ago. hx hysterectomy 2016.

## 2023-02-28 NOTE — ED PROVIDER NOTES
History of Present Illness:  generalized abdominal pain x 2 weeks intermittent sharp stabbing pain. denies fever. has not taken any medication for pain today. hx of gastric bypass 2 years ago. hx hysterectomy 2016. Past Medical History:   Diagnosis Date    Akathisia     Anemia     Asthma     8 yrs old    Attention deficit disorder with hyperactivity(314.01) 10/31/2013    Bipolar I disorder, most recent episode (or current) manic, unspecified 10/31/2013    [facility]    COVID-19 vaccine series completed     Moderna dose #1 received in 2/2021; dose #2 received in 3/2021    COVID-19 virus infection 08/13/2021    History of hypertension     with first pregnacy    Inability to control urination     Morbid obesity (HCC)     ELVIRA (obstructive sleep apnea)     Not on CPAP    PCOS (polycystic ovarian syndrome)     A1c 6.6 4/2015    Personality disorder ([address]      histrionic              Lonne Pyo    Posttraumatic stress disorder 10/31/2013    Prediabetes     Prior to gastric bypass    PUD (peptic ulcer disease)     Skin disease     Frequent boils    Somatization disorder 10/31/2013    Unspecified personality disorder 10/31/2013       Past Surgical History:   Procedure Laterality Date    HX CARPAL TUNNEL RELEASE      3/2022    HX GYN  2013    BTL    HX GYN      D & C    HX HYSTERECTOMY  2/25/15    Spasic at [address]     HX OTHER SURGICAL  08/31/2020    Laparoscopic gastric bypass; Dr. Katherine Garcia ([facility]     HX TONSILLECTOMY  2007         Family History:   Problem Relation Age of Onset    Diabetes Maternal Grandmother     Lung Disease Maternal Grandfather     Cancer Paternal Grandmother         Pancreatic    Diabetes Mother     Hypertension Mother     Diabetes Father     Hypertension Father     No Known Problems Brother     No Known Problems Brother     No Known Problems Brother     No Known Problems Brother     No Known Problems Son     No Known Problems Son     Anesth Problems Neg Hx        Social History     Socioeconomic History    Marital status:      Spouse name: Not on file    Number of children: Not on file    Years of education: Not on file    Highest education level: Not on file   Occupational History    Occupation: [employer] for [employer] [address]   Tobacco Use    Smoking status: Never    Smokeless tobacco: Never   Vaping Use    Vaping Use: Never used   Substance and Sexual Activity    Alcohol use: No    Drug use: No    Sexual activity: Yes     Partners: Male     Birth control/protection: Condom   Other Topics Concern    Not on file   Social History Narrative    Lives in [place] with Mom and 2 sons     Social Determinants of Health     Financial Resource Strain: Not on file   Food Insecurity: Not on file   Transportation Needs: Not on file   Physical Activity: Not on file   Stress: Not on file   Social Connections: Not on file   Intimate Partner Violence: Not on file   Housing Stability: Not on file         ALLERGIES: Bactrim [sulfamethoprim ds] and Tape [adhesive]    Review of Systems   Gastrointestinal:  Positive for abdominal pain and nausea. Musculoskeletal:  Positive for back pain. All other systems reviewed and are negative. Vitals:    02/27/23 1943 02/27/23 2146   BP:  108/66   Pulse: 64 62   Resp: 16 16   Temp: 98.4 °F (36.9 °C) 98.4 °F (36.9 °C)   SpO2: 100% 100%   Weight: 114.5 kg (252 lb 6.8 oz)    Height: 5' 8\" (1.727 m)             Physical Exam  Vitals and nursing note reviewed. Constitutional:       General: She is not in acute distress. Appearance: She is normal weight. She is not ill-appearing, toxic-appearing or diaphoretic. HENT:      Head: Normocephalic and atraumatic. Right Ear: External ear normal.      Left Ear: External ear normal.      Nose: Nose normal. No congestion or rhinorrhea. Mouth/Throat:      Mouth: Mucous membranes are moist.      Pharynx: Oropharynx is clear.  No oropharyngeal exudate or posterior oropharyngeal erythema. Eyes:      General: No scleral icterus. Extraocular Movements: Extraocular movements intact. Conjunctiva/sclera: Conjunctivae normal.   Cardiovascular:      Rate and Rhythm: Normal rate and regular rhythm. Pulses: Normal pulses. Heart sounds: Normal heart sounds. No murmur heard. No gallop. Pulmonary:      Effort: Pulmonary effort is normal. No respiratory distress. Breath sounds: Normal breath sounds. Abdominal:      General: Abdomen is flat. Bowel sounds are normal. There is no distension. Palpations: Abdomen is soft. Tenderness: There is abdominal tenderness in the epigastric area. There is no guarding or rebound. Musculoskeletal:         General: No swelling, tenderness or deformity. Normal range of motion. Cervical back: Normal range of motion and neck supple. No rigidity or tenderness. Right lower leg: No edema. Left lower leg: No edema. Lymphadenopathy:      Cervical: No cervical adenopathy. Skin:     General: Skin is warm. Capillary Refill: Capillary refill takes less than 2 seconds. Coloration: Skin is not jaundiced. Findings: No bruising or erythema. Neurological:      General: No focal deficit present. Mental Status: She is alert and oriented to person, place, and time. Cranial Nerves: No cranial nerve deficit. Motor: No weakness. Psychiatric:         Mood and Affect: Mood normal.         Thought Content:  Thought content normal.        Recent Results (from the past 72 hour(s))   URINALYSIS W/MICROSCOPIC    Collection Time: 02/27/23  8:15 PM   Result Value Ref Range    Color YELLOW/STRAW      Appearance CLEAR CLEAR      Specific gravity 1.020 1.003 - 1.030      pH (UA) 7.0 5.0 - 8.0      Protein Negative NEG mg/dL    Glucose Negative NEG mg/dL    Ketone Negative NEG mg/dL    Bilirubin Negative NEG      Blood Negative NEG      Urobilinogen 1.0 0.2 - 1.0 EU/dL    Nitrites Negative NEG      Leukocyte Esterase Negative NEG      WBC 0-4 0 - 4 /hpf    RBC 0-5 0 - 5 /hpf    Epithelial cells FEW FEW /lpf    Bacteria Negative NEG /hpf    Mucus TRACE (A) NEG /lpf   LIPASE    Collection Time: 02/27/23  8:15 PM   Result Value Ref Range    Lipase 50 (L) 73 - 551 U/L   METABOLIC PANEL, COMPREHENSIVE    Collection Time: 02/27/23  8:15 PM   Result Value Ref Range    Sodium 140 136 - 145 mmol/L    Potassium 4.0 3.5 - 5.1 mmol/L    Chloride 104 97 - 108 mmol/L    CO2 27 21 - 32 mmol/L    Anion gap 9 5 - 15 mmol/L    Glucose 81 65 - 100 mg/dL    BUN 8 6 - 20 MG/DL    Creatinine 0.71 0.55 - 1.02 MG/DL    BUN/Creatinine ratio 11 (L) 12 - 20      eGFR >60 >60 ml/min/1.73m2    Calcium 8.4 (L) 8.5 - 10.1 MG/DL    Bilirubin, total 0.4 0.2 - 1.0 MG/DL    ALT (SGPT) 11 (L) 12 - 78 U/L    AST (SGOT) 15 15 - 37 U/L    Alk. phosphatase 94 45 - 117 U/L    Protein, total 7.0 6.4 - 8.2 g/dL    Albumin 3.6 3.5 - 5.0 g/dL    Globulin 3.4 2.0 - 4.0 g/dL    A-G Ratio 1.1 1.1 - 2.2     CBC WITH AUTOMATED DIFF    Collection Time: 02/27/23  8:15 PM   Result Value Ref Range    WBC 5.3 3.6 - 11.0 K/uL    RBC 5.00 3.80 - 5.20 M/uL    HGB 12.2 11.5 - 16.0 g/dL    HCT 38.8 35.0 - 47.0 %    MCV 77.6 (L) 80.0 - 99.0 FL    MCH 24.4 (L) 26.0 - 34.0 PG    MCHC 31.4 30.0 - 36.5 g/dL    RDW 13.9 11.5 - 14.5 %    PLATELET 247 981 - 711 K/uL    MPV 10.1 8.9 - 12.9 FL    NRBC 0.0 0  WBC    ABSOLUTE NRBC 0.00 0.00 - 0.01 K/uL    NEUTROPHILS 43 32 - 75 %    LYMPHOCYTES 46 12 - 49 %    MONOCYTES 6 5 - 13 %    EOSINOPHILS 4 0 - 7 %    BASOPHILS 1 0 - 1 %    IMMATURE GRANULOCYTES 0 0.0 - 0.5 %    ABS. NEUTROPHILS 2.3 1.8 - 8.0 K/UL    ABS. LYMPHOCYTES 2.4 0.8 - 3.5 K/UL    ABS. MONOCYTES 0.3 0.0 - 1.0 K/UL    ABS. EOSINOPHILS 0.2 0.0 - 0.4 K/UL    ABS. BASOPHILS 0.0 0.0 - 0.1 K/UL    ABS. IMM.  GRANS. 0.0 0.00 - 0.04 K/UL    DF AUTOMATED         CT ABD PELV W CONT   Final Result   There is perigastric stranding in the fat adjacent to upper stomach   status post gastric bypass concerning for gastritis. No acute findings otherwise   or other findings to correlate with abdominal pain. Multiple right ovarian cysts   likely related to history of PCOS and for which ultrasound follow-up can be   considered. Medical Decision Making  I considered GERD, gastritis, pancreatitis, hepatitis, cholecystitis, gastroenteritis, colitis, diverticulitis, peptic ulcer disease, complications from the gastric bypass and other causes    Amount and/or Complexity of Data Reviewed  Labs: ordered. Details: Urinalysis normal, lipase, comprehensive metabolic panel and CBC all normal.  Radiology: ordered. Details: CT demonstrates evidence of gastritis    Risk  OTC drugs. Prescription drug management. Decision regarding hospitalization. Risk Details: The patient ultiumately did not warrant hospitalization nor any acute surgical intervention, I considered the need for both. Also considered the need to obtain additional imaging and/or labs beyond what may have been ordered but no additional testing was indicated based on the patient's condition and results of any other testing that may have been performed. Any medications given here and/or prescribed for discharge are documented within the other portions of the note. After any medications or treatments that may have been provided here the patient was improved and symptoms had resolved or become tolerable or no medications or treatments were indicated aced on the patient's condition. The patient was deemed stable safe and appropriate for discharge to home and is instructed to follow-up with his primary care doctor and return for any new or worsening symptoms.            Procedures    Medications   ondansetron [facility]) injection 4 mg (4 mg IntraVENous Given 2/27/23 2029)   sodium chloride 0.9 % bolus infusion 1,000 mL (0 mL IntraVENous IV Completed 2/27/23 2045)   ketorolac (TORADOL) injection 15 mg (15 mg IntraVENous Given 2/27/23 2028)   iopamidoL (ISOVUE-370) 370 mg iodine /mL (76 %) injection 100 mL (100 mL IntraVENous Given 2/27/23 2119)   mylanta/viscous lidocaine (GI COCKTAIL) (40 mL Oral Given 2/27/23 2103)   pantoprazole (PROTONIX) 40 mg in 0.9% sodium chloride 10 mL injection (40 mg IntraVENous Given 2/27/23 2149)          My Medications        START taking these medications        Instructions Each Dose to Equal Morning Noon Evening Bedtime   alum-mag hydroxide-simeth 200-200-20 mg/5 mL Susp  Commonly known as: Maalox Advanced    Your last dose was: Your next dose is: Take 30 mL by mouth every four (4) hours as needed for Indigestion. 30 mL                        CHANGE how you take these medications        Instructions Each Dose to Equal Morning Noon Evening Bedtime   pantoprazole 40 mg tablet  Commonly known as: Protonix  What changed: See the new instructions. Your last dose was: Your next dose is: Take 1 Tablet by mouth daily for 20 days. 40 mg                        ASK your doctor about these medications        Instructions Each Dose to Equal Morning Noon Evening Bedtime   Abilify Maintena 400 mg Sers  Generic drug: ARIPiprazole    Your last dose was: Your next dose is:         300 mg by IntraMUSCular route. 300 mg                 benztropine 1 mg tablet  Commonly known as: COGENTIN    Your last dose was: Your next dose is:                         Biotin 2,500 mcg Cap    Your last dose was: Your next dose is: Take  by mouth.                  cyanocobalamin 100 mcg tablet  Commonly known as: VITAMIN B12    Your last dose was: Your next dose is: Take 100 mcg by mouth daily. 100 mcg                 gabapentin 100 mg capsule  Commonly known as: NEURONTIN    Your last dose was: Your next dose is:         TAKE 1 CAPSULE BY MOUTH ONCE DAILY IN THE MORNING                  topiramate 25 mg tablet  Commonly known as: TOPAMAX    Your last dose was:      Your next dose is:         Take 25 mg by mouth daily (with breakfast). 25 mg                           Where to Get Your Medications        These medications were sent to [facility], [address]  [address] [facility] [place] [address] [address]      Phone: [phone]   rolan bazzi [phone] mg/5 mL Susp  pantoprazole 40 mg tablet         Encounter Diagnoses     ICD-10-CM ICD-9-CM   1.  Gastritis and duodenitis  K29.90 535.50       Follow-up Information       Follow up With Specialties Details Why Contact Info    Adrien Saldaña MD Internal Medicine Physician, Bariatrics  As needed [address] [address]  [phone]      SPT [address] [facility]  As needed, If symptoms worsen [phone] [facility] [phone]:  Discharged

## 2023-03-26 ENCOUNTER — APPOINTMENT (OUTPATIENT)
Dept: CT IMAGING | Age: 38
End: 2023-03-26
Payer: MEDICAID

## 2023-03-26 ENCOUNTER — HOSPITAL ENCOUNTER (EMERGENCY)
Age: 38
Discharge: HOME | End: 2023-03-26
Attending: STUDENT IN AN ORGANIZED HEALTH CARE EDUCATION/TRAINING PROGRAM
Payer: MEDICAID

## 2023-03-26 VITALS
TEMPERATURE: 98.1 F | WEIGHT: 252 LBS | SYSTOLIC BLOOD PRESSURE: 124 MMHG | HEART RATE: 65 BPM | BODY MASS INDEX: 38.32 KG/M2 | DIASTOLIC BLOOD PRESSURE: 73 MMHG | RESPIRATION RATE: 18 BRPM | OXYGEN SATURATION: 99 %

## 2023-03-26 DIAGNOSIS — R10.33 PERIUMBILICAL ABDOMINAL PAIN: Primary | ICD-10-CM

## 2023-03-26 LAB
ALBUMIN SERPL-MCNC: 3.9 G/DL (ref 3.5–5)
ALBUMIN/GLOB SERPL: 1.1 (ref 1.1–2.2)
ALP SERPL-CCNC: 93 U/L (ref 45–117)
ALT SERPL-CCNC: 14 U/L (ref 12–78)
ANION GAP SERPL CALC-SCNC: 11 MMOL/L (ref 5–15)
APPEARANCE UR: CLEAR
AST SERPL-CCNC: 15 U/L (ref 15–37)
BACTERIA URNS QL MICRO: ABNORMAL /HPF
BASOPHILS # BLD: 0 K/UL (ref 0–0.1)
BASOPHILS NFR BLD: 1 % (ref 0–1)
BILIRUB SERPL-MCNC: 0.2 MG/DL (ref 0.2–1)
BILIRUB UR QL: NEGATIVE
BUN SERPL-MCNC: 8 MG/DL (ref 6–20)
BUN/CREAT SERPL: 10 (ref 12–20)
CALCIUM SERPL-MCNC: 8.5 MG/DL (ref 8.5–10.1)
CHLORIDE SERPL-SCNC: 105 MMOL/L (ref 97–108)
CO2 SERPL-SCNC: 25 MMOL/L (ref 21–32)
COLOR UR: ABNORMAL
CREAT SERPL-MCNC: 0.78 MG/DL (ref 0.55–1.02)
DIFFERENTIAL METHOD BLD: ABNORMAL
EOSINOPHIL # BLD: 0.2 K/UL (ref 0–0.4)
EOSINOPHIL NFR BLD: 4 % (ref 0–7)
EPITH CASTS URNS QL MICRO: ABNORMAL /LPF
ERYTHROCYTE [DISTWIDTH] IN BLOOD BY AUTOMATED COUNT: 13.7 % (ref 11.5–14.5)
GLOBULIN SER CALC-MCNC: 3.6 G/DL (ref 2–4)
GLUCOSE SERPL-MCNC: 70 MG/DL (ref 65–100)
GLUCOSE UR STRIP.AUTO-MCNC: NEGATIVE MG/DL
HCT VFR BLD AUTO: 40.3 % (ref 35–47)
HGB BLD-MCNC: 12.5 G/DL (ref 11.5–16)
HGB UR QL STRIP: NEGATIVE
IMM GRANULOCYTES # BLD AUTO: 0 K/UL (ref 0–0.04)
IMM GRANULOCYTES NFR BLD AUTO: 0 % (ref 0–0.5)
KETONES UR QL STRIP.AUTO: ABNORMAL MG/DL
LEUKOCYTE ESTERASE UR QL STRIP.AUTO: ABNORMAL
LIPASE SERPL-CCNC: 60 U/L (ref 73–393)
LYMPHOCYTES # BLD: 2.6 K/UL (ref 0.8–3.5)
LYMPHOCYTES NFR BLD: 47 % (ref 12–49)
MCH RBC QN AUTO: 24.3 PG (ref 26–34)
MCHC RBC AUTO-ENTMCNC: 31 G/DL (ref 30–36.5)
MCV RBC AUTO: 78.3 FL (ref 80–99)
MONOCYTES # BLD: 0.4 K/UL (ref 0–1)
MONOCYTES NFR BLD: 8 % (ref 5–13)
MUCOUS THREADS URNS QL MICRO: ABNORMAL /LPF
NEUTS SEG # BLD: 2.3 K/UL (ref 1.8–8)
NEUTS SEG NFR BLD: 41 % (ref 32–75)
NITRITE UR QL STRIP.AUTO: NEGATIVE
NRBC # BLD: 0 K/UL (ref 0–0.01)
NRBC BLD-RTO: 0 PER 100 WBC
PH UR STRIP: 6 (ref 5–8)
PLATELET # BLD AUTO: 249 K/UL (ref 150–400)
PMV BLD AUTO: 10 FL (ref 8.9–12.9)
POTASSIUM SERPL-SCNC: 3.9 MMOL/L (ref 3.5–5.1)
PROT SERPL-MCNC: 7.5 G/DL (ref 6.4–8.2)
PROT UR STRIP-MCNC: NEGATIVE MG/DL
RBC # BLD AUTO: 5.15 M/UL (ref 3.8–5.2)
RBC #/AREA URNS HPF: ABNORMAL /HPF (ref 0–5)
SODIUM SERPL-SCNC: 141 MMOL/L (ref 136–145)
SP GR UR REFRACTOMETRY: 1.02 (ref 1–1.03)
UR CULT HOLD, URHOLD: NORMAL
UROBILINOGEN UR QL STRIP.AUTO: 0.2 EU/DL (ref 0.2–1)
WBC # BLD AUTO: 5.5 K/UL (ref 3.6–11)
WBC URNS QL MICRO: ABNORMAL /HPF (ref 0–4)

## 2023-03-26 PROCEDURE — 74011250637 HC RX REV CODE- 250/637

## 2023-03-26 PROCEDURE — 74011000250 HC RX REV CODE- 250

## 2023-03-26 PROCEDURE — 85025 COMPLETE CBC W/AUTO DIFF WBC: CPT

## 2023-03-26 PROCEDURE — 99285 EMERGENCY DEPT VISIT HI MDM: CPT

## 2023-03-26 PROCEDURE — 74177 CT ABD & PELVIS W/CONTRAST: CPT

## 2023-03-26 PROCEDURE — 83690 ASSAY OF LIPASE: CPT

## 2023-03-26 PROCEDURE — 36415 COLL VENOUS BLD VENIPUNCTURE: CPT

## 2023-03-26 PROCEDURE — 74011000636 HC RX REV CODE- 636: Performed by: STUDENT IN AN ORGANIZED HEALTH CARE EDUCATION/TRAINING PROGRAM

## 2023-03-26 PROCEDURE — 80053 COMPREHEN METABOLIC PANEL: CPT

## 2023-03-26 PROCEDURE — 81001 URINALYSIS AUTO W/SCOPE: CPT

## 2023-03-26 RX ORDER — SUCRALFATE 1 G/1
1 TABLET ORAL 4 TIMES DAILY
Qty: 120 TABLET | Refills: 0 | Status: SHIPPED | OUTPATIENT
Start: 2023-03-26 | End: 2023-03-29 | Stop reason: ALTCHOICE

## 2023-03-26 RX ORDER — PANTOPRAZOLE SODIUM 40 MG/1
40 TABLET, DELAYED RELEASE ORAL DAILY
Qty: 30 TABLET | Refills: 0 | Status: SHIPPED | OUTPATIENT
Start: 2023-03-26 | End: 2023-04-25

## 2023-03-26 RX ORDER — PANTOPRAZOLE SODIUM 40 MG/1
40 TABLET, DELAYED RELEASE ORAL DAILY
Qty: 30 TABLET | Refills: 0 | Status: SHIPPED | OUTPATIENT
Start: 2023-03-26 | End: 2023-03-26 | Stop reason: SDUPTHER

## 2023-03-26 RX ORDER — DICYCLOMINE HYDROCHLORIDE 10 MG/1
20 CAPSULE ORAL
Status: COMPLETED | OUTPATIENT
Start: 2023-03-26 | End: 2023-03-26

## 2023-03-26 RX ORDER — SUCRALFATE 1 G/1
1 TABLET ORAL 4 TIMES DAILY
Qty: 120 TABLET | Refills: 0 | Status: SHIPPED | OUTPATIENT
Start: 2023-03-26 | End: 2023-03-26 | Stop reason: SDUPTHER

## 2023-03-26 RX ORDER — ACETAMINOPHEN 500 MG
1000 TABLET ORAL
Status: COMPLETED | OUTPATIENT
Start: 2023-03-26 | End: 2023-03-26

## 2023-03-26 RX ADMIN — IOPAMIDOL 100 ML: 755 INJECTION, SOLUTION INTRAVENOUS at 19:43

## 2023-03-26 RX ADMIN — ACETAMINOPHEN 1000 MG: 500 TABLET ORAL at 19:52

## 2023-03-26 RX ADMIN — LIDOCAINE HYDROCHLORIDE 40 ML: 20 SOLUTION ORAL; TOPICAL at 19:31

## 2023-03-26 RX ADMIN — DICYCLOMINE HYDROCHLORIDE 20 MG: 10 CAPSULE ORAL at 20:53

## 2023-03-26 NOTE — ED PROVIDER NOTES
Abdominal Pain   Associated symptoms include diarrhea. Pertinent negatives include no fever, no nausea, no vomiting, no constipation, no dysuria, no hematuria, no headaches, no arthralgias, no myalgias and no chest pain. Zi Landon is a 40 y.o. female with Hx of PUD, marginal ulcer, gastric bypass 2 years ago who presents ambulatory to short [facility] ED with cc of intermittent periumbilical pain for 6 weeks. Seen last month for abdominal pain. CT performed at this time concerning for gastritis. Incidental finding of multiple right ovarian cyst.  Discharged with Maalox and Protonix. Patient has reported since then, she has intermittent episodes of sharp periumbilical pain several times a week associated with diarrhea. Denies fever, chills, nausea, vomiting, constipation, blood in stool, urinary symptoms, any other concerns at this time. History of gastric bypass 2 years ago. Followed by [facility] gastroenterology, history of PUD and marginal ulcer. PCP: Galilea Gtz MD    There are no other complaints, changes or physical findings at this time.     Past Medical History:   Diagnosis Date    Akathisia     Anemia     Asthma     8 yrs old    Attention deficit disorder with hyperactivity(314.01) 10/31/2013    Bipolar I disorder, most recent episode (or current) manic, unspecified 10/31/2013    [facility]    COVID-19 vaccine series completed     Moderna dose #1 received in 2/2021; dose #2 received in 3/2021    COVID-19 virus infection 08/13/2021    History of hypertension     with first pregnacy    Inability to control urination     Morbid obesity (HCC)     ELVIRA (obstructive sleep apnea)     Not on CPAP    PCOS (polycystic ovarian syndrome)     A1c 6.6 4/2015    Personality disorder ([address]      histrionic              Aparna Gelacio    Posttraumatic stress disorder 10/31/2013    Prediabetes     Prior to gastric bypass    PUD (peptic ulcer disease)     Skin disease     Frequent boils Somatization disorder 10/31/2013    Unspecified personality disorder 10/31/2013       Past Surgical History:   Procedure Laterality Date    HX CARPAL TUNNEL RELEASE      3/2022    HX GYN  2013    BTL    HX GYN      D & C    HX HYSTERECTOMY  2/25/15    Spasic at [address]     HX OTHER SURGICAL  08/31/2020    Laparoscopic gastric bypass; Dr. Darylene Coppersmith ([facility]     HX TONSILLECTOMY  2007         Family History:   Problem Relation Age of Onset    Diabetes Maternal Grandmother     Lung Disease Maternal Grandfather     Cancer Paternal Grandmother         Pancreatic    Diabetes Mother     Hypertension Mother     Diabetes Father     Hypertension Father     No Known Problems Brother     No Known Problems Brother     No Known Problems Brother     No Known Problems Brother     No Known Problems Son     No Known Problems Son     Anesth Problems Neg Hx        Social History     Socioeconomic History    Marital status:      Spouse name: Not on file    Number of children: Not on file    Years of education: Not on file    Highest education level: Not on file   Occupational History    Occupation: [employer] for [employer] [address]   Tobacco Use    Smoking status: Never    Smokeless tobacco: Never   Vaping Use    Vaping Use: Never used   Substance and Sexual Activity    Alcohol use: No    Drug use: No    Sexual activity: Yes     Partners: Male     Birth control/protection: Condom   Other Topics Concern    Not on file   Social History Narrative    Lives in [address] with Mom and 2 sons     Social Determinants of Health     Financial Resource Strain: Not on file   Food Insecurity: Not on file   Transportation Needs: Not on file   Physical Activity: Not on file   Stress: Not on file   Social Connections: Not on file   Intimate Partner Violence: Not on file   Housing Stability: Not on file         ALLERGIES: Bactrim [sulfamethoprim ds], Ibuprofen, and Tape [adhesive]    Review of Systems   Constitutional:  Negative for activity change, appetite change, chills and fever. HENT:  Negative for congestion, rhinorrhea and sore throat. Respiratory:  Negative for cough and shortness of breath. Cardiovascular:  Negative for chest pain. Gastrointestinal:  Positive for abdominal pain and diarrhea. Negative for blood in stool, constipation, nausea and vomiting. Genitourinary:  Negative for decreased urine volume, difficulty urinating, dysuria and hematuria. Musculoskeletal:  Negative for arthralgias and myalgias. Skin:  Negative for color change and rash. Neurological:  Negative for light-headedness and headaches. Psychiatric/Behavioral:  Negative for agitation and confusion. The patient is not nervous/anxious. Vitals:    03/26/23 1840 03/26/23 1906 03/26/23 1921   BP: 129/81 129/83 126/74   Pulse: 71     Resp: 16     Temp: 98.3 °F (36.8 °C)     SpO2: 99%     Weight: 114.3 kg (252 lb)              Physical Exam  Vitals and nursing note reviewed. Constitutional:       Appearance: Normal appearance. HENT:      Head: Normocephalic and atraumatic. Right Ear: External ear normal.      Left Ear: External ear normal.      Nose: Nose normal.      Mouth/Throat:      Mouth: Mucous membranes are moist.   Eyes:      Extraocular Movements: Extraocular movements intact. Conjunctiva/sclera: Conjunctivae normal.      Pupils: Pupils are equal, round, and reactive to light. Cardiovascular:      Rate and Rhythm: Normal rate and regular rhythm. Pulses: Normal pulses. Heart sounds: Normal heart sounds. Pulmonary:      Effort: Pulmonary effort is normal.      Breath sounds: Normal breath sounds. Abdominal:      Palpations: Abdomen is soft. Tenderness: There is abdominal tenderness in the epigastric area and periumbilical area. There is guarding. There is no rebound. Musculoskeletal:         General: Normal range of motion. Cervical back: Normal range of motion and neck supple.    Skin:     General: Skin is warm and dry. Capillary Refill: Capillary refill takes less than 2 seconds. Neurological:      General: No focal deficit present. Mental Status: She is alert and oriented to person, place, and time. Mental status is at baseline. Psychiatric:         Mood and Affect: Mood normal.         Behavior: Behavior normal.         Thought Content: Thought content normal.         Judgment: Judgment normal.        Medical Decision Making  Ddx: Gastritis, PUD, gastric bypass complication, pancreatitis, and others    60-year-old female presents with intermittent periumbilical pain for 6 weeks as well as diarrhea. Exam remarkable for significant tenderness to epigastric and periumbilical region. Pancreatitis ruled out with lipase. Repeated CT AP even the patient had one last month, because patient's symptoms have significantly worsened and patient requested repeat imaging as these symptoms are concerning for her. CT unremarkable. Gave GI cocktail, Tylenol, Bentyl in ER. Discussed with patient that she has a history of ulcers and she may require further work-up with endoscopy. Discharged with Protonix, Carafate, GI follow-up, strict return precautions. Amount and/or Complexity of Data Reviewed  Labs: ordered. Radiology: ordered. Risk  OTC drugs. Prescription drug management.            Procedures    LABORATORY TESTS:  Recent Results (from the past 12 hour(s))   URINALYSIS W/MICROSCOPIC    Collection Time: 03/26/23  6:44 PM   Result Value Ref Range    Color YELLOW/STRAW      Appearance CLEAR CLEAR      Specific gravity 1.025 1.003 - 1.030      pH (UA) 6.0 5.0 - 8.0      Protein Negative NEG mg/dL    Glucose Negative NEG mg/dL    Ketone TRACE (A) NEG mg/dL    Bilirubin Negative NEG      Blood Negative NEG      Urobilinogen 0.2 0.2 - 1.0 EU/dL    Nitrites Negative NEG      Leukocyte Esterase TRACE (A) NEG      WBC 5-10 0 - 4 /hpf    RBC 0-5 0 - 5 /hpf    Epithelial cells MODERATE (A) FEW /lpf Bacteria 1+ (A) NEG /hpf    Mucus 1+ (A) NEG /lpf   URINE CULTURE HOLD SAMPLE    Collection Time: 03/26/23  6:44 PM    Specimen: Urine   Result Value Ref Range    Urine culture hold        Urine on hold in Microbiology dept for 2 days. If unpreserved urine is submitted, it cannot be used for addtional testing after 24 hours, recollection will be required. CBC WITH AUTOMATED DIFF    Collection Time: 03/26/23  6:44 PM   Result Value Ref Range    WBC 5.5 3.6 - 11.0 K/uL    RBC 5.15 3.80 - 5.20 M/uL    HGB 12.5 11.5 - 16.0 g/dL    HCT 40.3 35.0 - 47.0 %    MCV 78.3 (L) 80.0 - 99.0 FL    MCH 24.3 (L) 26.0 - 34.0 PG    MCHC 31.0 30.0 - 36.5 g/dL    RDW 13.7 11.5 - 14.5 %    PLATELET 225 462 - 854 K/uL    MPV 10.0 8.9 - 12.9 FL    NRBC 0.0 0  WBC    ABSOLUTE NRBC 0.00 0.00 - 0.01 K/uL    NEUTROPHILS 41 32 - 75 %    LYMPHOCYTES 47 12 - 49 %    MONOCYTES 8 5 - 13 %    EOSINOPHILS 4 0 - 7 %    BASOPHILS 1 0 - 1 %    IMMATURE GRANULOCYTES 0 0.0 - 0.5 %    ABS. NEUTROPHILS 2.3 1.8 - 8.0 K/UL    ABS. LYMPHOCYTES 2.6 0.8 - 3.5 K/UL    ABS. MONOCYTES 0.4 0.0 - 1.0 K/UL    ABS. EOSINOPHILS 0.2 0.0 - 0.4 K/UL    ABS. BASOPHILS 0.0 0.0 - 0.1 K/UL    ABS. IMM. GRANS. 0.0 0.00 - 0.04 K/UL    DF AUTOMATED     METABOLIC PANEL, COMPREHENSIVE    Collection Time: 03/26/23  6:44 PM   Result Value Ref Range    Sodium 141 136 - 145 mmol/L    Potassium 3.9 3.5 - 5.1 mmol/L    Chloride 105 97 - 108 mmol/L    CO2 25 21 - 32 mmol/L    Anion gap 11 5 - 15 mmol/L    Glucose 70 65 - 100 mg/dL    BUN 8 6 - 20 MG/DL    Creatinine 0.78 0.55 - 1.02 MG/DL    BUN/Creatinine ratio 10 (L) 12 - 20      eGFR >60 >60 ml/min/1.73m2    Calcium 8.5 8.5 - 10.1 MG/DL    Bilirubin, total 0.2 0.2 - 1.0 MG/DL    ALT (SGPT) 14 12 - 78 U/L    AST (SGOT) 15 15 - 37 U/L    Alk.  phosphatase 93 45 - 117 U/L    Protein, total 7.5 6.4 - 8.2 g/dL    Albumin 3.9 3.5 - 5.0 g/dL    Globulin 3.6 2.0 - 4.0 g/dL    A-G Ratio 1.1 1.1 - 2.2     LIPASE    Collection Time: 03/26/23  6:44 PM   Result Value Ref Range    Lipase 60 (L) 73 - 393 U/L       IMAGING RESULTS:  CT ABD PELV W CONT   Final Result   Postsurgical changes from gastric bypass. No evidence of bowel obstruction. No bowel inflammation. Normal appendix. Small fat-containing umbilical hernia. MEDICATIONS GIVEN:  Medications   mylanta/viscous lidocaine (GI COCKTAIL) (40 mL Oral Given 3/26/23 1931)   iopamidoL (ISOVUE-370) 370 mg iodine /mL (76 %) injection 100 mL (100 mL IntraVENous Given 3/26/23 1943)   acetaminophen (TYLENOL) tablet 1,000 mg (1,000 mg Oral Given 3/26/23 1952)   dicyclomine (BENTYL) capsule 20 mg (20 mg Oral Given 3/26/23 2053)       IMPRESSION:  1. Periumbilical abdominal pain        PLAN:  1. Current Discharge Medication List        START taking these medications    Details   pantoprazole (PROTONIX) 40 mg tablet Take 1 Tablet by mouth daily for 30 days. Qty: 30 Tablet, Refills: 0  Start date: 3/26/2023, End date: 4/25/2023      sucralfate (Carafate) 1 gram tablet Take 1 Tablet by mouth four (4) times daily for 30 days. Qty: 120 Tablet, Refills: 0  Start date: 3/26/2023, End date: 4/25/2023           2. Follow-up Information       Follow up With Specialties Details Why [facility] Go to  As needed, If symptoms worsen Brandy Sellers [address] [id number] [phone]    Lv Tom MD Internal Medicine Physician, Bariatrics Schedule an appointment as soon as possible for a visit   [address] [address] [address] [address]  [phone]      Your specialist  Schedule an appointment as soon as possible for a visit  Your gastroenterologist           3. Return to ED if worse     Presentation, management, and disposition were discussed with the attending physician, Dr. Angela Rosales, who is in agreement with plan of care.

## 2023-03-26 NOTE — DISCHARGE INSTRUCTIONS
As discussed, your blood work, urine and CT scan were all unremarkable. Continue taking Pantoprazole. You are also being prescribed Carafate for potential stomach ulcers. Take Tylenol up to 1000mg every 8 hours as needed for pain. Follow up with your PCP and GI for further management. Return to the ER if you experience severe or worsening symptoms.

## 2023-03-26 NOTE — Clinical Note
[facility]  [address]  [address]  [phone]    Work/School Note    Date: 3/26/2023    To Whom It May concern:    Tammi Enriquez was seen and treated today in the emergency room by the following provider(s):  Attending Provider: Massiel Arteaga DO  Physician Assistant: Minesh Villa PA-C. Tammi Enriquez is excused from work/school on 03/26/23 and 03/27/23. She is medically clear to return to work/school on 3/28/2023.        Sincerely,          Fernando Florentino PA-C

## 2023-03-26 NOTE — ED TRIAGE NOTES
Patient arrives with c/o abdominal pain for the last few weeks but getting worse. Patient was seen here a few weeks ago when pain started and she as told she had gastritis. Was started on Protonix with no relief. Had 2 loose BMs today hoping for relief, but had no relief. Denies nausea, vomiting, fever.

## 2023-03-27 NOTE — ROUTINE PROCESS
Emergency Room Nursing Note        Patient Name: Dari Arias      : 1985             MRN: [id number]      Chief Complaint: Abdominal Pain      Admit Diagnosis: No admission diagnoses are documented for this encounter. Surgery: * No surgery found *            MD/RN reviewed discharge instructions and options with patient. Patient verbalized understanding. RN reviewed discharge instructions using teach back method. Patient ambulatory to exit without difficulty and no acute signs of distress. No complaints or needs expressed at this time. Patient counseled on medications prescribed at discharge (If prescribed). Vital signs stable. Patient to follow up with PCP/Specialist on the next business day for appointment. All questions answered by ER RN.           Lines:        Vitals: Patient Vitals for the past 12 hrs:   Temp Pulse Resp BP SpO2   23 98.1 °F (36.7 °C) 65 18 124/73 99 %   23 1921 -- -- -- 126/74 --   23 1906 -- -- -- 129/83 --   23 1840 98.3 °F (36.8 °C) 71 16 129/81 99 %         Signed by: Chadd Ley RN, FELIX, BSN, [facility]                                              3/26/2023 at 9:07 PM

## 2023-03-28 ENCOUNTER — TELEPHONE (OUTPATIENT)
Dept: PRIMARY CARE CLINIC | Age: 38
End: 2023-03-28

## 2023-03-28 NOTE — TELEPHONE ENCOUNTER
----- Message from Houston Albright MD sent at 3/27/2023  9:19 AM EDT -----  Needs a ER follow up visit.

## 2023-03-29 ENCOUNTER — OFFICE VISIT (OUTPATIENT)
Dept: PRIMARY CARE CLINIC | Age: 38
End: 2023-03-29
Payer: MEDICAID

## 2023-03-29 VITALS
RESPIRATION RATE: 18 BRPM | WEIGHT: 245.2 LBS | HEART RATE: 59 BPM | OXYGEN SATURATION: 100 % | BODY MASS INDEX: 37.16 KG/M2 | SYSTOLIC BLOOD PRESSURE: 104 MMHG | TEMPERATURE: 98 F | HEIGHT: 68 IN | DIASTOLIC BLOOD PRESSURE: 68 MMHG

## 2023-03-29 DIAGNOSIS — F31.75 BIPOLAR DISORDER, IN PARTIAL REMISSION, MOST RECENT EPISODE DEPRESSED (HCC): ICD-10-CM

## 2023-03-29 DIAGNOSIS — R10.33 PERIUMBILICAL ABDOMINAL PAIN: Primary | ICD-10-CM

## 2023-03-29 DIAGNOSIS — M54.50 ACUTE MIDLINE LOW BACK PAIN WITHOUT SCIATICA: ICD-10-CM

## 2023-03-29 DIAGNOSIS — Z98.890 S/P GASTRIC SURGERY: ICD-10-CM

## 2023-03-29 PROBLEM — Z90.710 S/P HYSTERECTOMY: Status: ACTIVE | Noted: 2023-03-29

## 2023-03-29 PROCEDURE — 99214 OFFICE O/P EST MOD 30 MIN: CPT | Performed by: INTERNAL MEDICINE

## 2023-03-29 RX ORDER — LAMOTRIGINE 25 MG/1
25 TABLET ORAL DAILY
COMMUNITY
Start: 2023-03-20

## 2023-03-29 NOTE — PROGRESS NOTES
1. \"Have you been to the ER, urgent care clinic since your last visit? Hospitalized since your last visit? \" Yes When: ER Downstairs both times    2. \"Have you seen or consulted any other health care providers outside of the [address] [place] [address] since your last visit? \" No     3. For patients aged 39-70: Has the patient had a colonoscopy / FIT/ Cologuard? NA - based on age      If the patient is female:    4. For patients aged 41-77: Has the patient had a mammogram within the past 2 years? NA - based on age or sex      11. For patients aged 21-65: Has the patient had a pap smear? NA - based on age or sex       Chief Complaint   Patient presents with    ED Follow-up     End of Feb and then this past Sunday. Real sever stomach cramps. Come and go. But more frequently. This past Sunday did, CT scan, blood work and urine and didn't find anything. She called her bariatric doctor and has an lucia with him on Friday. Pain is around belly button then goes to back. Has been having gas and BM.

## 2023-03-29 NOTE — PROGRESS NOTES
Dari Arias (: 1985) is a 40 y.o. female, established patient, here for evaluation of the following chief complaint(s):  ED Follow-up (End of Feb and then this past . Real sever stomach cramps. Come and go. But more frequently. This past  did, CT scan, blood work and urine and didn't find anything. She called her bariatric doctor and has an lucia with him on Friday. Pain is around belly button then goes to back. Has been having gas and BM. )      ASSESSMENT/PLAN:  Below is the assessment and plan developed based on review of pertinent history, physical exam, labs, studies, and medications. 1. Periumbilical abdominal pain  I recommend that she continue taking pantoprazole 40 mg daily and sucralfate 1 g four times a day. 2. S/P gastric surgery  She has an appointment with Yulia TALAVERA on Friday. 3. Bipolar disorder, in partial remission, most recent episode depressed ([address]  I recommend that she continue taking Lamictal 25 mg daily. 4. Acute midline low back pain without sciatica  -     URINALYSIS W/ REFLEX CULTURE; Future  I ordered a urine analysis. SUBJECTIVE/OBJECTIVE:  HPI  The patient presents today for an ER follow up. She was seen in the ER on 3/26/23 for worsening abdominal pain after her previous ER visit on 23. The CT scan and blood work did not show any significant results for both visits. Her urine sample showed presence of bacteria. She was prescribed pantoprazole 40 mg daily and sucralfate 1 g four times a day. She has not started taking sucralfate. She has been experiencing abdominal cramps intermittently but they are increased in frequency recently. The pain is around her belly button and radiates to her lower back. She denies nausea and it does not occur based on what she eats. She also denies any urinary symptoms. She has normal bowel movements and is passing gas regularly with no relief.  She had pain this morning but it was not as bad as on Sunday. Her gastric bypass was performed 3 years ago. She has an appointment with her bariatric NP on Friday. She has not had proper fluid intake today. She is not taking vitamin B12 supplements. She is taking lamictal for Bipolar  which she started taking a week ago. She has had a partial hysterectomy. Patient Active Problem List   Diagnosis Code    Posttraumatic stress disorder F43.10    Bipolar disorder, in partial remission, most recent episode depressed ([facility] F31.75    Asthma due to environmental allergies J45.909    S/P gastric surgery Z98.890        Current Outpatient Medications on File Prior to Visit   Medication Sig Dispense Refill    pantoprazole (PROTONIX) 40 mg tablet Take 1 Tablet by mouth daily for 30 days. 30 Tablet 0    sucralfate (Carafate) 1 gram tablet Take 1 Tablet by mouth four (4) times daily for 30 days. 120 Tablet 0    gabapentin (NEURONTIN) 100 mg capsule TAKE 1 CAPSULE BY MOUTH ONCE DAILY IN THE MORNING      alum-mag hydroxide-simeth (Maalox Advanced) 200-200-20 mg/5 mL susp Take 30 mL by mouth every four (4) hours as needed for Indigestion. 354 mL 0    benztropine (COGENTIN) 1 mg tablet  (Patient not taking: Reported on 2/27/2023)      Biotin 2,500 mcg cap Take  by mouth. (Patient not taking: Reported on 2/27/2023)      cyanocobalamin (VITAMIN B12) 100 mcg tablet Take 100 mcg by mouth daily. (Patient not taking: Reported on 2/27/2023)      topiramate (TOPAMAX) 25 mg tablet Take 25 mg by mouth daily (with breakfast). ARIPiprazole (Abilify Maintena) 400 mg sers 300 mg by IntraMUSCular route. (Patient not taking: Reported on 2/27/2023)       No current facility-administered medications on file prior to visit.        Allergies   Allergen Reactions    Bactrim [Sulfamethoprim Ds] Hives    Ibuprofen Hives    Tape [Adhesive] Rash       Past Medical History:   Diagnosis Date    Akathisia     Anemia     Asthma     10 yrs old    Attention deficit disorder with hyperactivity(314.01) 10/31/2013    Bipolar I disorder, most recent episode (or current) manic, unspecified 10/31/2013    [facility]    COVID-19 vaccine series completed     Moderna dose #1 received in 2/2021; dose #2 received in 3/2021    COVID-19 virus infection 08/13/2021    History of hypertension     with first pregnacy    Inability to control urination     Morbid obesity (HCC)     ELVIRA (obstructive sleep apnea)     Not on CPAP    PCOS (polycystic ovarian syndrome)     A1c 6.6 4/2015    Personality disorder ([address]      histrionic              Vear Zenyong    Posttraumatic stress disorder 10/31/2013    Prediabetes     Prior to gastric bypass    PUD (peptic ulcer disease)     Skin disease     Frequent boils    Somatization disorder 10/31/2013    Unspecified personality disorder 10/31/2013       Past Surgical History:   Procedure Laterality Date    HX CARPAL TUNNEL RELEASE      3/2022    HX GYN  2013    BTL    HX GYN      D & C    HX HYSTERECTOMY  2/25/15    Spasic at [address]     HX OTHER SURGICAL  08/31/2020    Laparoscopic gastric [place]; [place]. Jeremy Sim [place]     HX TONSILLECTOMY  2007       Family History   Problem Relation Age of Onset    Diabetes Maternal Grandmother     Lung Disease Maternal Grandfather     Cancer Paternal Grandmother         Pancreatic    Diabetes Mother     Hypertension Mother     Diabetes Father     Hypertension Father     No Known Problems Brother     No Known Problems Brother     No Known Problems Brother     No Known Problems Brother     No Known Problems Son     No Known Problems Son     Anesth Problems Neg Hx        Social History     Socioeconomic History    Marital status:      Spouse name: Not on file    Number of children: Not on file    Years of education: Not on file    Highest education level: Not on file   Occupational History    Occupation: [employer]    Tobacco Use    Smoking status: Never    Smokeless tobacco: Never   Vaping Use    Vaping Use: Never used   Substance and Sexual Activity    Alcohol use: No    Drug use: No    Sexual activity: Yes     Partners: Male     Birth control/protection: Condom   Other Topics Concern    Not on file   Social History Narrative    Lives in [place] [facility] with Mom and 2 sons     Social Determinants of Health     Financial Resource Strain: Not on file   Food Insecurity: Not on file   Transportation Needs: Not on file   Physical Activity: Not on file   Stress: Not on file   Social Connections: Not on file   Intimate Partner Violence: Not on file   Housing Stability: Not on file       Admission on 03/26/2023, Discharged on 03/26/2023   Component Date Value Ref Range Status    Color 03/26/2023 YELLOW/STRAW    Final    Color Reference Range: Straw, Yellow or Dark Yellow    Appearance 03/26/2023 CLEAR  CLEAR   Final    Specific gravity 03/26/2023 1.025  1.003 - 1.030   Final    pH (UA) 03/26/2023 6.0  5.0 - 8.0   Final    Protein 03/26/2023 Negative  NEG mg/dL Final    Glucose 03/26/2023 Negative  NEG mg/dL Final    Ketone 03/26/2023 TRACE (A)  NEG mg/dL Final    Bilirubin 03/26/2023 Negative  NEG   Final    Blood 03/26/2023 Negative  NEG   Final    Urobilinogen 03/26/2023 0.2  0.2 - 1.0 EU/dL Final    Nitrites 03/26/2023 Negative  NEG   Final    Leukocyte Esterase 03/26/2023 TRACE (A)  NEG   Final    WBC 03/26/2023 5-10  0 - 4 /hpf Final    RBC 03/26/2023 0-5  0 - 5 /hpf Final    Epithelial cells 03/26/2023 MODERATE (A)  FEW /lpf Final    Epithelial cell category consists of squamous cells and /or transitional urothelial cells. Renal tubular cells, if present, are separately identified as such. Bacteria 03/26/2023 1+ (A)  NEG /hpf Final    Mucus 03/26/2023 1+ (A)  NEG /lpf Final    Urine culture hold 03/26/2023 Urine on hold in Microbiology dept for 2 days. If unpreserved urine is submitted, it cannot be used for addtional testing after 24 hours, recollection will be required.     Final    WBC 03/26/2023 5.5 3.6 - 11.0 K/uL Final    RBC 03/26/2023 5.15  3.80 - 5.20 M/uL Final    HGB 03/26/2023 12.5  11.5 - 16.0 g/dL Final    HCT 03/26/2023 40.3  35.0 - 47.0 % Final    MCV 03/26/2023 78.3 (A)  80.0 - 99.0 FL Final    MCH 03/26/2023 24.3 (A)  26.0 - 34.0 PG Final    MCHC 03/26/2023 31.0  30.0 - 36.5 g/dL Final    RDW 03/26/2023 13.7  11.5 - 14.5 % Final    PLATELET 00/79/4346 653  150 - 400 K/uL Final    MPV 03/26/2023 10.0  8.9 - 12.9 FL Final    NRBC 03/26/2023 0.0  0  WBC Final    ABSOLUTE NRBC 03/26/2023 0.00  0.00 - 0.01 K/uL Final    NEUTROPHILS 03/26/2023 41  32 - 75 % Final    LYMPHOCYTES 03/26/2023 47  12 - 49 % Final    MONOCYTES 03/26/2023 8  5 - 13 % Final    EOSINOPHILS 03/26/2023 4  0 - 7 % Final    BASOPHILS 03/26/2023 1  0 - 1 % Final    IMMATURE GRANULOCYTES 03/26/2023 0  0.0 - 0.5 % Final    ABS. NEUTROPHILS 03/26/2023 2.3  1.8 - 8.0 K/UL Final    ABS. LYMPHOCYTES 03/26/2023 2.6  0.8 - 3.5 K/UL Final    ABS. MONOCYTES 03/26/2023 0.4  0.0 - 1.0 K/UL Final    ABS. EOSINOPHILS 03/26/2023 0.2  0.0 - 0.4 K/UL Final    ABS. BASOPHILS 03/26/2023 0.0  0.0 - 0.1 K/UL Final    ABS. IMM. GRANS. 03/26/2023 0.0  0.00 - 0.04 K/UL Final    DF 03/26/2023 AUTOMATED    Final    Sodium 03/26/2023 141  136 - 145 mmol/L Final    Potassium 03/26/2023 3.9  3.5 - 5.1 mmol/L Final    Chloride 03/26/2023 105  97 - 108 mmol/L Final    CO2 03/26/2023 25  21 - 32 mmol/L Final    Anion gap 03/26/2023 11  5 - 15 mmol/L Final    Glucose 03/26/2023 70  65 - 100 mg/dL Final    BUN 03/26/2023 8  6 - 20 MG/DL Final    Creatinine 03/26/2023 0.78  0.55 - 1.02 MG/DL Final    BUN/Creatinine ratio 03/26/2023 10 (A)  12 - 20   Final    eGFR 03/26/2023 >60  >60 ml/min/1.73m2 Final    Comment:      Pediatric calculator link: [employer].at. org/[email or web address]/gfr_calculatorped       These results are not intended for use in patients <25years of age.        eGFR results are calculated without a race factor using  the 2021 CKD-EPI equation. Careful clinical correlation is recommended, particularly when comparing to results calculated using previous equations. The CKD-EPI equation is less accurate in patients with extremes of muscle mass, extra-renal metabolism of creatinine, excessive creatine ingestion, or following therapy that affects renal tubular secretion. Calcium 03/26/2023 8.5  8.5 - 10.1 MG/DL Final    Bilirubin, total 03/26/2023 0.2  0.2 - 1.0 MG/DL Final    ALT (SGPT) 03/26/2023 14  12 - 78 U/L Final    AST (SGOT) 03/26/2023 15  15 - 37 U/L Final    Alk. phosphatase 03/26/2023 93  45 - 117 U/L Final    Protein, total 03/26/2023 7.5  6.4 - 8.2 g/dL Final    Albumin 03/26/2023 3.9  3.5 - 5.0 g/dL Final    Globulin 03/26/2023 3.6  2.0 - 4.0 g/dL Final    A-G Ratio 03/26/2023 1.1  1.1 - 2.2   Final    Lipase 03/26/2023 60 (A)  73 - 393 U/L Final   Admission on 02/27/2023, Discharged on 02/27/2023   Component Date Value Ref Range Status    Color 02/27/2023 YELLOW/STRAW    Final    Color Reference Range: Straw, Yellow or Dark Yellow    Appearance 02/27/2023 CLEAR  CLEAR   Final    Specific gravity 02/27/2023 1.020  1.003 - 1.030   Final    pH (UA) 02/27/2023 7.0  5.0 - 8.0   Final    Protein 02/27/2023 Negative  NEG mg/dL Final    Glucose 02/27/2023 Negative  NEG mg/dL Final    Ketone 02/27/2023 Negative  NEG mg/dL Final    Bilirubin 02/27/2023 Negative  NEG   Final    Blood 02/27/2023 Negative  NEG   Final    Urobilinogen 02/27/2023 1.0  0.2 - 1.0 EU/dL Final    Nitrites 02/27/2023 Negative  NEG   Final    Leukocyte Esterase 02/27/2023 Negative  NEG   Final    WBC 02/27/2023 0-4  0 - 4 /hpf Final    RBC 02/27/2023 0-5  0 - 5 /hpf Final    Epithelial cells 02/27/2023 FEW  FEW /lpf Final    Epithelial cell category consists of squamous cells and /or transitional urothelial cells. Renal tubular cells, if present, are separately identified as such.     Bacteria 02/27/2023 Negative  NEG /hpf Final    Mucus 02/27/2023 TRACE (A)  NEG /lpf Final    Lipase 02/27/2023 50 (A)  73 - 393 U/L Final    Sodium 02/27/2023 140  136 - 145 mmol/L Final    Potassium 02/27/2023 4.0  3.5 - 5.1 mmol/L Final    Chloride 02/27/2023 104  97 - 108 mmol/L Final    CO2 02/27/2023 27  21 - 32 mmol/L Final    Anion gap 02/27/2023 9  5 - 15 mmol/L Final    Glucose 02/27/2023 81  65 - 100 mg/dL Final    BUN 02/27/2023 8  6 - 20 MG/DL Final    Creatinine 02/27/2023 0.71  0.55 - 1.02 MG/DL Final    BUN/Creatinine ratio 02/27/2023 11 (A)  12 - 20   Final    eGFR 02/27/2023 >60  >60 ml/min/1.73m2 Final    Comment:      Pediatric calculator link: Diana.[email or web address] org/professionals/kdoqi/gfr_calculatorped       These results are not intended for use in patients <25years of age. eGFR results are calculated without a race factor using  the 2021 CKD-EPI equation. Careful clinical correlation is recommended, particularly when comparing to results calculated using previous equations. The CKD-EPI equation is less accurate in patients with extremes of muscle mass, extra-renal metabolism of creatinine, excessive creatine ingestion, or following therapy that affects renal tubular secretion. Calcium 02/27/2023 8.4 (A)  8.5 - 10.1 MG/DL Final    Bilirubin, total 02/27/2023 0.4  0.2 - 1.0 MG/DL Final    ALT (SGPT) 02/27/2023 11 (A)  12 - 78 U/L Final    AST (SGOT) 02/27/2023 15  15 - 37 U/L Final    Alk.  phosphatase 02/27/2023 94  45 - 117 U/L Final    Protein, total 02/27/2023 7.0  6.4 - 8.2 g/dL Final    Albumin 02/27/2023 3.6  3.5 - 5.0 g/dL Final    Globulin 02/27/2023 3.4  2.0 - 4.0 g/dL Final    A-G Ratio 02/27/2023 1.1  1.1 - 2.2   Final    WBC 02/27/2023 5.3  3.6 - 11.0 K/uL Final    RBC 02/27/2023 5.00  3.80 - 5.20 M/uL Final    HGB 02/27/2023 12.2  11.5 - 16.0 g/dL Final    HCT 02/27/2023 38.8  35.0 - 47.0 % Final    MCV 02/27/2023 77.6 (A)  80.0 - 99.0 FL Final    MCH 02/27/2023 24.4 (A)  26.0 - 34.0 PG Final    MCHC 02/27/2023 31.4  30.0 - 36.5 g/dL Final    RDW 02/27/2023 13.9  11.5 - 14.5 % Final    PLATELET 88/45/1190 850  150 - 400 K/uL Final    MPV 02/27/2023 10.1  8.9 - 12.9 FL Final    NRBC 02/27/2023 0.0  0  WBC Final    ABSOLUTE NRBC 02/27/2023 0.00  0.00 - 0.01 K/uL Final    NEUTROPHILS 02/27/2023 43  32 - 75 % Final    LYMPHOCYTES 02/27/2023 46  12 - 49 % Final    MONOCYTES 02/27/2023 6  5 - 13 % Final    EOSINOPHILS 02/27/2023 4  0 - 7 % Final    BASOPHILS 02/27/2023 1  0 - 1 % Final    IMMATURE GRANULOCYTES 02/27/2023 0  0.0 - 0.5 % Final    ABS. NEUTROPHILS 02/27/2023 2.3  1.8 - 8.0 K/UL Final    ABS. LYMPHOCYTES 02/27/2023 2.4  0.8 - 3.5 K/UL Final    ABS. MONOCYTES 02/27/2023 0.3  0.0 - 1.0 K/UL Final    ABS. EOSINOPHILS 02/27/2023 0.2  0.0 - 0.4 K/UL Final    ABS. BASOPHILS 02/27/2023 0.0  0.0 - 0.1 K/UL Final    ABS. IMM. GRANS. 02/27/2023 0.0  0.00 - 0.04 K/UL Final    DF 02/27/2023 AUTOMATED    Final     Review of Systems   Constitutional:  Negative for activity change, fatigue and unexpected weight change. HENT:  Negative for congestion, hearing loss, rhinorrhea and sore throat. Eyes:  Negative for discharge. Respiratory:  Negative for cough, chest tightness and shortness of breath. Cardiovascular:  Negative for leg swelling. Gastrointestinal:  Negative for abdominal pain, constipation and diarrhea. Genitourinary:  Negative for dysuria, flank pain, frequency and urgency. Musculoskeletal:  Negative for arthralgias, back pain and myalgias. Skin:  Negative for color change and rash. Neurological:  Negative for dizziness, light-headedness and headaches. Psychiatric/Behavioral:  Negative for dysphoric mood and sleep disturbance. The patient is not nervous/anxious.       Visit Vitals  /68 (BP 1 Location: Left upper arm, BP Patient Position: Sitting)   Pulse (!) 59   Temp 98 °F (36.7 °C) (Temporal)   Resp 18   Ht 5' 8\" (1.727 m)   Wt 245 lb 3.2 oz (111.2 kg)   LMP 02/18/2015   SpO2 100%   BMI 37.28 kg/m²       Physical Exam  Vitals and nursing note reviewed. Constitutional:       General: She is not in acute distress. Appearance: Normal appearance. She is well-developed. She is not diaphoretic. HENT:      Right Ear: External ear normal.      Left Ear: External ear normal.   Eyes:      General: No scleral icterus. Right eye: No discharge. Left eye: No discharge. Extraocular Movements: Extraocular movements intact. Conjunctiva/sclera: Conjunctivae normal.   Cardiovascular:      Rate and Rhythm: Normal rate and regular rhythm. Pulmonary:      Effort: Pulmonary effort is normal.      Breath sounds: Normal breath sounds. No wheezing. Abdominal:      General: Bowel sounds are normal.      Palpations: Abdomen is soft. Tenderness: There is no abdominal tenderness. Musculoskeletal:      Cervical back: Normal range of motion and neck supple. Lymphadenopathy:      Cervical: No cervical adenopathy. Neurological:      Mental Status: She is alert and oriented to person, place, and time. Psychiatric:         Mood and Affect: Mood and affect normal.         I, Jada Marshall MD, personally performed the services described in this documentation as scribed by Mauro Schwarz in my presence, and it is both accurate and complete. An electronic signature was used to authenticate this note.   -- Mauro Schwarz

## 2023-03-30 DIAGNOSIS — M54.50 ACUTE MIDLINE LOW BACK PAIN WITHOUT SCIATICA: ICD-10-CM

## 2023-03-31 LAB
APPEARANCE UR: ABNORMAL
BACTERIA URNS QL MICRO: ABNORMAL /HPF
BILIRUB UR QL: NEGATIVE
COLOR UR: ABNORMAL
EPITH CASTS URNS QL MICRO: ABNORMAL /LPF
GLUCOSE UR STRIP.AUTO-MCNC: NEGATIVE MG/DL
HBA1C MFR BLD HPLC: 5.1 %
HGB UR QL STRIP: NEGATIVE
HYALINE CASTS URNS QL MICRO: ABNORMAL /LPF (ref 0–5)
KETONES UR QL STRIP.AUTO: NEGATIVE MG/DL
LEUKOCYTE ESTERASE UR QL STRIP.AUTO: NEGATIVE
NITRITE UR QL STRIP.AUTO: NEGATIVE
PH UR STRIP: 5.5 (ref 5–8)
PROT UR STRIP-MCNC: ABNORMAL MG/DL
RBC #/AREA URNS HPF: ABNORMAL /HPF (ref 0–5)
SP GR UR REFRACTOMETRY: >1.03
UA: UC IF INDICATED,UAUC: ABNORMAL
UROBILINOGEN UR QL STRIP.AUTO: 0.2 EU/DL (ref 0.2–1)
WBC URNS QL MICRO: ABNORMAL /HPF (ref 0–4)

## 2023-04-02 NOTE — PROGRESS NOTES
Results reviewed. Release via [email or web address], how is your abdominal pain? Urine result came back fine.

## 2023-04-18 ENCOUNTER — APPOINTMENT (OUTPATIENT)
Dept: GENERAL RADIOLOGY | Age: 38
End: 2023-04-18
Attending: EMERGENCY MEDICINE
Payer: MEDICAID

## 2023-04-18 ENCOUNTER — HOSPITAL ENCOUNTER (EMERGENCY)
Age: 38
Discharge: HOME | End: 2023-04-18
Attending: EMERGENCY MEDICINE
Payer: MEDICAID

## 2023-04-18 VITALS
RESPIRATION RATE: 11 BRPM | TEMPERATURE: 98 F | WEIGHT: 238.32 LBS | HEART RATE: 56 BPM | SYSTOLIC BLOOD PRESSURE: 130 MMHG | OXYGEN SATURATION: 99 % | HEIGHT: 68 IN | DIASTOLIC BLOOD PRESSURE: 72 MMHG | BODY MASS INDEX: 36.12 KG/M2

## 2023-04-18 DIAGNOSIS — R10.10 UPPER ABDOMINAL PAIN: Primary | ICD-10-CM

## 2023-04-18 DIAGNOSIS — R07.9 CHEST PAIN, UNSPECIFIED TYPE: ICD-10-CM

## 2023-04-18 LAB
ALBUMIN SERPL-MCNC: 3.9 G/DL (ref 3.5–5)
ALBUMIN/GLOB SERPL: 1.2 (ref 1.1–2.2)
ALP SERPL-CCNC: 94 U/L (ref 45–117)
ALT SERPL-CCNC: 9 U/L (ref 12–78)
ANION GAP SERPL CALC-SCNC: 11 MMOL/L (ref 5–15)
AST SERPL-CCNC: 15 U/L (ref 15–37)
BASOPHILS # BLD: 0 K/UL (ref 0–0.1)
BASOPHILS NFR BLD: 1 % (ref 0–1)
BILIRUB SERPL-MCNC: 0.2 MG/DL (ref 0.2–1)
BUN SERPL-MCNC: 7 MG/DL (ref 6–20)
BUN/CREAT SERPL: 8 (ref 12–20)
CALCIUM SERPL-MCNC: 8.7 MG/DL (ref 8.5–10.1)
CHLORIDE SERPL-SCNC: 106 MMOL/L (ref 97–108)
CO2 SERPL-SCNC: 26 MMOL/L (ref 21–32)
CREAT SERPL-MCNC: 0.85 MG/DL (ref 0.55–1.02)
DIFFERENTIAL METHOD BLD: ABNORMAL
EOSINOPHIL # BLD: 0.2 K/UL (ref 0–0.4)
EOSINOPHIL NFR BLD: 3 % (ref 0–7)
ERYTHROCYTE [DISTWIDTH] IN BLOOD BY AUTOMATED COUNT: 13.7 % (ref 11.5–14.5)
GLOBULIN SER CALC-MCNC: 3.3 G/DL (ref 2–4)
GLUCOSE SERPL-MCNC: 80 MG/DL (ref 65–100)
HCT VFR BLD AUTO: 40 % (ref 35–47)
HGB BLD-MCNC: 12.4 G/DL (ref 11.5–16)
IMM GRANULOCYTES # BLD AUTO: 0 K/UL (ref 0–0.04)
IMM GRANULOCYTES NFR BLD AUTO: 0 % (ref 0–0.5)
LIPASE SERPL-CCNC: 40 U/L (ref 73–393)
LYMPHOCYTES # BLD: 2.4 K/UL (ref 0.8–3.5)
LYMPHOCYTES NFR BLD: 45 % (ref 12–49)
MCH RBC QN AUTO: 24.1 PG (ref 26–34)
MCHC RBC AUTO-ENTMCNC: 31 G/DL (ref 30–36.5)
MCV RBC AUTO: 77.7 FL (ref 80–99)
MONOCYTES # BLD: 0.4 K/UL (ref 0–1)
MONOCYTES NFR BLD: 7 % (ref 5–13)
NEUTS SEG # BLD: 2.3 K/UL (ref 1.8–8)
NEUTS SEG NFR BLD: 44 % (ref 32–75)
NRBC # BLD: 0 K/UL (ref 0–0.01)
NRBC BLD-RTO: 0 PER 100 WBC
PLATELET # BLD AUTO: 255 K/UL (ref 150–400)
PMV BLD AUTO: 11.2 FL (ref 8.9–12.9)
POTASSIUM SERPL-SCNC: 3.4 MMOL/L (ref 3.5–5.1)
PROT SERPL-MCNC: 7.2 G/DL (ref 6.4–8.2)
RBC # BLD AUTO: 5.15 M/UL (ref 3.8–5.2)
SODIUM SERPL-SCNC: 143 MMOL/L (ref 136–145)
TROPONIN I SERPL HS-MCNC: 6 NG/L (ref 0–51)
WBC # BLD AUTO: 5.3 K/UL (ref 3.6–11)

## 2023-04-18 PROCEDURE — 96374 THER/PROPH/DIAG INJ IV PUSH: CPT

## 2023-04-18 PROCEDURE — 74011250636 HC RX REV CODE- 250/636: Performed by: EMERGENCY MEDICINE

## 2023-04-18 PROCEDURE — 71045 X-RAY EXAM CHEST 1 VIEW: CPT

## 2023-04-18 PROCEDURE — 85025 COMPLETE CBC W/AUTO DIFF WBC: CPT

## 2023-04-18 PROCEDURE — 99285 EMERGENCY DEPT VISIT HI MDM: CPT

## 2023-04-18 PROCEDURE — 93005 ELECTROCARDIOGRAM TRACING: CPT

## 2023-04-18 PROCEDURE — 83690 ASSAY OF LIPASE: CPT

## 2023-04-18 PROCEDURE — 96376 TX/PRO/DX INJ SAME DRUG ADON: CPT

## 2023-04-18 PROCEDURE — 84484 ASSAY OF TROPONIN QUANT: CPT

## 2023-04-18 PROCEDURE — C9113 INJ PANTOPRAZOLE SODIUM, VIA: HCPCS | Performed by: EMERGENCY MEDICINE

## 2023-04-18 PROCEDURE — 74011250637 HC RX REV CODE- 250/637: Performed by: EMERGENCY MEDICINE

## 2023-04-18 PROCEDURE — 80053 COMPREHEN METABOLIC PANEL: CPT

## 2023-04-18 PROCEDURE — 96375 TX/PRO/DX INJ NEW DRUG ADDON: CPT

## 2023-04-18 PROCEDURE — 74011000250 HC RX REV CODE- 250: Performed by: EMERGENCY MEDICINE

## 2023-04-18 RX ORDER — FAMOTIDINE 20 MG/1
20 TABLET, FILM COATED ORAL 2 TIMES DAILY
Qty: 20 TABLET | Refills: 0 | Status: SHIPPED | OUTPATIENT
Start: 2023-04-18 | End: 2023-04-28

## 2023-04-18 RX ORDER — MORPHINE SULFATE 4 MG/ML
4 INJECTION INTRAVENOUS ONCE
Status: COMPLETED | OUTPATIENT
Start: 2023-04-18 | End: 2023-04-18

## 2023-04-18 RX ORDER — ONDANSETRON 2 MG/ML
4 INJECTION INTRAMUSCULAR; INTRAVENOUS ONCE
Status: COMPLETED | OUTPATIENT
Start: 2023-04-18 | End: 2023-04-18

## 2023-04-18 RX ORDER — MAG HYDROX/ALUMINUM HYD/SIMETH 200-200-20
30 SUSPENSION, ORAL (FINAL DOSE FORM) ORAL
Qty: 150 ML | Refills: 0 | Status: SHIPPED | OUTPATIENT
Start: 2023-04-18

## 2023-04-18 RX ORDER — MAG HYDROX/ALUMINUM HYD/SIMETH 200-200-20
30 SUSPENSION, ORAL (FINAL DOSE FORM) ORAL
Qty: 354 ML | Refills: 0 | Status: SHIPPED | OUTPATIENT
Start: 2023-04-18

## 2023-04-18 RX ADMIN — PANTOPRAZOLE SODIUM 40 MG: 40 INJECTION, POWDER, FOR SOLUTION INTRAVENOUS at 21:20

## 2023-04-18 RX ADMIN — SODIUM CHLORIDE 1000 ML: 9 INJECTION, SOLUTION INTRAVENOUS at 20:31

## 2023-04-18 RX ADMIN — MORPHINE SULFATE 4 MG: 4 INJECTION, SOLUTION INTRAMUSCULAR; INTRAVENOUS at 20:34

## 2023-04-18 RX ADMIN — MORPHINE SULFATE 4 MG: 4 INJECTION, SOLUTION INTRAMUSCULAR; INTRAVENOUS at 22:30

## 2023-04-18 RX ADMIN — Medication 40 ML: at 20:36

## 2023-04-18 RX ADMIN — ONDANSETRON 4 MG: 2 INJECTION INTRAMUSCULAR; INTRAVENOUS at 20:33

## 2023-04-18 NOTE — ED TRIAGE NOTES
Emergency Room Nursing Note        Patient Name: Deborah Sadler      : 1985             MRN: [id number]      Chief Complaint:  Chest Pain, Abdominal Pain, and Back Pain      Admit Diagnosis: No admission diagnoses are documented for this encounter. Admitting Provider: No admitting provider for patient encounter. Surgery: * No surgery found *           Patient arrived to the ER ambulatory from home with complaints of Chest Pain that started today and Stomach & Back Pain that has been off & on since February.          Lines:        Signed by: Krista Bo RN, FELIX, BSN, [facility]IA [facility]                                              2023 at 7:39 PM

## 2023-04-19 NOTE — ED NOTES
The patient left the Emergency Department ambulatory(declined wheelchair), alert and oriented and in no acute distress. The patient was encouraged to call or return to the ED for worsening issues or problems and was encouraged to schedule a follow up appointment for continuing care. The patient verbalized understanding of discharge instructions and prescriptions, all questions were answered. The patient has no further concerns at this time.

## 2023-04-19 NOTE — ED PROVIDER NOTES
44-year-old female with history of hyperplasia, anemia, asthma ADHD, bipolar disorder   ELVIRA, PCOS, PTSD, peptic ulcer disease, somatization disorder who presents with continued upper abdominal pain chest pain, back pain. Patient reports this is an ongoing problem, for which she has been seen in the ED previously. Reports that she had recent endoscopy with GI and was told that everything looks normal.  She is on PPI per GI. Patient says 3 days ago her symptoms worsened. Reports nausea, burning in her mid chest traveling up to her throat. Patient reports she is unable to sleep, and it is causing her nausea and vomiting. Denies any specific foods causing symptoms. Patient says she is in the ED because her symptoms are worsening. She wants to make sure she does not have an infection in her body.        Past Medical History:   Diagnosis Date    Akathisia     Anemia     Asthma     8 yrs old    Attention deficit disorder with hyperactivity(314.01) 10/31/2013    Bipolar I disorder, most recent episode (or current) manic, unspecified 10/31/2013    [facility]    COVID-19 vaccine series completed     Moderna dose #1 received in 2/2021; dose #2 received in 3/2021    COVID-19 virus infection 08/13/2021    History of hypertension     with first pregnacy    Inability to control urination     Morbid obesity (HCC)     ELVIRA (obstructive sleep apnea)     Not on CPAP    PCOS (polycystic ovarian syndrome)     A1c 6.6 4/2015    Personality disorder ([facility] [address]      histrionic              Shania Merritts    Posttraumatic stress disorder 10/31/2013    Prediabetes     Prior to gastric bypass    PUD (peptic ulcer disease)     Skin disease     Frequent boils    Somatization disorder 10/31/2013    Unspecified personality disorder 10/31/2013       Past Surgical History:   Procedure Laterality Date    HX CARPAL TUNNEL RELEASE      3/2022    HX GYN  2013    BTL    HX GYN      D & C    HX HYSTERECTOMY  2/25/15    Spasic at [facility]     HX OTHER SURGICAL  08/31/2020    Laparoscopic gastric bypass; Dr. Que Dunaway ([facility]). HX TONSILLECTOMY  2007         Family History:   Problem Relation Age of Onset    Diabetes Maternal Grandmother     Lung Disease Maternal Grandfather     Cancer Paternal Grandmother         Pancreatic    Diabetes Mother     Hypertension Mother     Diabetes Father     Hypertension Father     No Known Problems Brother     No Known Problems Brother     No Known Problems Brother     No Known Problems Brother     No Known Problems Son     No Known Problems Son     Anesth Problems Neg Hx        Social History     Socioeconomic History    Marital status:      Spouse name: Not on file    Number of children: Not on file    Years of education: Not on file    Highest education level: Not on file   Occupational History    Occupation: [employer] for [employer] [address]   Tobacco Use    Smoking status: Never    Smokeless tobacco: Never   Vaping Use    Vaping Use: Never used   Substance and Sexual Activity    Alcohol use: No    Drug use: No    Sexual activity: Yes     Partners: Male     Birth control/protection: Condom   Other Topics Concern    Not on file   Social History Narrative    Lives in [place] with Mom and 2 sons     Social Determinants of Health     Financial Resource Strain: Unknown    Difficulty of Paying Living Expenses: Patient refused   Food Insecurity: Unknown    Worried About Running Out of Food in the Last Year: Patient refused    Ran Out of Food in the Last Year: Patient refused   Transportation Needs: Not on file   Physical Activity: Not on file   Stress: Not on file   Social Connections: Not on file   Intimate Partner Violence: Not on file   Housing Stability: Not on file         ALLERGIES: Bactrim [sulfamethoprim ds], Ibuprofen, and Tape [adhesive]    Review of Systems   Constitutional:  Negative for chills and fever. HENT:  Negative for drooling and nosebleeds.     Eyes:  Negative for pain and itching. Respiratory:  Negative for choking and stridor. Cardiovascular:  Positive for chest pain. Negative for leg swelling. Gastrointestinal:  Positive for abdominal pain. Negative for abdominal distention and rectal pain. Endocrine: Negative for heat intolerance and polyphagia. Genitourinary:  Negative for enuresis and genital sores. Musculoskeletal:  Positive for back pain. Negative for arthralgias and joint swelling. Skin:  Negative for color change. Allergic/Immunologic: Negative for immunocompromised state. Neurological:  Negative for tremors and speech difficulty. Hematological:  Negative for adenopathy. Psychiatric/Behavioral:  Negative for dysphoric mood and sleep disturbance. Vitals:    04/18/23 1941 04/18/23 1946   BP: (!) 133/94    Pulse: 64    Resp: 18    Temp: 98 °F (36.7 °C)    SpO2: 100% 100%   Weight: 108.1 kg (238 lb 5.1 oz)    Height: 5' 8\" (1.727 m)             Physical Exam  Vitals and nursing note reviewed. Constitutional:       General: She is in acute distress. Appearance: She is well-developed. She is not ill-appearing, toxic-appearing or diaphoretic. HENT:      Head: Normocephalic. Nose: Nose normal.   Eyes:      Conjunctiva/sclera: Conjunctivae normal.   Cardiovascular:      Rate and Rhythm: Normal rate and regular rhythm. Pulmonary:      Effort: Pulmonary effort is normal. No respiratory distress. Breath sounds: Normal breath sounds. Abdominal:      General: There is no distension. Palpations: Abdomen is soft. Tenderness: There is abdominal tenderness. There is no guarding or rebound. Musculoskeletal:         General: No deformity. Normal range of motion. Cervical back: Normal range of motion and neck supple. Skin:     General: Skin is warm and dry. Neurological:      Mental Status: She is alert and oriented to person, place, and time.       Coordination: Coordination normal.   Psychiatric:         Behavior: Behavior normal.        Medical Decision Making  Exam and history consistent with recurrence of patient's gastritis that was previously noted on CT scan of abdomen when patient came to the ED for similar symptoms. She reports that this has been ongoing since February, and likely result of gastric bypass surgery she had in 2020. Notes that she is on PPI per her GI doctor but has not contacted them since symptoms worsened 3 days ago. Her abdomen is nonperitoneal, does not warrant reimaging at this time. Work-up today for ACS is negative, and referral for cardiology provided. Does not warrant hospitalization at this time, remains well-appearing. Will add Pepcid and Maalox to current regimen. Stable for discharge. Amount and/or Complexity of Data Reviewed  External Data Reviewed: notes. Labs: ordered. Decision-making details documented in ED Course. Radiology: ordered and independent interpretation performed. Decision-making details documented in ED Course. ECG/medicine tests: ordered and independent interpretation performed. Decision-making details documented in ED Course. Risk  OTC drugs. Prescription drug management. Parenteral controlled substances. Decision regarding hospitalization. ED Course as of 04/19/23 0220 Tue Apr 18, 2023 2116 ED EKG interpretation:  Rhythm: normal sinus rhythm; and regular . Rate (approx.): 59; Axis: normal; ST/T wave: normal; No evidence of acute coronary ischemia. [AL]   2103 Pt reports improvement in pain. Will FU w/her GI MD at [facility]. Will add on pepcid and Maalox to her current regimen of PPI. She says she has sucralfate from prior ED visit. Work up negative for ACS today. Given chest pain, will provide referral to cardiology. Pt reports this is an ongoing issue since gastric bypass in 2020 but it worsened starting February. [AL]      ED Course User Index  [AL] Lamar iTwari MD       Procedures    Patient's results have been reviewed with them. Patient and/or family have verbally conveyed their understanding and agreement of the patient's signs, symptoms, diagnosis, treatment and prognosis and additionally agree to follow up as recommended or return to the Emergency Room should their condition change prior to follow-up. Discharge instructions have also been provided to the patient with some educational information regarding their diagnosis as well a list of reasons why they would want to return to the ER prior to their follow-up appointment should their condition change.

## 2023-04-19 NOTE — DISCHARGE INSTRUCTIONS
This pain is likely a gastritis which has been ongoing for several months. You can add Maalox and Pepcid to your current regimen. Discussed with the GI doctor. Your blood work today showed he did not have a heart attack. Follow-up with cardiology for chest pain continues. Thank you.

## 2023-04-19 NOTE — ED NOTES
Patient reports nausea is persist, but able to take oral medications without vomiting. Reports no difference in pain.

## 2023-04-22 LAB
ATRIAL RATE: 59 BPM
CALCULATED P AXIS, ECG09: 53 DEGREES
CALCULATED R AXIS, ECG10: -49 DEGREES
CALCULATED T AXIS, ECG11: -89 DEGREES
DIAGNOSIS, 93000: NORMAL
P-R INTERVAL, ECG05: 158 MS
Q-T INTERVAL, ECG07: 318 MS
QRS DURATION, ECG06: 96 MS
QTC CALCULATION (BEZET), ECG08: 314 MS
VENTRICULAR RATE, ECG03: 59 BPM

## 2023-05-10 ENCOUNTER — TELEPHONE (OUTPATIENT)
Dept: PRIMARY CARE CLINIC | Facility: CLINIC | Age: 38
End: 2023-05-10

## 2023-05-10 NOTE — TELEPHONE ENCOUNTER
----- Message from Jyana Del Toro sent at 5/10/2023  8:46 AM EDT -----  Subject: Appointment Request    Reason for Call: Established Patient Appointment needed: Urgent (Patient   Request) Back or Neck Pain    QUESTIONS    Reason for appointment request? No appointments available during search     Additional Information for Provider? PT IS STILL HAVING LOW BACK PAIN.  HER   BACK PAIN STARTED IN FEB AND SHE HAS BEEN TO ER 3 TIMES SINCE THEN ,NEEDS   AN APPT ASAP ,NO AVAILABLE APPT TIL 5-   ---------------------------------------------------------------------------  --------------  Read [place] JACQUESDA  [phone]; OK to leave message on voicemail  ---------------------------------------------------------------------------  --------------  SCRIPT ANSWERS  COVID Screen: Hortencia Teran

## 2023-07-11 ENCOUNTER — OFFICE VISIT (OUTPATIENT)
Dept: PRIMARY CARE CLINIC | Facility: CLINIC | Age: 38
End: 2023-07-11
Payer: MEDICAID

## 2023-07-11 VITALS
WEIGHT: 235 LBS | RESPIRATION RATE: 18 BRPM | OXYGEN SATURATION: 99 % | HEART RATE: 77 BPM | SYSTOLIC BLOOD PRESSURE: 119 MMHG | DIASTOLIC BLOOD PRESSURE: 77 MMHG | HEIGHT: 68 IN | BODY MASS INDEX: 35.61 KG/M2 | TEMPERATURE: 97.8 F

## 2023-07-11 DIAGNOSIS — Z00.00 ENCOUNTER FOR WELL ADULT EXAM WITHOUT ABNORMAL FINDINGS: ICD-10-CM

## 2023-07-11 DIAGNOSIS — E66.01 SEVERE OBESITY (HCC): ICD-10-CM

## 2023-07-11 DIAGNOSIS — Z00.00 PHYSICAL EXAM: ICD-10-CM

## 2023-07-11 DIAGNOSIS — E53.8 B12 DEFICIENCY: ICD-10-CM

## 2023-07-11 DIAGNOSIS — Z28.39 LAPSED IMMUNIZATION SCHEDULE STATUS: ICD-10-CM

## 2023-07-11 DIAGNOSIS — K42.9 PERIUMBILICAL HERNIA: Primary | ICD-10-CM

## 2023-07-11 DIAGNOSIS — R19.5 LOOSE BOWEL MOVEMENTS: ICD-10-CM

## 2023-07-11 DIAGNOSIS — F43.10 POSTTRAUMATIC STRESS DISORDER: ICD-10-CM

## 2023-07-11 DIAGNOSIS — E55.9 VITAMIN D DEFICIENCY: ICD-10-CM

## 2023-07-11 LAB
25(OH)D3 SERPL-MCNC: 28.5 NG/ML (ref 30–100)
ALBUMIN SERPL-MCNC: 3.4 G/DL (ref 3.5–5)
ALBUMIN/GLOB SERPL: 1.2 (ref 1.1–2.2)
ALP SERPL-CCNC: 76 U/L (ref 45–117)
ALT SERPL-CCNC: 17 U/L (ref 12–78)
ANION GAP SERPL CALC-SCNC: 3 MMOL/L (ref 5–15)
AST SERPL-CCNC: 13 U/L (ref 15–37)
BILIRUB SERPL-MCNC: 0.3 MG/DL (ref 0.2–1)
BUN SERPL-MCNC: 10 MG/DL (ref 6–20)
BUN/CREAT SERPL: 16 (ref 12–20)
CALCIUM SERPL-MCNC: 8.5 MG/DL (ref 8.5–10.1)
CHLORIDE SERPL-SCNC: 110 MMOL/L (ref 97–108)
CHOLEST SERPL-MCNC: 111 MG/DL
CO2 SERPL-SCNC: 29 MMOL/L (ref 21–32)
CREAT SERPL-MCNC: 0.62 MG/DL (ref 0.55–1.02)
ERYTHROCYTE [DISTWIDTH] IN BLOOD BY AUTOMATED COUNT: 14.3 % (ref 11.5–14.5)
GLOBULIN SER CALC-MCNC: 2.8 G/DL (ref 2–4)
GLUCOSE SERPL-MCNC: 72 MG/DL (ref 65–100)
HCT VFR BLD AUTO: 36 % (ref 35–47)
HDLC SERPL-MCNC: 53 MG/DL
HDLC SERPL: 2.1 (ref 0–5)
HGB BLD-MCNC: 10.8 G/DL (ref 11.5–16)
LDLC SERPL CALC-MCNC: 50.8 MG/DL (ref 0–100)
MCH RBC QN AUTO: 23.9 PG (ref 26–34)
MCHC RBC AUTO-ENTMCNC: 30 G/DL (ref 30–36.5)
MCV RBC AUTO: 79.8 FL (ref 80–99)
NRBC # BLD: 0 K/UL (ref 0–0.01)
NRBC BLD-RTO: 0 PER 100 WBC
PLATELET # BLD AUTO: 242 K/UL (ref 150–400)
PMV BLD AUTO: 11 FL (ref 8.9–12.9)
POTASSIUM SERPL-SCNC: 3.8 MMOL/L (ref 3.5–5.1)
PROT SERPL-MCNC: 6.2 G/DL (ref 6.4–8.2)
RBC # BLD AUTO: 4.51 M/UL (ref 3.8–5.2)
SODIUM SERPL-SCNC: 142 MMOL/L (ref 136–145)
TRIGL SERPL-MCNC: 36 MG/DL
TSH SERPL DL<=0.05 MIU/L-ACNC: 0.75 UIU/ML (ref 0.36–3.74)
VIT B12 SERPL-MCNC: >2000 PG/ML (ref 193–986)
VLDLC SERPL CALC-MCNC: 7.2 MG/DL
WBC # BLD AUTO: 4.6 K/UL (ref 3.6–11)

## 2023-07-11 PROCEDURE — 99214 OFFICE O/P EST MOD 30 MIN: CPT | Performed by: INTERNAL MEDICINE

## 2023-07-11 RX ORDER — ERGOCALCIFEROL 1.25 MG/1
50000 CAPSULE ORAL WEEKLY
Qty: 4 CAPSULE | Refills: 0 | Status: SHIPPED | OUTPATIENT
Start: 2023-07-11

## 2023-07-11 RX ORDER — CYANOCOBALAMIN 1000 UG/ML
1000 INJECTION, SOLUTION INTRAMUSCULAR; SUBCUTANEOUS ONCE
Status: COMPLETED | OUTPATIENT
Start: 2023-07-11 | End: 2023-07-11

## 2023-07-11 RX ADMIN — CYANOCOBALAMIN 1000 MCG: 1000 INJECTION, SOLUTION INTRAMUSCULAR; SUBCUTANEOUS at 13:40

## 2023-07-11 SDOH — ECONOMIC STABILITY: HOUSING INSECURITY
IN THE LAST 12 MONTHS, WAS THERE A TIME WHEN YOU DID NOT HAVE A STEADY PLACE TO SLEEP OR SLEPT IN A SHELTER (INCLUDING NOW)?: NO

## 2023-07-11 SDOH — ECONOMIC STABILITY: FOOD INSECURITY: WITHIN THE PAST 12 MONTHS, YOU WORRIED THAT YOUR FOOD WOULD RUN OUT BEFORE YOU GOT MONEY TO BUY MORE.: SOMETIMES TRUE

## 2023-07-11 SDOH — ECONOMIC STABILITY: INCOME INSECURITY: HOW HARD IS IT FOR YOU TO PAY FOR THE VERY BASICS LIKE FOOD, HOUSING, MEDICAL CARE, AND HEATING?: SOMEWHAT HARD

## 2023-07-11 SDOH — ECONOMIC STABILITY: FOOD INSECURITY: WITHIN THE PAST 12 MONTHS, THE FOOD YOU BOUGHT JUST DIDN'T LAST AND YOU DIDN'T HAVE MONEY TO GET MORE.: SOMETIMES TRUE

## 2023-07-11 ASSESSMENT — ENCOUNTER SYMPTOMS
BACK PAIN: 0
RHINORRHEA: 0
CHEST TIGHTNESS: 0
SORE THROAT: 0
ABDOMINAL PAIN: 0
CONSTIPATION: 0
COLOR CHANGE: 0
COUGH: 0
SHORTNESS OF BREATH: 0
DIARRHEA: 1
EYE DISCHARGE: 0

## 2023-07-11 ASSESSMENT — PATIENT HEALTH QUESTIONNAIRE - PHQ9
SUM OF ALL RESPONSES TO PHQ QUESTIONS 1-9: 0
SUM OF ALL RESPONSES TO PHQ QUESTIONS 1-9: 0
SUM OF ALL RESPONSES TO PHQ9 QUESTIONS 1 & 2: 0
1. LITTLE INTEREST OR PLEASURE IN DOING THINGS: 0
SUM OF ALL RESPONSES TO PHQ QUESTIONS 1-9: 0
SUM OF ALL RESPONSES TO PHQ QUESTIONS 1-9: 0
2. FEELING DOWN, DEPRESSED OR HOPELESS: 0

## 2023-07-13 LAB — VZV IGG SER IA-ACNC: 706 INDEX

## 2024-01-19 ENCOUNTER — OFFICE VISIT (OUTPATIENT)
Dept: PRIMARY CARE CLINIC | Facility: CLINIC | Age: 39
End: 2024-01-19
Payer: MEDICAID

## 2024-01-19 VITALS
HEIGHT: 68 IN | BODY MASS INDEX: 33.8 KG/M2 | RESPIRATION RATE: 18 BRPM | HEART RATE: 71 BPM | WEIGHT: 223 LBS | OXYGEN SATURATION: 100 % | DIASTOLIC BLOOD PRESSURE: 60 MMHG | SYSTOLIC BLOOD PRESSURE: 110 MMHG | TEMPERATURE: 97.1 F

## 2024-01-19 DIAGNOSIS — Z98.84 S/P GASTRIC BYPASS: ICD-10-CM

## 2024-01-19 DIAGNOSIS — F32.89 OTHER DEPRESSION: ICD-10-CM

## 2024-01-19 DIAGNOSIS — J30.89 SEASONAL ALLERGIC RHINITIS DUE TO OTHER ALLERGIC TRIGGER: ICD-10-CM

## 2024-01-19 DIAGNOSIS — N30.01 ACUTE CYSTITIS WITH HEMATURIA: ICD-10-CM

## 2024-01-19 DIAGNOSIS — K90.89 OTHER SPECIFIED INTESTINAL MALABSORPTION: ICD-10-CM

## 2024-01-19 DIAGNOSIS — R53.82 CHRONIC FATIGUE: Primary | ICD-10-CM

## 2024-01-19 PROCEDURE — 99214 OFFICE O/P EST MOD 30 MIN: CPT | Performed by: INTERNAL MEDICINE

## 2024-01-19 RX ORDER — AZELASTINE 1 MG/ML
1 SPRAY, METERED NASAL 2 TIMES DAILY
Qty: 60 ML | Refills: 1 | Status: SHIPPED | OUTPATIENT
Start: 2024-01-19

## 2024-01-19 RX ORDER — BUPROPION HYDROCHLORIDE 100 MG/1
100 TABLET, EXTENDED RELEASE ORAL 2 TIMES DAILY
Qty: 60 TABLET | Refills: 1 | Status: SHIPPED | OUTPATIENT
Start: 2024-01-19 | End: 2024-05-18

## 2024-01-19 ASSESSMENT — PATIENT HEALTH QUESTIONNAIRE - PHQ9
9. THOUGHTS THAT YOU WOULD BE BETTER OFF DEAD, OR OF HURTING YOURSELF: 0
4. FEELING TIRED OR HAVING LITTLE ENERGY: 3
SUM OF ALL RESPONSES TO PHQ QUESTIONS 1-9: 14
5. POOR APPETITE OR OVEREATING: 3
1. LITTLE INTEREST OR PLEASURE IN DOING THINGS: 3
SUM OF ALL RESPONSES TO PHQ9 QUESTIONS 1 & 2: 6
SUM OF ALL RESPONSES TO PHQ QUESTIONS 1-9: 14
SUM OF ALL RESPONSES TO PHQ QUESTIONS 1-9: 14
2. FEELING DOWN, DEPRESSED OR HOPELESS: 3
SUM OF ALL RESPONSES TO PHQ QUESTIONS 1-9: 14
8. MOVING OR SPEAKING SO SLOWLY THAT OTHER PEOPLE COULD HAVE NOTICED. OR THE OPPOSITE, BEING SO FIGETY OR RESTLESS THAT YOU HAVE BEEN MOVING AROUND A LOT MORE THAN USUAL: 0
6. FEELING BAD ABOUT YOURSELF - OR THAT YOU ARE A FAILURE OR HAVE LET YOURSELF OR YOUR FAMILY DOWN: 0
7. TROUBLE CONCENTRATING ON THINGS, SUCH AS READING THE NEWSPAPER OR WATCHING TELEVISION: 1
10. IF YOU CHECKED OFF ANY PROBLEMS, HOW DIFFICULT HAVE THESE PROBLEMS MADE IT FOR YOU TO DO YOUR WORK, TAKE CARE OF THINGS AT HOME, OR GET ALONG WITH OTHER PEOPLE: 1
3. TROUBLE FALLING OR STAYING ASLEEP: 1

## 2024-01-19 ASSESSMENT — ENCOUNTER SYMPTOMS
COUGH: 0
CONSTIPATION: 0
BACK PAIN: 0
SORE THROAT: 0
SHORTNESS OF BREATH: 0
RHINORRHEA: 1

## 2024-01-19 NOTE — PROGRESS NOTES
\"Have you been to the ER, urgent care clinic since your last visit?  Hospitalized since your last visit?\"    ER         “Have you seen or consulted any other health care providers outside of [facility] since your last visit?”    [facility]   Bariatric Doctor       Chief Complaint   Patient presents with    Follow-up    Congestion     Has been congested for 2 months and can't taste when she is congested bad. Runny nose but still can breath through her nose. Thick green stuff last night. Headache.          Pt is ok with scribe.  
    histrionic              Paris Jayne    Posttraumatic stress disorder 10/31/2013    Prediabetes     Prior to gastric bypass    PUD (peptic ulcer disease)     Skin disease     Frequent boils    Somatization disorder 10/31/2013    Unspecified personality disorder 10/31/2013       Past Surgical History:   Procedure Laterality Date    CARPAL TUNNEL RELEASE      3/2022    CHOLECYSTECTOMY      GYN  2013    BTL    GYN      D & C    HERNIA REPAIR      HYSTERECTOMY (CERVIX STATUS UNKNOWN)  2/25/15    Spasic at [facility]     OTHER SURGICAL HISTORY  08/31/2020    Laparoscopic gastric bypass; Dr. Bonilla ([facility]).    TONSILLECTOMY  2007       Family History   Problem Relation Age of Onset    Hypertension Mother     Diabetes Father     Diabetes Mother     Cancer Paternal Grandmother         Pancreatic    Lung Disease Maternal Grandfather     Diabetes Maternal Grandmother     Anesth Problems Neg Hx     No Known Problems Son     No Known Problems Son     No Known Problems Brother     No Known Problems Brother     No Known Problems Brother     No Known Problems Brother     Hypertension Father        Social History     Socioeconomic History    Marital status:      Spouse name: Not on file    Number of children: Not on file    Years of education: Not on file    Highest education level: Not on file   Occupational History    Not on file   Tobacco Use    Smoking status: Never    Smokeless tobacco: Never   Substance and Sexual Activity    Alcohol use: No    Drug use: No    Sexual activity: Not on file   Other Topics Concern    Not on file   Social History Narrative    Lives in [place] with Mom and 2 sons     Social Determinants of Health     Financial Resource Strain: Medium Risk (7/11/2023)    Overall Financial Resource Strain (CARDIA)     Difficulty of Paying Living Expenses: Somewhat hard   Food Insecurity: Not on file (7/11/2023)   Recent Concern: Food Insecurity - Food Insecurity Present (7/11/2023)    Hunger Vital

## 2024-04-03 ENCOUNTER — TELEPHONE (OUTPATIENT)
Dept: PRIMARY CARE CLINIC | Facility: CLINIC | Age: 39
End: 2024-04-03

## 2024-04-03 NOTE — TELEPHONE ENCOUNTER
Called and spoke to pt. She is still at the hospital and thinks she is leaving today. I offered her an may for the 10th and she said she needs evening may time. I told her we take last pt at 345pm. She said she doesn't get off until 330pm and asked about the following Wednesday. I said the latest is 345pm. I asked what time does she go in? She said that time varies and I'm supposed to go back to work Monday. She then asked if she can call me back because the doctor came into her room. I said yes, you can call back or send a Zapya message. She said ok, thank you.

## 2024-07-15 ENCOUNTER — TRANSCRIBE ORDERS (OUTPATIENT)
Facility: HOSPITAL | Age: 39
End: 2024-07-15

## 2024-07-15 DIAGNOSIS — N63.0 MASS OF BREAST, UNSPECIFIED LATERALITY: Primary | ICD-10-CM

## 2024-07-16 ENCOUNTER — OFFICE VISIT (OUTPATIENT)
Dept: PRIMARY CARE CLINIC | Facility: CLINIC | Age: 39
End: 2024-07-16
Payer: MEDICAID

## 2024-07-16 VITALS
RESPIRATION RATE: 17 BRPM | WEIGHT: 208.8 LBS | BODY MASS INDEX: 31.64 KG/M2 | HEIGHT: 68 IN | SYSTOLIC BLOOD PRESSURE: 101 MMHG | HEART RATE: 90 BPM | OXYGEN SATURATION: 99 % | TEMPERATURE: 97.6 F | DIASTOLIC BLOOD PRESSURE: 66 MMHG

## 2024-07-16 DIAGNOSIS — K95.89: ICD-10-CM

## 2024-07-16 DIAGNOSIS — F31.75 BIPOLAR DISORDER, IN PARTIAL REMISSION, MOST RECENT EPISODE DEPRESSED (HCC): Primary | ICD-10-CM

## 2024-07-16 DIAGNOSIS — E55.9 VITAMIN D DEFICIENCY: ICD-10-CM

## 2024-07-16 DIAGNOSIS — E53.8 B12 DEFICIENCY: ICD-10-CM

## 2024-07-16 PROCEDURE — 99214 OFFICE O/P EST MOD 30 MIN: CPT | Performed by: INTERNAL MEDICINE

## 2024-07-16 RX ORDER — CYANOCOBALAMIN 1000 UG/ML
1000 INJECTION, SOLUTION INTRAMUSCULAR; SUBCUTANEOUS ONCE
Status: COMPLETED | OUTPATIENT
Start: 2024-07-16 | End: 2024-07-16

## 2024-07-16 RX ADMIN — CYANOCOBALAMIN 1000 MCG: 1000 INJECTION, SOLUTION INTRAMUSCULAR; SUBCUTANEOUS at 13:31

## 2024-07-16 SDOH — ECONOMIC STABILITY: FOOD INSECURITY: WITHIN THE PAST 12 MONTHS, YOU WORRIED THAT YOUR FOOD WOULD RUN OUT BEFORE YOU GOT MONEY TO BUY MORE.: SOMETIMES TRUE

## 2024-07-16 SDOH — ECONOMIC STABILITY: FOOD INSECURITY: WITHIN THE PAST 12 MONTHS, THE FOOD YOU BOUGHT JUST DIDN'T LAST AND YOU DIDN'T HAVE MONEY TO GET MORE.: SOMETIMES TRUE

## 2024-07-16 SDOH — ECONOMIC STABILITY: INCOME INSECURITY: HOW HARD IS IT FOR YOU TO PAY FOR THE VERY BASICS LIKE FOOD, HOUSING, MEDICAL CARE, AND HEATING?: SOMEWHAT HARD

## 2024-07-16 ASSESSMENT — PATIENT HEALTH QUESTIONNAIRE - PHQ9
5. POOR APPETITE OR OVEREATING: SEVERAL DAYS
SUM OF ALL RESPONSES TO PHQ QUESTIONS 1-9: 13
SUM OF ALL RESPONSES TO PHQ9 QUESTIONS 1 & 2: 6
10. IF YOU CHECKED OFF ANY PROBLEMS, HOW DIFFICULT HAVE THESE PROBLEMS MADE IT FOR YOU TO DO YOUR WORK, TAKE CARE OF THINGS AT HOME, OR GET ALONG WITH OTHER PEOPLE: SOMEWHAT DIFFICULT
1. LITTLE INTEREST OR PLEASURE IN DOING THINGS: NEARLY EVERY DAY
3. TROUBLE FALLING OR STAYING ASLEEP: SEVERAL DAYS
SUM OF ALL RESPONSES TO PHQ QUESTIONS 1-9: 13
8. MOVING OR SPEAKING SO SLOWLY THAT OTHER PEOPLE COULD HAVE NOTICED. OR THE OPPOSITE, BEING SO FIGETY OR RESTLESS THAT YOU HAVE BEEN MOVING AROUND A LOT MORE THAN USUAL: NOT AT ALL
SUM OF ALL RESPONSES TO PHQ QUESTIONS 1-9: 13
6. FEELING BAD ABOUT YOURSELF - OR THAT YOU ARE A FAILURE OR HAVE LET YOURSELF OR YOUR FAMILY DOWN: NEARLY EVERY DAY
9. THOUGHTS THAT YOU WOULD BE BETTER OFF DEAD, OR OF HURTING YOURSELF: NOT AT ALL
4. FEELING TIRED OR HAVING LITTLE ENERGY: SEVERAL DAYS
SUM OF ALL RESPONSES TO PHQ QUESTIONS 1-9: 13
7. TROUBLE CONCENTRATING ON THINGS, SUCH AS READING THE NEWSPAPER OR WATCHING TELEVISION: SEVERAL DAYS
2. FEELING DOWN, DEPRESSED OR HOPELESS: NEARLY EVERY DAY

## 2024-07-16 ASSESSMENT — ENCOUNTER SYMPTOMS
RHINORRHEA: 0
SHORTNESS OF BREATH: 0
EYE DISCHARGE: 0
COLOR CHANGE: 0
CONSTIPATION: 0
COUGH: 0
DIARRHEA: 0
ABDOMINAL PAIN: 0
BACK PAIN: 0
SORE THROAT: 0
CHEST TIGHTNESS: 0

## 2024-07-16 NOTE — PROGRESS NOTES
Michelle Hart (:  1985) is a 39 y.o. female, Established patient, here for evaluation of the following chief complaint(s):  Follow-Up from Hospital (Was in hospital for suicide attempt. Released on .)        Assessment & Plan   ASSESSMENT/PLAN:  1. Bipolar disorder, in partial remission, most recent episode depressed (HCC)  Pt will follow up with her new Psychiatrist this week. Pt will continue current regimen of no longer taking pill medication.    2. Herniated gastric pouch as complication of bariatric surgery  Pt is followed by Gastroenterologist. Pt should continue current regimen.     3. B12 deficiency  -     cyanocobalamin injection 1,000 mcg; 1,000 mcg, IntraMUSCular, ONCE, 1 dose, On 24 at 1345  Pt received a B12 injection in office today.    4. Vitamin D deficiency  Recommend that patient take a Vitamin D supplement of 1,000 units daily.           Subjective   SUBJECTIVE/OBJECTIVE:  HPI    Pt presents today for a hospital follow up for suicide attempt released .    Pt was working at [employer] in March she began lethargic and sick. Pt was malnourish. She was eating but the food wouldn't stay down and she ws throwing up. Pt ended up getting a feeding tube which failed 3x.  The first two times was a GI dysfunction. She had to continue to get them removed and then replaced. The 3rd one she received it wouldn't lock and began to leak so she asked to have that one removed. She is semi eating she eats okay. She states depending on what it is she can keep it down but she doesn't really have an appetite.   Pt has a GI appointment with [facility]. She will get an endoscopy.     pt had blood work completed where vitamin D and Vitamin B12 levels were low. Pt stated she felt very tired. Pt hasn't been taking any of her vitamins.    She had a suicide attempt last  she took a handful about 20 pills  of Wellbutrin 150mg . She states she went to sleep and then when she woke up she began

## 2024-07-16 NOTE — PROGRESS NOTES
Opportunity given for questions and concerns provided. No concerns at this time    Immunizations administered to patient 7/16/2024 by Ally Romero LPN with consent.Patient tolerated procedure well. No reactions noted.    Come back in SEPT- per Dr Mao B12 every other month

## 2024-07-16 NOTE — PROGRESS NOTES
Health Decision Maker has been checked with the patient          Chief Complaint   Patient presents with    Follow-Up from Hospital     Was in hospital for suicide attempt. Released on July 9th.       \"Have you been to the ER, urgent care clinic since your last visit?  Hospitalized since your last visit?\"    YES - When: approximately 1  weeks ago.  Where and Why: suicide attempt, [facility] .    “Have you seen or consulted any other health care providers outside of [facility] since your last visit?”    YES - When: approximately 1  weeks ago.  Where and Why: [facility].      PT STATED SHE GIVES PERMISSION FOR SCRIBE TO BE IN ROOM.     DID NOT WANT WATER WHEN ASKED.    PT GIVEN [facility] RESOURCES.             Click Here for Release of Records Request

## 2024-08-19 ENCOUNTER — HOSPITAL ENCOUNTER (OUTPATIENT)
Facility: HOSPITAL | Age: 39
Discharge: HOME | End: 2024-08-22
Payer: MEDICAID

## 2024-08-19 VITALS — BODY MASS INDEX: 32.69 KG/M2 | WEIGHT: 215 LBS

## 2024-08-19 DIAGNOSIS — N63.0 MASS OF BREAST, UNSPECIFIED LATERALITY: ICD-10-CM

## 2024-08-19 PROCEDURE — G0279 TOMOSYNTHESIS, MAMMO: HCPCS

## 2024-08-19 PROCEDURE — 76642 ULTRASOUND BREAST LIMITED: CPT

## 2024-08-28 ENCOUNTER — TELEPHONE (OUTPATIENT)
Dept: PRIMARY CARE CLINIC | Facility: CLINIC | Age: 39
End: 2024-08-28

## 2024-08-28 NOTE — TELEPHONE ENCOUNTER
Called pt, no answer. Message left that we have her on the schedule today at 1:00pm and to call back, thank you.

## 2024-09-06 ENCOUNTER — TELEPHONE (OUTPATIENT)
Dept: PRIMARY CARE CLINIC | Facility: CLINIC | Age: 39
End: 2024-09-06

## 2024-09-06 NOTE — TELEPHONE ENCOUNTER
Called pt and no answer. Message left to call our office back because we have you on the schedule at 8:30am and this is your second no show.

## 2024-11-04 ENCOUNTER — OFFICE VISIT (OUTPATIENT)
Age: 39
End: 2024-11-04

## 2024-11-04 VITALS
TEMPERATURE: 99.2 F | RESPIRATION RATE: 18 BRPM | DIASTOLIC BLOOD PRESSURE: 79 MMHG | WEIGHT: 226.6 LBS | SYSTOLIC BLOOD PRESSURE: 115 MMHG | HEART RATE: 107 BPM | HEIGHT: 67 IN | BODY MASS INDEX: 35.56 KG/M2 | OXYGEN SATURATION: 97 %

## 2024-11-04 DIAGNOSIS — R05.9 COUGH, UNSPECIFIED TYPE: ICD-10-CM

## 2024-11-04 DIAGNOSIS — J06.9 VIRAL UPPER RESPIRATORY TRACT INFECTION: Primary | ICD-10-CM

## 2024-11-04 LAB
Lab: NORMAL
PERFORMING INSTRUMENT: NORMAL
QC PASS/FAIL: NORMAL
SARS-COV-2, POC: NORMAL

## 2024-11-04 RX ORDER — FEXOFENADINE HCL 180 MG/1
180 TABLET ORAL DAILY
Qty: 30 TABLET | Refills: 0 | Status: SHIPPED | OUTPATIENT
Start: 2024-11-04 | End: 2024-12-04

## 2024-11-04 RX ORDER — DEXTROMETHORPHAN HYDROBROMIDE AND PROMETHAZINE HYDROCHLORIDE 15; 6.25 MG/5ML; MG/5ML
5 SYRUP ORAL 4 TIMES DAILY PRN
Qty: 100 ML | Refills: 0 | Status: SHIPPED | OUTPATIENT
Start: 2024-11-04 | End: 2024-11-09

## 2024-11-04 RX ORDER — PALIPERIDONE 3 MG/1
6 TABLET, EXTENDED RELEASE ORAL EVERY MORNING
COMMUNITY

## 2024-11-04 RX ORDER — BENZONATATE 200 MG/1
200 CAPSULE ORAL 2 TIMES DAILY PRN
Qty: 14 CAPSULE | Refills: 0 | Status: SHIPPED | OUTPATIENT
Start: 2024-11-04 | End: 2024-11-11

## 2024-11-04 NOTE — PROGRESS NOTES
Subjective   History was provided by the patient.    Michelle Hart is a 39 y.o. female who presents for evaluation of symptoms of a URI. Symptoms include nasal congestion, post nasal drip, sore throat, and non-productive cough. Onset of symptoms was 1 day ago, and is gradually worsening since that time. Evaluation to date: none. Treatment to date: Dayquil.       Objective   Physical Exam   General: alert, appears stated age, and cooperative  Ear exam: normal TM's and external ear canals both ears  Sinus exam: Normal paranasal sinuses without tenderness  Oropharynx exam: normal findings: pink and moist  Neck exam: no adenopathy  Heart exam: S1 and S2 normal, tachycardic  Lung exam: clear to auscultation bilaterally      Assessment   1. Cough, unspecified type  - POCT COVID-19, Antigen  Results for orders placed or performed in visit on 11/04/24   POCT COVID-19, Antigen   Result Value Ref Range    SARS-COV-2, POC Not-Detected Not Detected    Lot Number [id number]     QC Pass/Fail PASS     Performing Instrument BinaxNOW      2. Viral upper respiratory tract infection  Patient appears well, VSS, afebrile, SPO2 97% on room air. No distress noted. Ears normal.  Throat and pharynx normal.  Neck supple. No adenopathy in the neck. Nose is congested. Sinuses non tender. The chest is clear, without wheezes or rales.    Patient is previously healthy and immunocompetent, presenting with symptoms suspicious for likely viral upper respiratory infection.  Differential includes acute bronchitis, rhinosinusitis, allergic rhinitis, bacterial pneumonia, or COVID.  Do not suspect underlying cardiopulmonary process.  I considered, but think unlikely, dangerous causes of this patient's symptoms to include ACS, CHF or COPD exacerbations, pneumonia, pneumothorax.  Patient is nontoxic appearing and not in need of emergent medical intervention.    The patient is a good candidate for outpatient therapy based on normal PO intake, reassuring exam

## 2024-11-04 NOTE — PATIENT INSTRUCTIONS
If symptoms worsens or fail to improve follow-up with PCP or ER.    Thank you for visiting [facility] today    Nasal Congestion:  Flonase or Nasonex (over the counter) nasal spray, once a day  Saline irrigation kits help wash out sinuses 1-2 times a day  Normal saline nasal spray  Cough:  Throat lozenges, hot tea, and honey may help  Vicks VapoRub at night to help with cough and relieve muscles aches and pain  If not prescribed a cough medication, Robitussin DM is an option.  It is an over the counter cough medication containing dextromethorphan to help suppress cough at night   *Please only take when absolutely needed, as this is a controlled substance that can cause addiction   *Please only take cough syrup at nighttime as it causes drowsiness   *Do not drive or operate any machinery while taking this medication  If you have high blood pressure, take Coricidin HBP (or the generic form) instead.  Follow instructions on the box.  Sore Throat:  Lozenges, as needed. Cepacol lozenges will help numb the throat  Chloraseptic spray also helps to numb throat pain  Salt water gargles to soothe throat pain  Sinus pain/pressure:  Warm, wet towel on face to help with facial sinus pain/pressure  Headache/Pain Fever/Body Aches:  If you can take NSAIDs, take Ibuprofen 400-800mg every 8 hours as needed  If you cannot take NSAIDs, take Tylenol 325-500mg every 6 hours as needed  Miscellanous:  Zyrtec/Xyzal/Allegra/Claritin during the day.  Simple foods like chicken noodle soup, smoothies, hot tea with lemon and honey may also help  Avoid smoking  Minimize exposure to irritants    Please follow up with your primary care provider within 2-5 days if your signs and symptoms have not resolved or worsened.    Please go immediately to the Emergency Department if you develop shortness of breath, chest pain and uncontrollable fever above 100.4F.

## 2024-11-26 ENCOUNTER — HOSPITAL ENCOUNTER (OUTPATIENT)
Facility: HOSPITAL | Age: 39
Discharge: HOME | End: 2024-11-29
Attending: INTERNAL MEDICINE
Payer: MEDICAID

## 2024-11-26 ENCOUNTER — OFFICE VISIT (OUTPATIENT)
Dept: PRIMARY CARE CLINIC | Facility: CLINIC | Age: 39
End: 2024-11-26

## 2024-11-26 VITALS
BODY MASS INDEX: 35.94 KG/M2 | DIASTOLIC BLOOD PRESSURE: 69 MMHG | RESPIRATION RATE: 18 BRPM | WEIGHT: 229 LBS | OXYGEN SATURATION: 100 % | HEART RATE: 69 BPM | TEMPERATURE: 98 F | SYSTOLIC BLOOD PRESSURE: 109 MMHG | HEIGHT: 67 IN

## 2024-11-26 DIAGNOSIS — F31.62 BIPOLAR DISORDER, CURRENT EPISODE MIXED, MODERATE (HCC): ICD-10-CM

## 2024-11-26 DIAGNOSIS — E53.8 B12 DEFICIENCY: ICD-10-CM
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
## 2024-11-26 DIAGNOSIS — Z00.00 PHYSICAL EXAM: ICD-10-CM

## 2024-11-26 DIAGNOSIS — M25.552 LEFT HIP PAIN: ICD-10-CM

## 2024-11-26 DIAGNOSIS — M25.552 LEFT HIP PAIN: Primary | ICD-10-CM

## 2024-11-26 DIAGNOSIS — R25.1 TREMOR OF RIGHT HAND: ICD-10-CM

## 2024-11-26 DIAGNOSIS — E55.9 VITAMIN D DEFICIENCY: ICD-10-CM

## 2024-11-26 LAB
25(OH)D3 SERPL-MCNC: 16.1 NG/ML (ref 30–100)
ALBUMIN SERPL-MCNC: 3.5 G/DL (ref 3.5–5)
ALBUMIN/GLOB SERPL: 1.1 (ref 1.1–2.2)
ALP SERPL-CCNC: 93 U/L (ref 45–117)
ALT SERPL-CCNC: 22 U/L (ref 12–78)
ANION GAP SERPL CALC-SCNC: 3 MMOL/L (ref 2–12)
AST SERPL-CCNC: 20 U/L (ref 15–37)
BILIRUB SERPL-MCNC: 0.4 MG/DL (ref 0.2–1)
BUN SERPL-MCNC: 9 MG/DL (ref 6–20)
BUN/CREAT SERPL: 15 (ref 12–20)
CALCIUM SERPL-MCNC: 9.4 MG/DL (ref 8.5–10.1)
CHLORIDE SERPL-SCNC: 109 MMOL/L (ref 97–108)
CHOLEST SERPL-MCNC: 150 MG/DL
CO2 SERPL-SCNC: 30 MMOL/L (ref 21–32)
CREAT SERPL-MCNC: 0.62 MG/DL (ref 0.55–1.02)
ERYTHROCYTE [DISTWIDTH] IN BLOOD BY AUTOMATED COUNT: 13.9 % (ref 11.5–14.5)
GLOBULIN SER CALC-MCNC: 3.2 G/DL (ref 2–4)
GLUCOSE SERPL-MCNC: 84 MG/DL (ref 65–100)
HCT VFR BLD AUTO: 35.2 % (ref 35–47)
HDLC SERPL-MCNC: 67 MG/DL
HDLC SERPL: 2.2 (ref 0–5)
HGB BLD-MCNC: 11 G/DL (ref 11.5–16)
LDLC SERPL CALC-MCNC: 75.6 MG/DL (ref 0–100)
MCH RBC QN AUTO: 23.9 PG (ref 26–34)
MCHC RBC AUTO-ENTMCNC: 31.3 G/DL (ref 30–36.5)
MCV RBC AUTO: 76.4 FL (ref 80–99)
NRBC # BLD: 0 K/UL (ref 0–0.01)
NRBC BLD-RTO: 0 PER 100 WBC
PLATELET # BLD AUTO: 313 K/UL (ref 150–400)
PMV BLD AUTO: 10.6 FL (ref 8.9–12.9)
POTASSIUM SERPL-SCNC: 4 MMOL/L (ref 3.5–5.1)
PROT SERPL-MCNC: 6.7 G/DL (ref 6.4–8.2)
RBC # BLD AUTO: 4.61 M/UL (ref 3.8–5.2)
SODIUM SERPL-SCNC: 142 MMOL/L (ref 136–145)
TRIGL SERPL-MCNC: 37 MG/DL
TSH SERPL DL<=0.05 MIU/L-ACNC: 1.5 UIU/ML (ref 0.36–3.74)
VIT B12 SERPL-MCNC: 265 PG/ML (ref 193–986)
VLDLC SERPL CALC-MCNC: 7.4 MG/DL
WBC # BLD AUTO: 4.5 K/UL (ref 3.6–11)

## 2024-11-26 PROCEDURE — 73502 X-RAY EXAM HIP UNI 2-3 VIEWS: CPT

## 2024-11-26 ASSESSMENT — PATIENT HEALTH QUESTIONNAIRE - PHQ9
10. IF YOU CHECKED OFF ANY PROBLEMS, HOW DIFFICULT HAVE THESE PROBLEMS MADE IT FOR YOU TO DO YOUR WORK, TAKE CARE OF THINGS AT HOME, OR GET ALONG WITH OTHER PEOPLE: SOMEWHAT DIFFICULT
9. THOUGHTS THAT YOU WOULD BE BETTER OFF DEAD, OR OF HURTING YOURSELF: NOT AT ALL
6. FEELING BAD ABOUT YOURSELF - OR THAT YOU ARE A FAILURE OR HAVE LET YOURSELF OR YOUR FAMILY DOWN: NOT AT ALL
SUM OF ALL RESPONSES TO PHQ QUESTIONS 1-9: 12
8. MOVING OR SPEAKING SO SLOWLY THAT OTHER PEOPLE COULD HAVE NOTICED. OR THE OPPOSITE, BEING SO FIGETY OR RESTLESS THAT YOU HAVE BEEN MOVING AROUND A LOT MORE THAN USUAL: NOT AT ALL
7. TROUBLE CONCENTRATING ON THINGS, SUCH AS READING THE NEWSPAPER OR WATCHING TELEVISION: NEARLY EVERY DAY
SUM OF ALL RESPONSES TO PHQ QUESTIONS 1-9: 12
1. LITTLE INTEREST OR PLEASURE IN DOING THINGS: NEARLY EVERY DAY
4. FEELING TIRED OR HAVING LITTLE ENERGY: NEARLY EVERY DAY
2. FEELING DOWN, DEPRESSED OR HOPELESS: NEARLY EVERY DAY
SUM OF ALL RESPONSES TO PHQ9 QUESTIONS 1 & 2: 6
3. TROUBLE FALLING OR STAYING ASLEEP: NOT AT ALL

## 2024-11-26 ASSESSMENT — ENCOUNTER SYMPTOMS
SHORTNESS OF BREATH: 0
CONSTIPATION: 1
BACK PAIN: 0
SORE THROAT: 0
COUGH: 0
ABDOMINAL PAIN: 1
EYE DISCHARGE: 0
CHEST TIGHTNESS: 0
COLOR CHANGE: 0
DIARRHEA: 0
RHINORRHEA: 0

## 2024-11-26 NOTE — PROGRESS NOTES
\"Have you been to the ER, urgent care clinic since your last visit?  Hospitalized since your last visit?\"    Hospitalized - constipation. Did have a ENDO and Colonoscopy        “Have you seen or consulted any other health care providers outside our system since your last visit?”    [facility]       Chief Complaint   Patient presents with    Follow-up    Referral - General     For GI. Has problems with bowels. Cannot have a BM without taking miralax, she stated.        Pt is ok with scribe.   
11/26/2024) 60 mL 1     No current facility-administered medications on file prior to visit.       Allergies   Allergen Reactions    Sulfa Antibiotics Hives    Ibuprofen Hives    Adhesive Tape Rash       Past Medical History:   Diagnosis Date    Akathisia     Anemia     Asthma     8 yrs old    Attention deficit disorder with hyperactivity(314.01) 10/31/2013    Bipolar I disorder, most recent episode (or current) manic, unspecified 10/31/2013    [facility]    COVID-19 vaccine series completed     Moderna dose #1 received in 2/2021; dose #2 received in 3/2021    COVID-19 virus infection 08/13/2021    History of hypertension     with first pregnacy    Inability to control urination     Morbid obesity     IRISH (obstructive sleep apnea)     Not on CPAP    PCOS (polycystic ovarian syndrome)     A1c 6.6 4/2015    Personality disorder (HCC)      histrionic              Paris Jayne    Posttraumatic stress disorder 10/31/2013    Prediabetes     Prior to gastric bypass    PUD (peptic ulcer disease)     Skin disease     Frequent boils    Somatization disorder 10/31/2013    Unspecified personality disorder 10/31/2013       Past Surgical History:   Procedure Laterality Date    CARPAL TUNNEL RELEASE      3/2022    CHOLECYSTECTOMY      GASTROSTOMY      Placed March 27,2024, replaced April 1st, 2024, pt does not remember when the next one was placed, and taken out may 22nd, 2024    GYN  2013    BTL    GYN      D & C    HERNIA REPAIR      HYSTERECTOMY (CERVIX STATUS UNKNOWN)  2/25/15    Spasic at [facility]     OTHER SURGICAL HISTORY  08/31/2020    Laparoscopic gastric bypass; Dr. Bonilla ([facility]).    TONSILLECTOMY  2007       Family History   Problem Relation Age of Onset    Hypertension Mother     Diabetes Father     Diabetes Mother     Cancer Paternal Grandmother         Pancreatic    Lung Disease Maternal Grandfather     Diabetes Maternal Grandmother     Anesth Problems Neg Hx     No Known Problems Son

## 2024-12-02 DIAGNOSIS — M16.10 HIP ARTHRITIS: Primary | ICD-10-CM

## 2024-12-26 ENCOUNTER — PATIENT MESSAGE (OUTPATIENT)
Dept: PRIMARY CARE CLINIC | Facility: CLINIC | Age: 39
End: 2024-12-26

## 2024-12-26 DIAGNOSIS — J30.89 SEASONAL ALLERGIC RHINITIS DUE TO OTHER ALLERGIC TRIGGER: ICD-10-CM

## 2024-12-26 DIAGNOSIS — R06.2 WHEEZING: Primary | ICD-10-CM

## 2024-12-27 RX ORDER — AZELASTINE 1 MG/ML
1 SPRAY, METERED NASAL 2 TIMES DAILY
Qty: 60 ML | Refills: 1 | Status: SHIPPED | OUTPATIENT
Start: 2024-12-27

## 2024-12-27 RX ORDER — ALBUTEROL SULFATE 90 UG/1
1 INHALANT RESPIRATORY (INHALATION) EVERY 6 HOURS PRN
Qty: 18 G | Refills: 1 | Status: SHIPPED | OUTPATIENT
Start: 2024-12-27

## 2025-03-24 DIAGNOSIS — R06.2 WHEEZING: ICD-10-CM

## 2025-03-24 DIAGNOSIS — J30.89 SEASONAL ALLERGIC RHINITIS DUE TO OTHER ALLERGIC TRIGGER: ICD-10-CM

## 2025-03-25 RX ORDER — AZELASTINE 1 MG/ML
1 SPRAY, METERED NASAL 2 TIMES DAILY
Qty: 60 ML | Refills: 1 | Status: SHIPPED | OUTPATIENT
Start: 2025-03-25

## 2025-03-25 RX ORDER — ALBUTEROL SULFATE 90 UG/1
1 INHALANT RESPIRATORY (INHALATION) EVERY 6 HOURS PRN
Qty: 18 G | Refills: 1 | Status: SHIPPED | OUTPATIENT
Start: 2025-03-25

## (undated) DEVICE — TOWEL SURG W17XL27IN STD BLU COT NONFENESTRATED PREWASHED

## (undated) DEVICE — PACK,LAPAROTOMY,2 REINFORCED GOWNS: Brand: MEDLINE

## (undated) DEVICE — GARMENT,MEDLINE,DVT,INT,CALF,MED, GEN2: Brand: MEDLINE

## (undated) DEVICE — GLOVE SURG SZ 75 L1212IN FNGR THK138MIL BRN LTX FREE

## (undated) DEVICE — PENCIL SMK EVAC 10 FT BLADE ELECTRD ROCKER FOR TELSCP

## (undated) DEVICE — SOLUTION IRRIG 1000ML 0.9% SOD CHL USP POUR PLAS BTL

## (undated) DEVICE — TRAY PREP DRY W/ PREM GLV 2 APPL 6 SPNG 2 UNDPD 1 OVERWRAP

## (undated) DEVICE — POSITIONER HD REST FOAM CMFRT TCH